# Patient Record
Sex: FEMALE | Race: WHITE | NOT HISPANIC OR LATINO | Employment: OTHER | URBAN - METROPOLITAN AREA
[De-identification: names, ages, dates, MRNs, and addresses within clinical notes are randomized per-mention and may not be internally consistent; named-entity substitution may affect disease eponyms.]

---

## 2020-01-27 ENCOUNTER — CONSULT (OUTPATIENT)
Dept: ONCOLOGY | Facility: CLINIC | Age: 80
End: 2020-01-27

## 2020-01-27 ENCOUNTER — TELEPHONE (OUTPATIENT)
Dept: SURGERY | Facility: CLINIC | Age: 80
End: 2020-01-27

## 2020-01-27 ENCOUNTER — LAB (OUTPATIENT)
Dept: LAB | Facility: HOSPITAL | Age: 80
End: 2020-01-27

## 2020-01-27 VITALS
HEART RATE: 91 BPM | SYSTOLIC BLOOD PRESSURE: 145 MMHG | RESPIRATION RATE: 18 BRPM | HEIGHT: 62 IN | TEMPERATURE: 97.8 F | OXYGEN SATURATION: 90 % | DIASTOLIC BLOOD PRESSURE: 79 MMHG | BODY MASS INDEX: 27.51 KG/M2 | WEIGHT: 149.5 LBS

## 2020-01-27 DIAGNOSIS — K62.89 RECTAL MASS: Primary | ICD-10-CM

## 2020-01-27 DIAGNOSIS — C18.9 MALIGNANT NEOPLASM OF COLON, UNSPECIFIED PART OF COLON (HCC): Primary | ICD-10-CM

## 2020-01-27 LAB
ALBUMIN SERPL-MCNC: 4.2 G/DL (ref 3.5–5.2)
ALBUMIN/GLOB SERPL: 1 G/DL (ref 1.1–2.4)
ALP SERPL-CCNC: 111 U/L (ref 38–116)
ALT SERPL W P-5'-P-CCNC: 15 U/L (ref 0–33)
ANION GAP SERPL CALCULATED.3IONS-SCNC: 12.1 MMOL/L (ref 5–15)
AST SERPL-CCNC: 20 U/L (ref 0–32)
BASOPHILS # BLD AUTO: 0.09 10*3/MM3 (ref 0–0.2)
BASOPHILS NFR BLD AUTO: 0.8 % (ref 0–1.5)
BILIRUB SERPL-MCNC: 0.2 MG/DL (ref 0.2–1.2)
BUN BLD-MCNC: 16 MG/DL (ref 6–20)
BUN/CREAT SERPL: 19 (ref 7.3–30)
CALCIUM SPEC-SCNC: 9.3 MG/DL (ref 8.5–10.2)
CEA SERPL-MCNC: 3.07 NG/ML
CHLORIDE SERPL-SCNC: 104 MMOL/L (ref 98–107)
CO2 SERPL-SCNC: 24.9 MMOL/L (ref 22–29)
CREAT BLD-MCNC: 0.84 MG/DL (ref 0.6–1.1)
DEPRECATED RDW RBC AUTO: 45.5 FL (ref 37–54)
EOSINOPHIL # BLD AUTO: 0.37 10*3/MM3 (ref 0–0.4)
EOSINOPHIL NFR BLD AUTO: 3.4 % (ref 0.3–6.2)
ERYTHROCYTE [DISTWIDTH] IN BLOOD BY AUTOMATED COUNT: 12.7 % (ref 12.3–15.4)
FERRITIN SERPL-MCNC: 42.2 NG/ML (ref 13–150)
GFR SERPL CREATININE-BSD FRML MDRD: 66 ML/MIN/1.73
GLOBULIN UR ELPH-MCNC: 4.1 GM/DL (ref 1.8–3.5)
GLUCOSE BLD-MCNC: 108 MG/DL (ref 74–124)
HCT VFR BLD AUTO: 41.3 % (ref 34–46.6)
HGB BLD-MCNC: 13.4 G/DL (ref 12–15.9)
IMM GRANULOCYTES # BLD AUTO: 0.05 10*3/MM3 (ref 0–0.05)
IMM GRANULOCYTES NFR BLD AUTO: 0.5 % (ref 0–0.5)
IRON 24H UR-MRATE: 56 MCG/DL (ref 37–145)
IRON SATN MFR SERPL: 16 % (ref 14–48)
LYMPHOCYTES # BLD AUTO: 4.03 10*3/MM3 (ref 0.7–3.1)
LYMPHOCYTES NFR BLD AUTO: 36.6 % (ref 19.6–45.3)
MCH RBC QN AUTO: 31.6 PG (ref 26.6–33)
MCHC RBC AUTO-ENTMCNC: 32.4 G/DL (ref 31.5–35.7)
MCV RBC AUTO: 97.4 FL (ref 79–97)
MONOCYTES # BLD AUTO: 0.98 10*3/MM3 (ref 0.1–0.9)
MONOCYTES NFR BLD AUTO: 8.9 % (ref 5–12)
NEUTROPHILS # BLD AUTO: 5.5 10*3/MM3 (ref 1.7–7)
NEUTROPHILS NFR BLD AUTO: 49.8 % (ref 42.7–76)
NRBC BLD AUTO-RTO: 0 /100 WBC (ref 0–0.2)
PLATELET # BLD AUTO: 294 10*3/MM3 (ref 140–450)
PMV BLD AUTO: 9.5 FL (ref 6–12)
POTASSIUM BLD-SCNC: 4.2 MMOL/L (ref 3.5–4.7)
PROT SERPL-MCNC: 8.3 G/DL (ref 6.3–8)
RBC # BLD AUTO: 4.24 10*6/MM3 (ref 3.77–5.28)
SODIUM BLD-SCNC: 141 MMOL/L (ref 134–145)
TIBC SERPL-MCNC: 350 MCG/DL (ref 249–505)
TRANSFERRIN SERPL-MCNC: 250 MG/DL (ref 200–360)
WBC NRBC COR # BLD: 11.02 10*3/MM3 (ref 3.4–10.8)

## 2020-01-27 PROCEDURE — 84466 ASSAY OF TRANSFERRIN: CPT | Performed by: INTERNAL MEDICINE

## 2020-01-27 PROCEDURE — 36416 COLLJ CAPILLARY BLOOD SPEC: CPT

## 2020-01-27 PROCEDURE — 83540 ASSAY OF IRON: CPT | Performed by: INTERNAL MEDICINE

## 2020-01-27 PROCEDURE — 80053 COMPREHEN METABOLIC PANEL: CPT | Performed by: INTERNAL MEDICINE

## 2020-01-27 PROCEDURE — 99215 OFFICE O/P EST HI 40 MIN: CPT | Performed by: INTERNAL MEDICINE

## 2020-01-27 PROCEDURE — 82378 CARCINOEMBRYONIC ANTIGEN: CPT | Performed by: INTERNAL MEDICINE

## 2020-01-27 PROCEDURE — 82728 ASSAY OF FERRITIN: CPT | Performed by: INTERNAL MEDICINE

## 2020-01-27 PROCEDURE — 36415 COLL VENOUS BLD VENIPUNCTURE: CPT | Performed by: INTERNAL MEDICINE

## 2020-01-27 PROCEDURE — 85025 COMPLETE CBC W/AUTO DIFF WBC: CPT

## 2020-01-27 RX ORDER — ELECTROLYTES/DEXTROSE
SOLUTION, ORAL ORAL
COMMUNITY

## 2020-01-27 RX ORDER — LISINOPRIL 10 MG/1
TABLET ORAL
COMMUNITY
Start: 2019-11-05 | End: 2020-08-09 | Stop reason: HOSPADM

## 2020-01-27 RX ORDER — VITAMIN E 268 MG
400 CAPSULE ORAL DAILY
COMMUNITY

## 2020-01-27 RX ORDER — PANTOPRAZOLE SODIUM 40 MG/1
TABLET, DELAYED RELEASE ORAL
COMMUNITY
Start: 2019-12-02

## 2020-01-27 RX ORDER — PRAVASTATIN SODIUM 40 MG
40 TABLET ORAL 2 TIMES DAILY
COMMUNITY
Start: 2019-12-09

## 2020-01-27 RX ORDER — ASCORBIC ACID 500 MG
500 TABLET ORAL DAILY
COMMUNITY

## 2020-01-27 NOTE — PROGRESS NOTES
Subjective Frustrated about lack of direction, indicates bright red blood per rectum since early fall    REASON FOR CONSULTATION:    Provide an opinion on any further workup or treatment                             REQUESTING PHYSICIAN: Rosa Christianson MD, Zen Franco MD    RECORDS OBTAINED:  Records of the patients history including those obtained from the referring provider were reviewed and summarized in detail.    HISTORY OF PRESENT ILLNESS:  The patient is a 79 y.o. year old female who is here for an opinion about the above issue.    History of Present Illness       The patient is a 79-year-old female followed (according to record) by primary care with depression, pulmonary hypertension, macular degeneration and retinal degeneration as well as aortic atherosclerosis.  She was seen December 10, 2019 by primary care plans to continue aspirin, lisinopril, pantoprazole and pravastatin.She had been noting bright red blood per rectum since the fall, 2019 with the presumption that she had hemorrhoidal bleeding.  She also indicates that she has been chronically constipated now requiring a laxative on a regular basis.  She has been tested previously with hemoccult but requested that this was not going to be helpful with her symptoms.  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     Review of the procedure note by Dr. Zen Franco January 3, 2020 indicates that there is a large mass at the anorectal area likely in the distal rectum, cauliflower with ulceration multiple biopsies taken. She has not been evaluated by colorectal surgery thus far.  The patient requested assessment and [facility] and is seen with her   and granddaughter.  She has not had weight loss, night sweats, fever, chills though has recognized change in bowel function as described above with bright red blood per rectum for several months.      History reviewed. No pertinent past medical history.     Past Surgical History:   Procedure Laterality Date   • HYSTERECTOMY          Current Outpatient Medications on File Prior to Visit   Medication Sig Dispense Refill   • Cranberry 250 MG tablet Take  by mouth.     • lisinopril (PRINIVIL,ZESTRIL) 10 MG tablet      • Multiple Vitamins-Minerals (MULTIVITAMIN ADULT) tablet Take  by mouth.     • Multiple Vitamins-Minerals (PRESERVISION AREDS 2 PO) Take  by mouth.     • pantoprazole (PROTONIX) 40 MG EC tablet      • pravastatin (PRAVACHOL) 40 MG tablet      • vitamin C (ASCORBIC ACID) 500 MG tablet Take 500 mg by mouth Daily.     • vitamin E 400 UNIT capsule Take 400 Units by mouth Daily.       No current facility-administered medications on file prior to visit.         ALLERGIES:    Allergies   Allergen Reactions   • Codeine Rash        Social History     Socioeconomic History   • Marital status:      Spouse name: Not on file   • Number of children: Not on file   • Years of education: Not on file   • Highest education level: Not on file   Tobacco Use   • Smoking status: Former Smoker   • Smokeless tobacco: Never Used   Substance and Sexual Activity   • Alcohol use: Never     Frequency: Never   • Drug use: Never   • Sexual activity: Defer        Family History   Problem Relation Age of Onset   • Cancer Sister         Lung Cancer   • Hypertension Brother    • Heart disease Brother    • Cancer Sister         Brain Cancer        Review of Systems   Constitutional: Positive for activity change.   HENT: Negative.    Eyes: Negative.    Respiratory: Negative.    Cardiovascular: Negative.    Gastrointestinal: Positive for anal bleeding, blood in stool and constipation.   Endocrine: Negative.    Genitourinary: Negative.   "  Musculoskeletal: Negative.    Skin: Negative.    Neurological: Negative.    Hematological: Negative.    Psychiatric/Behavioral: Negative.         Objective     Vitals:    01/27/20 1432   BP: 145/79   Pulse: 91   Resp: 18   Temp: 97.8 °F (36.6 °C)   TempSrc: Oral   SpO2: 90%   Weight: 67.8 kg (149 lb 8 oz)   Height: 156.2 cm (61.5\")  Comment: new patient height   PainSc: 0-No pain     Current Status 1/27/2020   ECOG score 1       Physical Exam   Constitutional: She is oriented to person, place, and time. She appears well-developed and well-nourished.   HENT:   Head: Normocephalic and atraumatic.   Nose: Nose normal.   Mouth/Throat: Oropharynx is clear and moist.   Eyes: Pupils are equal, round, and reactive to light. Conjunctivae and EOM are normal.   Neck: Normal range of motion. Neck supple.   Cardiovascular: Normal rate, regular rhythm, normal heart sounds and intact distal pulses.   Pulmonary/Chest: Effort normal and breath sounds normal.   Abdominal: Soft. Bowel sounds are normal.   Genitourinary:   Genitourinary Comments: Rectal exam with palpable mass noted anteriorly at approximately 3 cm, nontender fixed   Musculoskeletal: Normal range of motion.   Neurological: She is alert and oriented to person, place, and time.   Skin: Skin is warm and dry.   Psychiatric: She has a normal mood and affect. Her behavior is normal. Judgment and thought content normal.         RECENT LABS:  Hematology WBC   Date Value Ref Range Status   01/27/2020 11.02 (H) 3.40 - 10.80 10*3/mm3 Final     RBC   Date Value Ref Range Status   01/27/2020 4.24 3.77 - 5.28 10*6/mm3 Final     Hemoglobin   Date Value Ref Range Status   01/27/2020 13.4 12.0 - 15.9 g/dL Final     Hematocrit   Date Value Ref Range Status   01/27/2020 41.3 34.0 - 46.6 % Final     Platelets   Date Value Ref Range Status   01/27/2020 294 140 - 450 10*3/mm3 Final          Assessment/Plan       79-year-old female followed by primary care as described above with " development over the last 4 to 5 months of bright red blood per rectum that became associated with worsening constipation.  The patient symptoms, unfortunately, progressed until she had GI assessment that revealed a mass in the distal rectum/anal rectal region that was cauliflower in description with ulceration.  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  She has been advised that she has a rectal malignancy.                                              In discussing this with the patient and her family we discussed that a primary rectal squamous cell carcinomas is a rare disorder and one wonders whether this is not an anal carcinoma that has extended into the rectum.  They are, incidentally, treated like rectal adenocarcinomas with surgical resection but there are studies that use chemoradiotherapy.      This patient needs to be assessed by colorectal surgery and undergo additional staging endoscopically.  We have contacted Dr. Dara Stack's office and, as they review the record, have requested that the patient proceed:    *Return to laboratory today for CMP, ferritin, iron studies, CEA    *Schedule PET/CT this week which would also facilitate radiation therapy consultation/analysis for combined therapy for an apparent anal carcinoma.    *Again the patient will hopefully be seen by colorectal surgery very soon though she will be seen back here in 7 to 10 days to review her her PET/CT results.

## 2020-01-27 NOTE — TELEPHONE ENCOUNTER
Kelsi at [facility] ([phone]) called to schedule a new patient appointment for Benita Garcia  1940. She was worked in her office but Dr. Ezequiel Gray said patient should see Dr. Barry Stack first because of colon cancer but he thinks it could be anal cancer, either way he would like for you to see her first then he can see her after her visit with you. What day would you like for me to schedule her into your schedule. New patient appointment our far out a month. Patient is in Epic. Please advice so I can call Kelsi back at the office.  Thank you,  Alicia.

## 2020-01-30 ENCOUNTER — HOSPITAL ENCOUNTER (OUTPATIENT)
Dept: PET IMAGING | Facility: HOSPITAL | Age: 80
Discharge: HOME | End: 2020-01-30
Admitting: INTERNAL MEDICINE

## 2020-01-30 ENCOUNTER — HOSPITAL ENCOUNTER (OUTPATIENT)
Dept: PET IMAGING | Facility: HOSPITAL | Age: 80
Discharge: HOME | End: 2020-01-30

## 2020-01-30 DIAGNOSIS — K62.89 RECTAL MASS: Primary | ICD-10-CM

## 2020-01-30 LAB — GLUCOSE BLDC GLUCOMTR-MCNC: 96 MG/DL (ref 70–130)

## 2020-01-30 PROCEDURE — 0 FLUDEOXYGLUCOSE F18 SOLUTION: Performed by: INTERNAL MEDICINE

## 2020-01-30 PROCEDURE — 78815 PET IMAGE W/CT SKULL-THIGH: CPT

## 2020-01-30 PROCEDURE — 82962 GLUCOSE BLOOD TEST: CPT

## 2020-01-30 PROCEDURE — A9552 F18 FDG: HCPCS | Performed by: INTERNAL MEDICINE

## 2020-01-30 RX ADMIN — FLUDEOXYGLUCOSE F18 1 DOSE: 300 INJECTION INTRAVENOUS at 07:37

## 2020-01-31 ENCOUNTER — OFFICE VISIT (OUTPATIENT)
Dept: SURGERY | Facility: CLINIC | Age: 80
End: 2020-01-31

## 2020-01-31 VITALS
DIASTOLIC BLOOD PRESSURE: 74 MMHG | BODY MASS INDEX: 28.13 KG/M2 | SYSTOLIC BLOOD PRESSURE: 130 MMHG | HEART RATE: 91 BPM | TEMPERATURE: 97.9 F | HEIGHT: 61 IN | OXYGEN SATURATION: 92 % | RESPIRATION RATE: 16 BRPM | WEIGHT: 149 LBS

## 2020-01-31 DIAGNOSIS — C21.0 ANAL SQUAMOUS CELL CARCINOMA (HCC): Primary | ICD-10-CM

## 2020-01-31 PROCEDURE — 99204 OFFICE O/P NEW MOD 45 MIN: CPT | Performed by: COLON & RECTAL SURGERY

## 2020-01-31 RX ORDER — ASPIRIN 81 MG/1
81 TABLET ORAL DAILY
COMMUNITY

## 2020-01-31 NOTE — PROGRESS NOTES
Benita Garcia is a 79 y.o. female who is seen as a consult at the request of Ezequiel Gray MD for anorectal mass    HPI:    Pt had colonoscopy 1/3/2020 at [facility] with Dr. Franco: several polyps, melanosis coli, and cauliflower-like rectal mass.  Mass with path showing squamous cell carcinoma    She has had multiple colonoscopies in the past.  She has had benign polyps.    She began to have blood per rectum fall 2019.  She has pain when she has a BM.  She feels mass at the external anus    She endorses hx abnl Pap  She is s/p hysterectomy age 35 due to menorrhagia.  Pt does not remember if she had abnormal cells    She had PET scan yesterday    FamHx: lung cancer x2 sisters    Past Medical History:   Diagnosis Date   • Aortic atherosclerosis (CMS/HCC)    • Arthritis    • Blood in stool 12/09/2019    [facility]. Dr. Salvador Zaldivar.   • Boil    • Chronic constipation    • Colon polyps    • Depression    • Dyslipidemia    • Encounter for screening mammogram for breast cancer     09/2014, 10/2014, 11/2015, 11/2016, 11/2017, 11/2018, 11/2019.   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   • Senile purpura (CMS/HCC)    • Transfusion history     Hx of blood transfussion.       Past Surgical History:   Procedure Laterality Date   • COLONOSCOPY N/A 01/03/2020    [facility].   • ENDOSCOPY N/A 01/03/2020    [facility].   • HYSTERECTOMY  1993   • POLYPECTOMY  01/03/2020    [facility].       Social History:   reports that she has quit smoking. Her smoking use included cigarettes. She has a 20.00 pack-year smoking history. She has never used  smokeless tobacco. She reports that she drank alcohol. She reports that she does not use drugs.      Marriage status:     Family History   Problem Relation Age of Onset   • Cancer Sister         Lung Cancer   • Hypertension Brother    • Heart disease Brother    • Cancer Sister         Brain Cancer   • Heart disease Father          Current Outpatient Medications:   •  aspirin 81 MG EC tablet, Take 81 mg by mouth Daily., Disp: , Rfl:   •  Cranberry 250 MG tablet, Take  by mouth., Disp: , Rfl:   •  lisinopril (PRINIVIL,ZESTRIL) 10 MG tablet, , Disp: , Rfl:   •  Multiple Vitamins-Minerals (MULTIVITAMIN ADULT) tablet, Take  by mouth., Disp: , Rfl:   •  Multiple Vitamins-Minerals (PRESERVISION AREDS 2 PO), Take  by mouth., Disp: , Rfl:   •  pantoprazole (PROTONIX) 40 MG EC tablet, , Disp: , Rfl:   •  pravastatin (PRAVACHOL) 40 MG tablet, , Disp: , Rfl:   •  vitamin C (ASCORBIC ACID) 500 MG tablet, Take 500 mg by mouth Daily., Disp: , Rfl:   •  vitamin E 400 UNIT capsule, Take 400 Units by mouth Daily., Disp: , Rfl:     Allergy  Codeine and Lipitor [atorvastatin]    Review of Systems   Constitution: Negative for decreased appetite and weight gain.   HENT: Negative for congestion, hearing loss and hoarse voice.    Eyes: Positive for visual disturbance. Negative for blurred vision and discharge.   Cardiovascular: Negative for chest pain, cyanosis and leg swelling.   Respiratory: Negative for cough, shortness of breath, sleep disturbances due to breathing and snoring.    Endocrine: Negative for cold intolerance and heat intolerance.   Hematologic/Lymphatic: Does not bruise/bleed easily.   Skin: Negative for itching, poor wound healing and skin cancer.   Musculoskeletal: Negative for arthritis, back pain, joint pain and joint swelling.   Gastrointestinal: Positive for constipation. Negative for abdominal pain, change in bowel habit and bowel incontinence.   Genitourinary: Negative for bladder incontinence, dysuria and  hematuria.   Neurological: Negative for brief paralysis, excessive daytime sleepiness, dizziness, focal weakness, headaches, light-headedness and weakness.   Psychiatric/Behavioral: Negative for altered mental status and hallucinations. The patient does not have insomnia.    Allergic/Immunologic: Negative for HIV exposure and persistent infections.   All other systems reviewed and are negative.      Vitals:    01/31/20 0812   BP: 130/74   Pulse: 91   Resp: 16   Temp: 97.9 °F (36.6 °C)   SpO2: 92%     Body mass index is 28.15 kg/m².    Physical Exam   Constitutional: She is oriented to person, place, and time. She appears well-developed and well-nourished. No distress.   HENT:   Head: Normocephalic and atraumatic.   Nose: Nose normal.   Mouth/Throat: Oropharynx is clear and moist.   Eyes: Pupils are equal, round, and reactive to light. Conjunctivae and EOM are normal.   Neck: Normal range of motion. No tracheal deviation present.   Pulmonary/Chest: Effort normal and breath sounds normal. No respiratory distress.   Abdominal: Soft. Bowel sounds are normal. She exhibits no distension.   Genitourinary:   Genitourinary Comments: Perianal exam: external: right posterior anal mass  TEGAN- decreased tone, +firm, irregular mass right posterior anal canal for 4 cm   Musculoskeletal: Normal range of motion. She exhibits no edema or deformity.   Neurological: She is alert and oriented to person, place, and time. No cranial nerve deficit. Coordination and gait normal.   Skin: Skin is warm and dry.   Psychiatric: She has a normal mood and affect. Her behavior is normal. Judgment normal.       Review of Medical Record:  I reviewed records colonoscopy images, report, and pathology: several polyps, melanosis coli, and cauliflower-like rectal mass.  Mass with pathology showing squamous cell carcinoma    I personally reviewed images PET scan yesterday: no evidence distant mets    Assessment:  1. Anal squamous cell carcinoma (CMS/HCC)         Plan:    Discussion with pt and her  regarding anal squamous cell carcinoma.  Will refer for chemoradiation.  Will also refer to GYN for evaluation, as anal cancer is associated with an increased risk of HPV related cancer.    Regarding PET, will await formal radiology read.      RTC 3 months      Scribed for Barry Stack MD by Mayte Downey PA-C  1/31/2020   This patient was evaluated by me, recommendations made, documentation reviewed, edited, and revised by me, Barry Stack MD

## 2020-02-04 ENCOUNTER — LAB (OUTPATIENT)
Dept: OTHER | Facility: HOSPITAL | Age: 80
End: 2020-02-04

## 2020-02-04 ENCOUNTER — OFFICE VISIT (OUTPATIENT)
Dept: ONCOLOGY | Facility: CLINIC | Age: 80
End: 2020-02-04

## 2020-02-04 ENCOUNTER — DOCUMENTATION (OUTPATIENT)
Dept: ONCOLOGY | Facility: CLINIC | Age: 80
End: 2020-02-04

## 2020-02-04 VITALS
HEART RATE: 75 BPM | HEIGHT: 61 IN | BODY MASS INDEX: 28.15 KG/M2 | OXYGEN SATURATION: 90 % | WEIGHT: 149.1 LBS | TEMPERATURE: 97.3 F | SYSTOLIC BLOOD PRESSURE: 128 MMHG | RESPIRATION RATE: 18 BRPM | DIASTOLIC BLOOD PRESSURE: 77 MMHG

## 2020-02-04 DIAGNOSIS — K62.89 RECTAL MASS: ICD-10-CM

## 2020-02-04 DIAGNOSIS — C21.0 ANAL CANCER (HCC): Primary | ICD-10-CM

## 2020-02-04 LAB
BASOPHILS # BLD AUTO: 0.12 10*3/MM3 (ref 0–0.2)
BASOPHILS NFR BLD AUTO: 0.9 % (ref 0–1.5)
DEPRECATED RDW RBC AUTO: 44 FL (ref 37–54)
EOSINOPHIL # BLD AUTO: 0.33 10*3/MM3 (ref 0–0.4)
EOSINOPHIL NFR BLD AUTO: 2.5 % (ref 0.3–6.2)
ERYTHROCYTE [DISTWIDTH] IN BLOOD BY AUTOMATED COUNT: 12.7 % (ref 12.3–15.4)
HCT VFR BLD AUTO: 41.2 % (ref 34–46.6)
HGB BLD-MCNC: 13.6 G/DL (ref 12–15.9)
IMM GRANULOCYTES # BLD AUTO: 0.14 10*3/MM3 (ref 0–0.05)
IMM GRANULOCYTES NFR BLD AUTO: 1.1 % (ref 0–0.5)
LYMPHOCYTES # BLD AUTO: 3.99 10*3/MM3 (ref 0.7–3.1)
LYMPHOCYTES NFR BLD AUTO: 30.5 % (ref 19.6–45.3)
MCH RBC QN AUTO: 31.2 PG (ref 26.6–33)
MCHC RBC AUTO-ENTMCNC: 33 G/DL (ref 31.5–35.7)
MCV RBC AUTO: 94.5 FL (ref 79–97)
MONOCYTES # BLD AUTO: 1.13 10*3/MM3 (ref 0.1–0.9)
MONOCYTES NFR BLD AUTO: 8.6 % (ref 5–12)
NEUTROPHILS # BLD AUTO: 7.37 10*3/MM3 (ref 1.7–7)
NEUTROPHILS NFR BLD AUTO: 56.4 % (ref 42.7–76)
NRBC BLD AUTO-RTO: 0 /100 WBC (ref 0–0.2)
PLATELET # BLD AUTO: 390 10*3/MM3 (ref 140–450)
PMV BLD AUTO: 9 FL (ref 6–12)
RBC # BLD AUTO: 4.36 10*6/MM3 (ref 3.77–5.28)
WBC NRBC COR # BLD: 13.02 10*3/MM3 (ref 3.4–10.8)

## 2020-02-04 PROCEDURE — 36415 COLL VENOUS BLD VENIPUNCTURE: CPT

## 2020-02-04 PROCEDURE — 99215 OFFICE O/P EST HI 40 MIN: CPT | Performed by: INTERNAL MEDICINE

## 2020-02-04 PROCEDURE — 85025 COMPLETE CBC W/AUTO DIFF WBC: CPT | Performed by: INTERNAL MEDICINE

## 2020-02-04 NOTE — PROGRESS NOTES
Call rec from Dr Gray-he would like to start pt on Xeloda with Mitomycin.    Xeloda will be 1500 mg BID with radiation (M-F) for 6 weeks.    Mitomycin will be 15 mg one time.    I have notified precert team.     I have submitted the B vs D determination to [facility] Medicare.    Waiting for a decision.

## 2020-02-05 ENCOUNTER — MEDICATION THERAPY MANAGEMENT (OUTPATIENT)
Dept: PHARMACY | Facility: HOSPITAL | Age: 80
End: 2020-02-05

## 2020-02-05 ENCOUNTER — APPOINTMENT (OUTPATIENT)
Dept: LAB | Facility: HOSPITAL | Age: 80
End: 2020-02-05

## 2020-02-05 ENCOUNTER — SPECIALTY PHARMACY (OUTPATIENT)
Dept: ONCOLOGY | Facility: CLINIC | Age: 80
End: 2020-02-05

## 2020-02-05 ENCOUNTER — DOCUMENTATION (OUTPATIENT)
Dept: ONCOLOGY | Facility: CLINIC | Age: 80
End: 2020-02-05

## 2020-02-05 VITALS — BODY MASS INDEX: 28 KG/M2 | WEIGHT: 148.1 LBS

## 2020-02-05 DIAGNOSIS — I87.8 POOR VENOUS ACCESS: Primary | ICD-10-CM

## 2020-02-05 RX ORDER — CAPECITABINE 500 MG/1
TABLET, FILM COATED ORAL
Qty: 120 TABLET | Refills: 0 | Status: SHIPPED | OUTPATIENT
Start: 2020-02-05 | End: 2020-03-02

## 2020-02-05 NOTE — PROGRESS NOTES
Oral Chemotherapy Teaching      Patient Name/:  Benita Garcia   1940  Oral Chemotherapy Regimen:  Xeloda 1500 mg po bid Monday-Friday with radiation, Mitomycin 10  Mg/m2 IV day 1   Date Started Medication: when radiation begins    Initial Teaching Follow Up Comments     Safety     Storage instructions (away from children; away from heat/cold, sunlight, or moisture), handling - use of gloves (caregivers), washing hands after touching pills, managing waste     “How are you storing your medications?”, reminders on storage, proper handling (caregivers using gloves, washing hands, away from children, managing waste, etc.), disposal of medication with D/C or dosage change    Storage of Xeloda at room temp in a dry location secured from children and pets.     Adherence      patient and/or caregiver on how to take medication, take with/without food, assess their adherence potential, stress importance of adherence, ways to manage adherence (pill boxes, phone reminders, calendars), what to do if miss a dose   “How are you taking your medication?” “How are you remembering to take your medication?”, “How many doses have you missed?”, determine reasons for non-adherence (not remembering, side effects, etc), ways to improve, overadherence? Remind patient of ways to improve/maintain adherence   Xeloda 1500 mg twice daily 30 minutes after a meal at approximately the same time daily.  An example calendar was given to the patient with emphasis that her own schedule had not been set yet.      Side Effects/Adverse Reactions      patient on potential side effects, s/s, ways to manage, when to call MD/seek help     Determine if patient experiencing side effects, ways to manage  The following potential side effects and management were discussed:  Low WBCs along with handwashing, avoidance of flu, and calling for temp >100.4, Low hemoglobin and symptoms of anemia and fatigue (rest periods encouraged), diarrhea  along with imodium management, mouth sores along with baking soda and salt rinses pc and hs as management, see below     Miscellaneous     Food interactions, DDIs, financial issues Determine if patient started any new medications since being placed on oral chemo (analyze for DDI) DDI report reviewed.  Pt was instructed to take her PPI at bedtime spaced away from Xeloda     Additional Notes:  Hand foot syndrome along with importance of moisturizing with Eucerin or Aquaphor, elevated LFTS, nausea/vomiting along with zofran management, hair loss, low platelets along with bleeding precautions of using soft toothbrush, gentle flossing, reporting nosebleeds or excessive bruising, and fluid rentention.  Overlapping side effects with Mitomycin were reviewed.  Pt was informed of Mitomycin blue color and to expect a slow IV push of Mitomycin on day 1 of treatment.  The concept of julieth and recovery was reviewed.  We also discussed importance of setting up schedule for PICC line placement and to keep her radiology appointment for scheduling of xrt.  She was instructed to notify the office if xeloda had not been delivered prior to first xrt day.    CCA and consents signed.

## 2020-02-05 NOTE — PROGRESS NOTES
Robin has denied the Capecitabine through pts Part D plan but has approved it through her Part B plan.     I have escribed the Rx to [facility] for processing.

## 2020-02-06 NOTE — PROGRESS NOTES
Subjective Discussed overall plan, use of PICC line    REASON FOR CONSULTATION: Anal carcinoma     REQUESTING PHYSICIAN: Rosa Christianson MD, Zen Franco MD    History of Present Illness       The patient is a 79-year-old female followed (according to record) by primary care with depression, pulmonary hypertension, macular degeneration and retinal degeneration as well as aortic atherosclerosis.  She was seen December 10, 2019 by primary care plans to continue aspirin, lisinopril, pantoprazole and pravastatin.She had been noting bright red blood per rectum since the fall, 2019 with the presumption that she had hemorrhoidal bleeding.  She also indicates that she has been chronically constipated now requiring a laxative on a regular basis.  She has been tested previously with hemoccult but requested that this was not going to be helpful with her symptoms.  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     Review of the procedure note by Dr. Zen Franco January 3, 2020 indicates that there is a large mass at the anorectal area likely in the distal rectum, cauliflower with ulceration multiple biopsies taken. She has not been evaluated by colorectal surgery thus far.  The patient requested assessment and [facility] and is seen with her  and granddaughter.  She has not had weight loss, night sweats, fever, chills though has recognized change in bowel function as described above with bright red blood per rectum for several months.     The patient was assessed January 27 and felt to have an apparent anal carcinoma that extended into the rectum.  As result she was asked to undergo PET/CT scanning and assessment by  colorectal surgery.  PET/CT demonstrated the primary tumor as an oblong tumor involving the 4 inferior SPECT SPECT the rectum measuring 5 cm x 3.6 cm with intense hypermetabolism at 33.1.  There is no evidence of disease within the abdomen pelvis with mild hypermetabolism with borderline enlarged bilateral hilar and subcarinal lymph nodes with SUV up to 6.0.  These are suspected to be inflammatory in nature.  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  The patient is now scheduled to be seen by radiation therapy on February 7, 2001.     The patient is seen back February 4, 2020 and we have again discussed concurrent chemoradiotherapy rather than surgery using concurrent 5-FU (in this case capecitabine)and mitomycin.  This is a combination of 10 mg/m²-maximum 15 mg IV of mitomycin given through a free-flowing line, capecitabine is 825 mg meter squared on radiation days through the completion of radiation therapy.  This is been discussed in detail with she and her  of approximately 45 minutes therapy for 2020.  We discussed assessing her for Xeloda toxicity, have her undergo teaching for the combination treatment, subsequently scheduling a PICC line, and to proceed to radiation therapy consultation as scheduled on February 7.  We are hopeful that we can start within the next week.   The patient went on to undergo teaching, had a PICC line placed February 11, 2020 and has been seen by radiation therapy with treatment to begin February 17, 2020.  She is seen back with her  and we discussed the overall plan in detail.  Past Medical History:   Diagnosis Date   • Anal cancer (CMS/HCC) 01/2020    SQUAMOUS CELL CARCINOMA, FOLLOWED BY DR. JOVITA BAUTISTA   • Aortic atherosclerosis (CMS/HCC)    • Arthritis    • Boil    • Chronic constipation    •  Colon polyps    • Depression    • Encounter for screening mammogram for breast cancer     09/2014, 10/2014, 11/2015, 11/2016, 11/2017, 11/2018, 11/2019.   • Exudative senile macular retinal degeneration (CMS/HCC)    • GERD (gastroesophageal reflux disease)     FOLLOWED BY DR. 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        Past Surgical History:   Procedure Laterality Date   • COLONOSCOPY W/ POLYPECTOMY N/A 01/03/2020    LARGE MASS IN ANORECTAL AREA, BX: SQUAMOUS CELL CARCINOMA, 3 TUBULAR ADENOMA POLYPS IN ASCENDING, 3 TUBULAR ADENOMA POLYPS IN TRANSVERSE, MELANOSIS COLI, DR. IMTIAZ SELLERS AT [facility]    • ENDOSCOPY N/A 01/03/2020    DR. IMTIAZ SELLERS AT [facility]   • HYSTERECTOMY N/A 1993        Current Outpatient Medications on File Prior to Visit   Medication Sig Dispense Refill   • aspirin 81 MG EC tablet Take 81 mg by mouth Daily.     • capecitabine (XELODA) 500 MG chemo tablet Take 3 tabs (1500 mg) by mouth twice per day Mon-Fri with radiation. 120 tablet 0   • Cranberry 250 MG tablet Take  by mouth.     • lisinopril (PRINIVIL,ZESTRIL) 10 MG tablet      • Multiple Vitamins-Minerals (MULTIVITAMIN ADULT) tablet Take  by mouth.     • Multiple Vitamins-Minerals (PRESERVISION AREDS 2 PO) Take  by mouth.     • pantoprazole (PROTONIX) 40 MG EC tablet      • pravastatin (PRAVACHOL) 40 MG tablet      • vitamin C (ASCORBIC ACID) 500 MG tablet Take 500 mg by mouth Daily.     • vitamin E 400 UNIT capsule Take 400 Units by mouth Daily.       No current facility-administered medications on file prior to visit.         ALLERGIES:    Allergies   Allergen Reactions   • Codeine Rash   • Lipitor [Atorvastatin] Rash        Social History  "    Socioeconomic History   • Marital status:      Spouse name: Dimitri   • Number of children: 2   • Years of education: High school   • Highest education level: Not on file   Occupational History     Employer: RETIRED   Tobacco Use   • Smoking status: Former Smoker     Packs/day: 0.50     Years: 40.00     Pack years: 20.00     Types: Cigarettes   • Smokeless tobacco: Never Used   Substance and Sexual Activity   • Alcohol use: Not Currently     Frequency: Never   • Drug use: Never   • Sexual activity: Yes     Partners: Male     Birth control/protection: Post-menopausal, Surgical     Comment: .        Family History   Problem Relation Age of Onset   • Cancer Sister         Lung Cancer   • Lung cancer Sister    • Hypertension Brother    • Heart disease Brother    • Cancer Sister         Brain Cancer   • Brain cancer Sister    • Heart disease Father    • Cancer Sister    • Lung cancer Sister    • Cancer Sister    • Lung cancer Sister         Review of Systems   Constitutional: Positive for activity change.   HENT: Negative.    Eyes: Negative.    Respiratory: Negative.    Cardiovascular: Negative.    Gastrointestinal: Positive for anal bleeding, blood in stool and constipation.   Endocrine: Negative.    Genitourinary: Negative.    Musculoskeletal: Negative.    Skin: Negative.    Neurological: Negative.    Hematological: Negative.    Psychiatric/Behavioral: Negative.         Objective     Vitals:    02/12/20 1233   BP: 123/73   Pulse: 95   Resp: 18   Temp: 97.9 °F (36.6 °C)   TempSrc: Oral   SpO2: 92%   Weight: 67.6 kg (149 lb)   Height: 154.9 cm (60.98\")   PainSc: 0-No pain     Current Status 2/12/2020   ECOG score 0       Physical Exam   Constitutional: She is oriented to person, place, and time. She appears well-developed and well-nourished.   HENT:   Head: Normocephalic and atraumatic.   Nose: Nose normal.   Mouth/Throat: Oropharynx is clear and moist.   Eyes: Pupils are equal, round, and reactive to " light. Conjunctivae and EOM are normal.   Neck: Normal range of motion. Neck supple.   Cardiovascular: Normal rate, regular rhythm, normal heart sounds and intact distal pulses.   Pulmonary/Chest: Effort normal and breath sounds normal.   Abdominal: Soft. Bowel sounds are normal.   Genitourinary:   Genitourinary Comments: Rectal exam with palpable mass noted anteriorly at approximately 3 cm, nontender fixed   Musculoskeletal: Normal range of motion.   PICC line placed right upper extremity without additional inflammation or discharge at placement site   Neurological: She is alert and oriented to person, place, and time.   Skin: Skin is warm and dry.   Psychiatric: She has a normal mood and affect. Her behavior is normal. Judgment and thought content normal.         RECENT LABS:  Hematology WBC   Date Value Ref Range Status   02/12/2020 11.26 (H) 3.40 - 10.80 10*3/mm3 Final     RBC   Date Value Ref Range Status   02/12/2020 3.98 3.77 - 5.28 10*6/mm3 Final     Hemoglobin   Date Value Ref Range Status   02/12/2020 12.6 12.0 - 15.9 g/dL Final     Hematocrit   Date Value Ref Range Status   02/12/2020 39.1 34.0 - 46.6 % Final     Platelets   Date Value Ref Range Status   02/12/2020 361 140 - 450 10*3/mm3 Final        FDG PET/CT IMAGING SKULL BASE TO MID THIGH  1/30/2020    FINDINGS: The primary neoplasm is an oblong tumor mass involving the far  inferior aspect of the rectum and the pain is that measures  approximately 5 cm in greatest cephalocaudad dimension and 3.6 cm in  greatest mediolateral dimension. It demonstrates intense hypermetabolism  with a maximum measured as she 33.1 g/mL. There is no metabolic evidence  of metastatic disease within the abdomen or pelvis. Particular, no  hypermetabolic lymphadenopathy is identified. There is physiologic  distribution of the radiopharmaceutical within the neck. Imaging of the  chest shows mild hypermetabolism within a borderline enlarged bilateral  hilar and subcarinal lymph  nodes. These have maximum SUV in the range of  6.0 g/mL. This would be a very usual location for isolated metastatic  disease from an anorectal neoplasm. These lymph nodes are favored to be  inflammatory in etiology.     IMPRESSION:  Intensely hypermetabolic neoplasm involving the far inferior  aspect of the rectum and essentially the entire anus. There is no  compelling metabolic evidence of metastatic disease within the neck,  chest, abdomen or pelvis.     This report was finalized on 1/31/2020 2:39 PM by Dr. Mayco Patel M.D.    Assessment/Plan       79-year-old female followed by primary care as described above with development over the last 4 to 5 months of bright red blood per rectum that became associated with worsening constipation.  The patient symptoms, unfortunately, progressed until she had GI assessment that revealed a mass in the distal rectum/anal rectal region that was cauliflower in description with ulceration.  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  She has been advised that she has a rectal malignancy.                                              In discussing this with the patient and her family we discussed that a primary rectal squamous cell carcinomas is a rare disorder and one wonders whether this is not an anal carcinoma that has extended into the rectum.  They are, incidentally, treated like rectal adenocarcinomas with surgical resection but there are studies that use chemoradiotherapy.      This patient needs to be assessed by colorectal surgery and undergo additional staging endoscopically.  We have contacted Dr. Dara Stack's office and, as they review the record, have requested that the patient proceed with laboratory studies, PET/CT and colorectal surgery assessment.  Additional laboratory studies include a CEA of 3.07, no evidence of iron deficiency, CMP with total protein mildly elevated at 8.3 g/dL.  This did occur and the the patient's  studies suggest T2 N0 M0-stage IIA disease.      After review by colorectal surgery the patient is felt best treated by chemoradiation therapy and she is seen with her  February 4, 2020 at which time we discussed how this can be accomplished.  The patient proceeded to laboratory for DPD testing which is currently pending as she is seen February 12, 2020.  We have obtained Xeloda at a 25 mg meter squared to 1500 mg p.o. twice daily during days of radiation therapy with mitomycin also planned - 10 mg meter squared to 15 mg dosing anticipated day 1.  She has been seen by radiation therapy and plans are to initiate treatment February 17, 2020.    Plan:  *Patient to present February 17 for IV mitomycin x1, 15 mg  *Radiation therapy also scheduled to begin February 17  *Twice daily dosing Xeloda 1500 mg p.o. twice daily start February 17, 2020  *Return 1 week later for NP assessment, dressing of PICC line, review of side effect profile and MD follow-up approximately 2 weeks later

## 2020-02-07 ENCOUNTER — CONSULT (OUTPATIENT)
Dept: RADIATION ONCOLOGY | Facility: HOSPITAL | Age: 80
End: 2020-02-07

## 2020-02-07 ENCOUNTER — APPOINTMENT (OUTPATIENT)
Dept: RADIATION ONCOLOGY | Facility: HOSPITAL | Age: 80
End: 2020-02-07

## 2020-02-07 VITALS
BODY MASS INDEX: 27.79 KG/M2 | SYSTOLIC BLOOD PRESSURE: 133 MMHG | HEART RATE: 84 BPM | WEIGHT: 147 LBS | OXYGEN SATURATION: 91 % | DIASTOLIC BLOOD PRESSURE: 79 MMHG

## 2020-02-07 DIAGNOSIS — C21.0 ANAL CANCER (HCC): Primary | ICD-10-CM

## 2020-02-07 PROCEDURE — 77263 THER RADIOLOGY TX PLNG CPLX: CPT | Performed by: RADIOLOGY

## 2020-02-07 PROCEDURE — G0463 HOSPITAL OUTPT CLINIC VISIT: HCPCS | Performed by: RADIOLOGY

## 2020-02-07 PROCEDURE — 99205 OFFICE O/P NEW HI 60 MIN: CPT | Performed by: RADIOLOGY

## 2020-02-07 PROCEDURE — 77334 RADIATION TREATMENT AID(S): CPT | Performed by: RADIOLOGY

## 2020-02-07 NOTE — H&P (VIEW-ONLY)
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 Stack:    I had the pleasure of seeing Benita Garcia  [facility].    The patient is a 79 y.o. female with newly diagnosed anal cancer.  She presented in late December 2019 with rectal bleeding and progressive constipation for several months. She underwent a colonscopy with Dr. Zen Franco on 1/3/20 which showed a large mass at the anorectal area and distal rectum, with some ulceration.  Biopsies of the mass revealed moderately differentiated squamous cell carcinoma.      She had a PET scan on 1/30/20 which revealed a 5 x 3.3cm mass in the distal rectum with intense hypermetabolic activity with max suv of 33 with no hypermetabolic pelvic or inguinal adenopathy.  She did have mildly enlarged hilar and subcarinal nodes with mild uptake max suv of 6 favored to be inflammatory and no evidence of distant metastatic disease. She met with Dr. Barry Stack on 1/31/20 and on her physical exam was noted to have a firm 4cm mass right posterior anal canal with decreased rectal tone    She denies any recent weight loss, fever or chills.  She still has rectal bleeding and constipation but no signficant pain. She met with Dr. Gray and he has recommended concurrent chemo and radiation consisting of xeloda 825mg/m2 daily with radiation and mitomycin C.  She is referred today for evaluation for radiation.   .        Review of Systems   Constitutional: Positive for activity change, appetite change and fatigue. Negative for fever and unexpected weight change.   HENT: Negative.    Eyes: Positive for visual disturbance.   Respiratory: Negative.    Cardiovascular: Negative.    Gastrointestinal: Positive for blood in stool and constipation.   Genitourinary: Negative.     Musculoskeletal: Negative.    Skin: Negative.    Neurological: Negative.    Psychiatric/Behavioral: Negative.          Past Medical History:   Diagnosis Date   • Anal cancer (CMS/HCC) 01/2020    SQUAMOUS CELL CARCINOMA, FOLLOWED BY DR. JOVITA BAUTISTA   • Aortic atherosclerosis (CMS/HCC)    • Arthritis    • Boil    • Chronic constipation    • Colon polyps    • Depression    • Encounter for screening mammogram for breast cancer     09/2014, 10/2014, 11/2015, 11/2016, 11/2017, 11/2018, 11/2019.   • Exudative senile macular retinal degeneration (CMS/HCC)    • GERD (gastroesophageal reflux disease)     FOLLOWED BY DR. 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         Past Surgical History:   Procedure Laterality Date   • COLONOSCOPY W/ POLYPECTOMY N/A 01/03/2020    LARGE MASS IN ANORECTAL AREA, BX: SQUAMOUS CELL CARCINOMA, 3 TUBULAR ADENOMA POLYPS IN ASCENDING, 3 TUBULAR ADENOMA POLYPS IN TRANSVERSE, MELANOSIS COLI, DR. IMTIAZ SELLERS AT [facility]    • ENDOSCOPY N/A 01/03/2020    DR. IMTIAZ SELLERS AT [facility]   • HYSTERECTOMY N/A 1993         Social History     Socioeconomic History   • Marital status:      Spouse name: Dimitri   • Number of children: 2   • Years of education: High school   • Highest education level: Not on file   Occupational History     Employer: RETIRED   Tobacco Use   • Smoking status: Former Smoker     Packs/day: 0.50     Years: 40.00     Pack years: 20.00     Types: Cigarettes   • Smokeless tobacco: Never Used   Substance and Sexual Activity   • Alcohol use: Not Currently     Frequency: Never   • Drug use: Never   • Sexual activity: Yes     Partners: Male     Birth control/protection: Post-menopausal,  Surgical     Comment: .         Family History   Problem Relation Age of Onset   • Cancer Sister         Lung Cancer   • Lung cancer Sister    • Hypertension Brother    • Heart disease Brother    • Cancer Sister         Brain Cancer   • Brain cancer Sister    • Heart disease Father    • Cancer Sister    • Lung cancer Sister    • Cancer Sister    • Lung cancer Sister           Objective    Physical Exam   Constitutional: She is oriented to person, place, and time. She appears well-developed and well-nourished.   HENT:   Head: Atraumatic.   Eyes: EOM are normal.   Neck: Neck supple.   Pulmonary/Chest: Effort normal.   Abdominal: Soft. She exhibits no mass. There is no tenderness.   Genitourinary:   Genitourinary Comments: Mass extending from anus to perianal skin, firm palpable mass extending from anal verge superiorly 3-4cm posterior and right anal wall.  Mild bleeding, no palpable inguinal nodes   Musculoskeletal: Normal range of motion.   Neurological: She is alert and oriented to person, place, and time.   Skin: Skin is warm.   Psychiatric: She has a normal mood and affect. Her behavior is normal. Judgment and thought content normal.         Current Outpatient Medications on File Prior to Visit   Medication Sig Dispense Refill   • aspirin 81 MG EC tablet Take 81 mg by mouth Daily.     • Cranberry 250 MG tablet Take  by mouth.     • lisinopril (PRINIVIL,ZESTRIL) 10 MG tablet      • Multiple Vitamins-Minerals (MULTIVITAMIN ADULT) tablet Take  by mouth.     • pantoprazole (PROTONIX) 40 MG EC tablet      • pravastatin (PRAVACHOL) 40 MG tablet      • vitamin C (ASCORBIC ACID) 500 MG tablet Take 500 mg by mouth Daily.     • vitamin E 400 UNIT capsule Take 400 Units by mouth Daily.     • capecitabine (XELODA) 500 MG chemo tablet Take 3 tabs (1500 mg) by mouth twice per day Mon-Fri with radiation. 120 tablet 0   • Multiple Vitamins-Minerals (PRESERVISION AREDS 2 PO) Take  by mouth.       No current  facility-administered medications on file prior to visit.        ALLERGIES:    Allergies   Allergen Reactions   • Codeine Rash   • Lipitor [Atorvastatin] Rash       There were no vitals taken for this visit.     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  I have personally reviewed her imaging including PET scan and records from referring physicians.  I discussed with Mrs. Garcia and her  our recommendation for concurrent chemo and radiation consisting of xeloda and mitomycin C.  I  discussed in detail the risks, benefits and rationale of radiation therapy for anal cancer to include but not limited to the following:    Acute:  skin erythema, breakdown, fatigue, lowered blood counts, risk of infection, nausea, vomiting, abdominal cramping, diarrhea, bowel or bladder incontinence, urinary urgency, hesitancy, frequency or dysuria, pain with bowel movements    Late:  persistant fatigue, lowered blood counts, bowel ulceration, small bowel obstruction, anal stricture, persistant bowel incontinence or frequency, hip fracture, shrinkage of the bladder resulting in increased urinary frequency, vaginal fibrosis and foreshortening and the remote risk of second malignancies    Ms. Garcia voiced understanding and an informed consent was signed and placed on the chart today.  We will proceed with CT simualtion for treatment planning purposes today and begin her treatments concurrently with the chemo on Monday February 17.  I plan to deliver a dose of 50-54Gy to the primary tumor. I have also scheduled her to be evaluated by a gynecologist, Dr. Cassie Nick, with [facility] and the first available appointment was February 19.      I spent greater than 60 minutes in face-to-face time with the patient and 45 minutes of that time were spent in counseling and coordination of care, including review of imaging and pathology;  indications, goals, logistics, alternatives and risks - both common and rare - for my recommendations as well as surveillance and potential outcomes.           Thank you very much for allowing me to participate in the care of this very pleasant patient.    Sincerely,      Mariana Beyer MD

## 2020-02-07 NOTE — PROGRESS NOTES
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 Stack:    I had the pleasure of seeing Benita Garcia  [facility].    The patient is a 79 y.o. female with newly diagnosed anal cancer.  She presented in late December 2019 with rectal bleeding and progressive constipation for several months. She underwent a colonscopy with Dr. Zen Franco on 1/3/20 which showed a large mass at the anorectal area and distal rectum, with some ulceration.  Biopsies of the mass revealed moderately differentiated squamous cell carcinoma.      She had a PET scan on 1/30/20 which revealed a 5 x 3.3cm mass in the distal rectum with intense hypermetabolic activity with max suv of 33 with no hypermetabolic pelvic or inguinal adenopathy.  She did have mildly enlarged hilar and subcarinal nodes with mild uptake max suv of 6 favored to be inflammatory and no evidence of distant metastatic disease. She met with Dr. Barry Stack on 1/31/20 and on her physical exam was noted to have a firm 4cm mass right posterior anal canal with decreased rectal tone    She denies any recent weight loss, fever or chills.  She still has rectal bleeding and constipation but no signficant pain. She met with Dr. Gray and he has recommended concurrent chemo and radiation consisting of xeloda 825mg/m2 daily with radiation and mitomycin C.  She is referred today for evaluation for radiation.   .        Review of Systems   Constitutional: Positive for activity change, appetite change and fatigue. Negative for fever and unexpected weight change.   HENT: Negative.    Eyes: Positive for visual disturbance.   Respiratory: Negative.    Cardiovascular: Negative.    Gastrointestinal: Positive for blood in stool and constipation.   Genitourinary: Negative.     Musculoskeletal: Negative.    Skin: Negative.    Neurological: Negative.    Psychiatric/Behavioral: Negative.          Past Medical History:   Diagnosis Date   • Anal cancer (CMS/HCC) 01/2020    SQUAMOUS CELL CARCINOMA, FOLLOWED BY DR. JOVITA BAUTISTA   • Aortic atherosclerosis (CMS/HCC)    • Arthritis    • Boil    • Chronic constipation    • Colon polyps    • Depression    • Encounter for screening mammogram for breast cancer     09/2014, 10/2014, 11/2015, 11/2016, 11/2017, 11/2018, 11/2019.   • Exudative senile macular retinal degeneration (CMS/HCC)    • GERD (gastroesophageal reflux disease)     FOLLOWED BY DR. 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         Past Surgical History:   Procedure Laterality Date   • COLONOSCOPY W/ POLYPECTOMY N/A 01/03/2020    LARGE MASS IN ANORECTAL AREA, BX: SQUAMOUS CELL CARCINOMA, 3 TUBULAR ADENOMA POLYPS IN ASCENDING, 3 TUBULAR ADENOMA POLYPS IN TRANSVERSE, MELANOSIS COLI, DR. IMTIAZ SELLERS AT [facility]    • ENDOSCOPY N/A 01/03/2020    DR. IMTIAZ SELLERS AT [facility]   • HYSTERECTOMY N/A 1993         Social History     Socioeconomic History   • Marital status:      Spouse name: Dimitri   • Number of children: 2   • Years of education: High school   • Highest education level: Not on file   Occupational History     Employer: RETIRED   Tobacco Use   • Smoking status: Former Smoker     Packs/day: 0.50     Years: 40.00     Pack years: 20.00     Types: Cigarettes   • Smokeless tobacco: Never Used   Substance and Sexual Activity   • Alcohol use: Not Currently     Frequency: Never   • Drug use: Never   • Sexual activity: Yes     Partners: Male     Birth control/protection: Post-menopausal,  Surgical     Comment: .         Family History   Problem Relation Age of Onset   • Cancer Sister         Lung Cancer   • Lung cancer Sister    • Hypertension Brother    • Heart disease Brother    • Cancer Sister         Brain Cancer   • Brain cancer Sister    • Heart disease Father    • Cancer Sister    • Lung cancer Sister    • Cancer Sister    • Lung cancer Sister           Objective    Physical Exam   Constitutional: She is oriented to person, place, and time. She appears well-developed and well-nourished.   HENT:   Head: Atraumatic.   Eyes: EOM are normal.   Neck: Neck supple.   Pulmonary/Chest: Effort normal.   Abdominal: Soft. She exhibits no mass. There is no tenderness.   Genitourinary:   Genitourinary Comments: Mass extending from anus to perianal skin, firm palpable mass extending from anal verge superiorly 3-4cm posterior and right anal wall.  Mild bleeding, no palpable inguinal nodes   Musculoskeletal: Normal range of motion.   Neurological: She is alert and oriented to person, place, and time.   Skin: Skin is warm.   Psychiatric: She has a normal mood and affect. Her behavior is normal. Judgment and thought content normal.         Current Outpatient Medications on File Prior to Visit   Medication Sig Dispense Refill   • aspirin 81 MG EC tablet Take 81 mg by mouth Daily.     • Cranberry 250 MG tablet Take  by mouth.     • lisinopril (PRINIVIL,ZESTRIL) 10 MG tablet      • Multiple Vitamins-Minerals (MULTIVITAMIN ADULT) tablet Take  by mouth.     • pantoprazole (PROTONIX) 40 MG EC tablet      • pravastatin (PRAVACHOL) 40 MG tablet      • vitamin C (ASCORBIC ACID) 500 MG tablet Take 500 mg by mouth Daily.     • vitamin E 400 UNIT capsule Take 400 Units by mouth Daily.     • capecitabine (XELODA) 500 MG chemo tablet Take 3 tabs (1500 mg) by mouth twice per day Mon-Fri with radiation. 120 tablet 0   • Multiple Vitamins-Minerals (PRESERVISION AREDS 2 PO) Take  by mouth.       No current  facility-administered medications on file prior to visit.        ALLERGIES:    Allergies   Allergen Reactions   • Codeine Rash   • Lipitor [Atorvastatin] Rash       There were no vitals taken for this visit.     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  I have personally reviewed her imaging including PET scan and records from referring physicians.  I discussed with Mrs. Garcia and her  our recommendation for concurrent chemo and radiation consisting of xeloda and mitomycin C.  I  discussed in detail the risks, benefits and rationale of radiation therapy for anal cancer to include but not limited to the following:    Acute:  skin erythema, breakdown, fatigue, lowered blood counts, risk of infection, nausea, vomiting, abdominal cramping, diarrhea, bowel or bladder incontinence, urinary urgency, hesitancy, frequency or dysuria, pain with bowel movements    Late:  persistant fatigue, lowered blood counts, bowel ulceration, small bowel obstruction, anal stricture, persistant bowel incontinence or frequency, hip fracture, shrinkage of the bladder resulting in increased urinary frequency, vaginal fibrosis and foreshortening and the remote risk of second malignancies    Ms. Garcia voiced understanding and an informed consent was signed and placed on the chart today.  We will proceed with CT simualtion for treatment planning purposes today and begin her treatments concurrently with the chemo on Monday February 17.  I plan to deliver a dose of 50-54Gy to the primary tumor. I have also scheduled her to be evaluated by a gynecologist, Dr. Cassie Nick, with [facility] and the first available appointment was February 19.      I spent greater than 60 minutes in face-to-face time with the patient and 45 minutes of that time were spent in counseling and coordination of care, including review of imaging and pathology;  indications, goals, logistics, alternatives and risks - both common and rare - for my recommendations as well as surveillance and potential outcomes.           Thank you very much for allowing me to participate in the care of this very pleasant patient.    Sincerely,      Mariana Beyer MD

## 2020-02-10 ENCOUNTER — DOCUMENTATION (OUTPATIENT)
Dept: ONCOLOGY | Facility: CLINIC | Age: 80
End: 2020-02-10

## 2020-02-11 ENCOUNTER — HOSPITAL ENCOUNTER (OUTPATIENT)
Dept: GENERAL RADIOLOGY | Facility: HOSPITAL | Age: 80
Discharge: HOME | End: 2020-02-11
Admitting: RADIOLOGY

## 2020-02-11 VITALS
RESPIRATION RATE: 18 BRPM | SYSTOLIC BLOOD PRESSURE: 128 MMHG | DIASTOLIC BLOOD PRESSURE: 84 MMHG | OXYGEN SATURATION: 93 % | TEMPERATURE: 97.8 F | HEART RATE: 84 BPM

## 2020-02-11 DIAGNOSIS — I87.8 POOR VENOUS ACCESS: ICD-10-CM

## 2020-02-11 PROCEDURE — 25010000003 LIDOCAINE 1 % SOLUTION: Performed by: RADIOLOGY

## 2020-02-11 PROCEDURE — C1751 CATH, INF, PER/CENT/MIDLINE: HCPCS

## 2020-02-11 RX ORDER — LIDOCAINE HYDROCHLORIDE 10 MG/ML
10 INJECTION, SOLUTION INFILTRATION; PERINEURAL ONCE
Status: COMPLETED | OUTPATIENT
Start: 2020-02-11 | End: 2020-02-11

## 2020-02-11 RX ADMIN — LIDOCAINE HYDROCHLORIDE 4 ML: 10 INJECTION, SOLUTION INFILTRATION; PERINEURAL at 13:03

## 2020-02-11 NOTE — DISCHARGE INSTRUCTIONS
PICC Home Care Guide    A peripherally inserted central catheter (PICC) is a form of IV access that allows medicines and IV fluids to be quickly distributed throughout the body. The PICC is a long, thin, flexible tube (catheter) that is inserted into a vein in the upper arm. The catheter ends in a large vein in the chest (superior vena cava, or SVC). After the PICC is inserted, a chest X-ray may be done to make sure that it is in the correct place.  A PICC may be placed for different reasons, such as:  · To give medicines and liquid nutrition.  · To give IV fluids and blood products.  · If there is trouble placing a peripheral intravenous (PIV) catheter.  If taken care of properly, a PICC can remain in place for several months. Having a PICC can also allow a person to go home from the hospital sooner. Medicine and PICC care can be managed at home by a family member, caregiver, or home health care team.  What are the risks?  Generally, having a PICC is safe. However, problems may occur, including:  · A blood clot (thrombus) forming in or at the tip of the PICC.  · A blood clot forming in a vein (deep vein thrombosis) or traveling to the lung (pulmonary embolism).  · Inflammation of the vein (phlebitis) in which the PICC is placed.  · Infection. Central line associated blood stream infection (CLABSI) is a serious infection that often requires hospitalization.  · PICC movement (malposition). The PICC tip may move from its original position due to excessive physical activity, forceful coughing, sneezing, or vomiting.  · A break or cut in the PICC. It is important not to use scissors near the PICC.  · Nerve or tendon irritation or injury during PICC insertion.  How to take care of your PICC  Preventing problems  · You and any caregivers should wash your hands often with soap. Wash hands:  ? Before touching the PICC line or the infusion device.  ? Before changing a bandage (dressing).  · Flush the PICC as told by your  health care provider. Let your health care provider know right away if the PICC is hard to flush or does not flush. Do not use force to flush the PICC.  · Do not use a syringe that is less than 10 mL to flush the PICC.  · Avoid blood pressure checks on the arm in which the PICC is placed.  · Never pull or tug on the PICC.  · Do not take the PICC out yourself. Only a trained clinical professional should remove the PICC.  · Use clean and sterile supplies only. Keep the supplies in a dry place. Do not reuse needles, syringes, or any other supplies. Doing that can lead to infection.  · Keep pets and children away from your PICC line.  · Check the PICC insertion site every day for signs of infection. Check for:  ? Leakage.  ? Redness, swelling, or pain.  ? Fluid or blood.  ? Warmth.  ? Pus or a bad smell.  PICC dressing care  · Keep your PICC bandage (dressing) clean and dry to prevent infection.  · Do not take baths, swim, or use a hot tub until your health care provider approves. Ask your health care provider if you can take showers. You may only be allowed to take sponge baths for bathing. When you are allowed to shower:  ? Ask your health care provider to teach you how to wrap the PICC line.  ? Cover the PICC line with clear plastic wrap and tape to keep it dry while showering.  · Follow instructions from your health care provider about how to take care of your insertion site and dressing. Make sure you:  ? Wash your hands with soap and water before you change your bandage (dressing). If soap and water are not available, use hand .  ? Change your dressing as told by your health care provider.  ? Leave stitches (sutures), skin glue, or adhesive strips in place. These skin closures may need to stay in place for 2 weeks or longer. If adhesive strip edges start to loosen and curl up, you may trim the loose edges. Do not remove adhesive strips completely unless your health care provider tells you to do  "that.  · Change your PICC dressing if it becomes loose or wet.  General instructions    · Carry your PICC identification card or wear a medical alert bracelet at all times.  · Keep the tube clamped at all times, unless it is being used.  · Carry a smooth-edge clamp with you at all times to place on the tube if it breaks.  · Do not use scissors or sharp objects near the tube.  · You may bend your arm and move it freely. If your PICC is near or at the bend of your elbow, avoid activity with repeated motion at the elbow.  · Avoid lifting heavy objects as told by your health care provider.  · Keep all follow-up visits as told by your health care provider. This is important.  Disposal of supplies  · Throw away any syringes in a disposal container that is meant for sharp items (sharps container). You can buy a sharps container from a pharmacy, or you can make one by using an empty hard plastic bottle with a cover.  · Place any used dressings or infusion bags into a plastic bag. Throw that bag in the trash.  Contact a health care provider if:  · You have pain in your arm, ear, face, or teeth.  · You have a fever or chills.  · You have redness, swelling, or pain around the insertion site.  · You have fluid or blood coming from the insertion site.  · Your insertion site feels warm to the touch.  · You have pus or a bad smell coming from the insertion site.  · Your skin feels hard and raised around the insertion site.  Get help right away if:  · Your PICC is accidentally pulled all the way out. If this happens, cover the insertion site with a bandage or gauze dressing. Do not throw the PICC away. Your health care provider will need to check it.  · Your PICC was tugged or pulled and has partially come out. Do not  push the PICC back in.  · You cannot flush the PICC, it is hard to flush, or the PICC leaks around the insertion site when it is flushed.  · You hear a \"flushing\" sound when the PICC is flushed.  · You feel your " heart racing or skipping beats.  · There is a hole or tear in the PICC.  · You have swelling in the arm in which the PICC was inserted.  · You have a red streak going up your arm from where the PICC was inserted.  Summary  · A peripherally inserted central catheter (PICC) is a long, thin, flexible tube (catheter) that is inserted into a vein in the upper arm.  · The PICC is inserted using a sterile technique by a specially trained nurse or physician. Only a trained clinical professional should remove it.  · Keep your PICC identification card with you at all times.  · Avoid blood pressure checks on the arm in which the PICC is placed.  · If cared for properly, a PICC can remain in place for several months. Having a PICC can also allow a person to go home from the hospital sooner.  This information is not intended to replace advice given to you by your health care provider. Make sure you discuss any questions you have with your health care provider.  Document Released: 06/23/2004 Document Revised: 01/20/2018 Document Reviewed: 01/20/2018  [employer] Interactive Patient Education © 2019 [employer]  PICC Insertion    A peripherally inserted central catheter (PICC) is a form of IV access that allows medicines and IV fluids to be quickly distributed throughout the body. The PICC is a thin, flexible tube (catheter) that is inserted into a vein in your arm or leg. The PICC is guided through your veins until the tip sits in a large vein just outside your heart (superior vena cava or SVC). After the PICC is inserted, a chest X-ray may be done to make sure that it is in the correct place.  Only a health care provider trained in PICC insertion will do the procedure.  You may have a PICC placed:  · To give medicines and nutrition.  · To rapidly give fluids or blood products.  · To take frequent blood samples.  · If there is a problem placing a peripheral intravenous (PIV) catheter.  Tell a health care provider about:  · Any  allergies you have.  · All medicines you are taking, including vitamins, herbs, eye drops, creams, and over-the-counter medicines.  · Any problems you or family members have had with anesthetic medicines.  · Any blood disorders you have.  · Any surgeries you have had.  · Any medical conditions you have.  · Whether you are pregnant or may be pregnant.  What are the risks?  Generally, this is a safe procedure. However, problems may occur, including:  · Bleeding. This may happen at the insertion site or internally.  · Infection. This may occur at the insertion site or in the blood.  · Movement or malposition of the PICC.  · Inflammation of the vein (phlebitis).  · Nerve injury or irritation.  · Clot formation at the tip of the PICC line.  · Blood clot in the lung (pulmonary embolus).  · Injury or collapse of the lung (pneumothorax).  · Injury to the large blood vessels or the heart. This is rare.  What happens before the procedure?  · Ask your health care provider about:  ? Changing or stopping your regular medicines. This is especially important if you are taking diabetes medicines or blood thinners.  ? Taking over-the-counter medicines, vitamins, herbs, and supplements.  ? Taking medicines such as aspirin and ibuprofen. These medicines can thin your blood. Do not take these medicines unless your health care provider tells you to take them.  · You may have blood tests. These tests can help tell how well your blood clots.  · Plan to have someone take you home from the hospital or clinic.  · Plan to have a responsible adult care for you for at least 24 hours after you leave the hospital or clinic. This is important.  · Follow instructions from your health care provider about eating or drinking restrictions.  What happens during the procedure?  · To reduce your risk of infection:  ? Your health care team will wash or sanitize their hands.  ? Your skin will be washed with soap.  ? Hair may be removed from the surgical  area.  · Your health care provider will identify a vein into which the PICC line will be inserted. This may be done using ultrasound or X-ray guidance (fluoroscopy).  · You will be given one or more of the following:  ? A medicine to help you relax (sedative).  ? A medicine to numb the area (local anesthetic).  · A tourniquet will be placed on your arm.  · A small needle will be inserted into the vein and then a small guidewire will be advanced into the SVC.  · The catheter will be advanced over the guidewire and moved into position. The guidewire will be removed.  · The catheter will be flushed and blood will be drawn back to make sure it is in the vein.  · If this was done without X-ray guidance, an X-ray will be needed to make sure that the catheter tip is in the correct position.  · Once the placement is confirmed, the PICC will be secured to the skin. This may be done with a device, such as tape, or sutures (stitches).  · A bandage (dressing) will be placed over the PICC insertion site.  The procedure may vary among health care providers and hospitals.  What happens after the procedure?  · You will be told how to care for your PICC line.  · Do not drive for 24 hours if you were given a sedative.  · Your blood pressure, heart rate, breathing rate, and blood oxygen level will be monitored until the medicines you were given have worn off.  Summary  · A peripherally inserted central catheter (PICC) is a form of IV access that allows medicines and IV fluids to be quickly distributed throughout the body.  · The PICC is a long, thin, flexible tube that is inserted into a vein in your arm or leg and then guided to a large vein (SVC) in your chest.  · You will be instructed on how to care for your PICC line.  This information is not intended to replace advice given to you by your health care provider. Make sure you discuss any questions you have with your health care provider.  Document Released: 10/08/2014 Document  Revised: 02/12/2018 Document Reviewed: 02/12/2018  [employer] Interactive Patient Education © 2019 [employer]

## 2020-02-12 ENCOUNTER — APPOINTMENT (OUTPATIENT)
Dept: ONCOLOGY | Facility: HOSPITAL | Age: 80
End: 2020-02-12

## 2020-02-12 ENCOUNTER — OFFICE VISIT (OUTPATIENT)
Dept: ONCOLOGY | Facility: CLINIC | Age: 80
End: 2020-02-12

## 2020-02-12 ENCOUNTER — LAB (OUTPATIENT)
Dept: ONCOLOGY | Facility: HOSPITAL | Age: 80
End: 2020-02-12

## 2020-02-12 VITALS
TEMPERATURE: 97.9 F | SYSTOLIC BLOOD PRESSURE: 123 MMHG | OXYGEN SATURATION: 92 % | DIASTOLIC BLOOD PRESSURE: 73 MMHG | RESPIRATION RATE: 18 BRPM | HEIGHT: 61 IN | BODY MASS INDEX: 28.13 KG/M2 | WEIGHT: 149 LBS | HEART RATE: 95 BPM

## 2020-02-12 DIAGNOSIS — C21.0 ANAL CANCER (HCC): ICD-10-CM

## 2020-02-12 LAB
ALBUMIN SERPL-MCNC: 4.2 G/DL (ref 3.5–5.2)
ALBUMIN/GLOB SERPL: 1.2 G/DL (ref 1.1–2.4)
ALP SERPL-CCNC: 103 U/L (ref 38–116)
ALT SERPL W P-5'-P-CCNC: 14 U/L (ref 0–33)
ANION GAP SERPL CALCULATED.3IONS-SCNC: 12.1 MMOL/L (ref 5–15)
AST SERPL-CCNC: 20 U/L (ref 0–32)
BASOPHILS # BLD AUTO: 0.07 10*3/MM3 (ref 0–0.2)
BASOPHILS NFR BLD AUTO: 0.6 % (ref 0–1.5)
BILIRUB SERPL-MCNC: 0.3 MG/DL (ref 0.2–1.2)
BUN BLD-MCNC: 23 MG/DL (ref 6–20)
BUN/CREAT SERPL: 28.8 (ref 7.3–30)
CALCIUM SPEC-SCNC: 9.4 MG/DL (ref 8.5–10.2)
CHLORIDE SERPL-SCNC: 102 MMOL/L (ref 98–107)
CO2 SERPL-SCNC: 23.9 MMOL/L (ref 22–29)
CREAT BLD-MCNC: 0.8 MG/DL (ref 0.6–1.1)
DEPRECATED RDW RBC AUTO: 45.6 FL (ref 37–54)
EOSINOPHIL # BLD AUTO: 0.23 10*3/MM3 (ref 0–0.4)
EOSINOPHIL NFR BLD AUTO: 2 % (ref 0.3–6.2)
ERYTHROCYTE [DISTWIDTH] IN BLOOD BY AUTOMATED COUNT: 12.6 % (ref 12.3–15.4)
GFR SERPL CREATININE-BSD FRML MDRD: 70 ML/MIN/1.73
GLOBULIN UR ELPH-MCNC: 3.6 GM/DL (ref 1.8–3.5)
GLUCOSE BLD-MCNC: 95 MG/DL (ref 74–124)
HCT VFR BLD AUTO: 39.1 % (ref 34–46.6)
HGB BLD-MCNC: 12.6 G/DL (ref 12–15.9)
IMM GRANULOCYTES # BLD AUTO: 0.03 10*3/MM3 (ref 0–0.05)
IMM GRANULOCYTES NFR BLD AUTO: 0.3 % (ref 0–0.5)
LYMPHOCYTES # BLD AUTO: 3.04 10*3/MM3 (ref 0.7–3.1)
LYMPHOCYTES NFR BLD AUTO: 27 % (ref 19.6–45.3)
MCH RBC QN AUTO: 31.7 PG (ref 26.6–33)
MCHC RBC AUTO-ENTMCNC: 32.2 G/DL (ref 31.5–35.7)
MCV RBC AUTO: 98.2 FL (ref 79–97)
MONOCYTES # BLD AUTO: 1 10*3/MM3 (ref 0.1–0.9)
MONOCYTES NFR BLD AUTO: 8.9 % (ref 5–12)
NEUTROPHILS # BLD AUTO: 6.89 10*3/MM3 (ref 1.7–7)
NEUTROPHILS NFR BLD AUTO: 61.2 % (ref 42.7–76)
NRBC BLD AUTO-RTO: 0 /100 WBC (ref 0–0.2)
PLATELET # BLD AUTO: 361 10*3/MM3 (ref 140–450)
PMV BLD AUTO: 8.6 FL (ref 6–12)
POTASSIUM BLD-SCNC: 4.2 MMOL/L (ref 3.5–4.7)
PROT SERPL-MCNC: 7.8 G/DL (ref 6.3–8)
RBC # BLD AUTO: 3.98 10*6/MM3 (ref 3.77–5.28)
SODIUM BLD-SCNC: 138 MMOL/L (ref 134–145)
WBC NRBC COR # BLD: 11.26 10*3/MM3 (ref 3.4–10.8)

## 2020-02-12 PROCEDURE — 80053 COMPREHEN METABOLIC PANEL: CPT

## 2020-02-12 PROCEDURE — 99214 OFFICE O/P EST MOD 30 MIN: CPT | Performed by: INTERNAL MEDICINE

## 2020-02-12 PROCEDURE — 36415 COLL VENOUS BLD VENIPUNCTURE: CPT

## 2020-02-12 PROCEDURE — 85025 COMPLETE CBC W/AUTO DIFF WBC: CPT

## 2020-02-12 RX ORDER — MITOMYCIN 20 MG/40ML
15 INJECTION, POWDER, LYOPHILIZED, FOR SOLUTION INTRAVENOUS ONCE
Status: CANCELLED | OUTPATIENT
Start: 2020-02-17

## 2020-02-12 RX ORDER — SODIUM CHLORIDE 9 MG/ML
250 INJECTION, SOLUTION INTRAVENOUS ONCE
Status: CANCELLED | OUTPATIENT
Start: 2020-02-17

## 2020-02-12 RX ORDER — SODIUM CHLORIDE 9 MG/ML
250 INJECTION, SOLUTION INTRAVENOUS ONCE
OUTPATIENT
Start: 2020-03-16

## 2020-02-14 PROCEDURE — 77300 RADIATION THERAPY DOSE PLAN: CPT | Performed by: RADIOLOGY

## 2020-02-14 PROCEDURE — 77301 RADIOTHERAPY DOSE PLAN IMRT: CPT | Performed by: RADIOLOGY

## 2020-02-14 PROCEDURE — 77338 DESIGN MLC DEVICE FOR IMRT: CPT | Performed by: RADIOLOGY

## 2020-02-17 ENCOUNTER — INFUSION (OUTPATIENT)
Dept: ONCOLOGY | Facility: HOSPITAL | Age: 80
End: 2020-02-17

## 2020-02-17 ENCOUNTER — APPOINTMENT (OUTPATIENT)
Dept: ONCOLOGY | Facility: HOSPITAL | Age: 80
End: 2020-02-17

## 2020-02-17 VITALS
SYSTOLIC BLOOD PRESSURE: 137 MMHG | OXYGEN SATURATION: 92 % | WEIGHT: 148 LBS | DIASTOLIC BLOOD PRESSURE: 82 MMHG | HEART RATE: 94 BPM | BODY MASS INDEX: 27.98 KG/M2 | TEMPERATURE: 97.8 F

## 2020-02-17 DIAGNOSIS — C21.0 ANAL CANCER (HCC): Primary | ICD-10-CM

## 2020-02-17 LAB
ALBUMIN SERPL-MCNC: 4 G/DL (ref 3.5–5.2)
ALBUMIN/GLOB SERPL: 1.1 G/DL (ref 1.1–2.4)
ALP SERPL-CCNC: 102 U/L (ref 38–116)
ALT SERPL W P-5'-P-CCNC: 15 U/L (ref 0–33)
ANION GAP SERPL CALCULATED.3IONS-SCNC: 14.7 MMOL/L (ref 5–15)
AST SERPL-CCNC: 19 U/L (ref 0–32)
BASOPHILS # BLD AUTO: 0.08 10*3/MM3 (ref 0–0.2)
BASOPHILS NFR BLD AUTO: 0.7 % (ref 0–1.5)
BILIRUB SERPL-MCNC: 0.2 MG/DL (ref 0.2–1.2)
BUN BLD-MCNC: 16 MG/DL (ref 6–20)
BUN/CREAT SERPL: 20.5 (ref 7.3–30)
CALCIUM SPEC-SCNC: 9 MG/DL (ref 8.5–10.2)
CHLORIDE SERPL-SCNC: 102 MMOL/L (ref 98–107)
CO2 SERPL-SCNC: 23.3 MMOL/L (ref 22–29)
CREAT BLD-MCNC: 0.78 MG/DL (ref 0.6–1.1)
DEPRECATED RDW RBC AUTO: 45.8 FL (ref 37–54)
EOSINOPHIL # BLD AUTO: 0.26 10*3/MM3 (ref 0–0.4)
EOSINOPHIL NFR BLD AUTO: 2.2 % (ref 0.3–6.2)
ERYTHROCYTE [DISTWIDTH] IN BLOOD BY AUTOMATED COUNT: 12.8 % (ref 12.3–15.4)
GFR SERPL CREATININE-BSD FRML MDRD: 72 ML/MIN/1.73
GLOBULIN UR ELPH-MCNC: 3.6 GM/DL (ref 1.8–3.5)
GLUCOSE BLD-MCNC: 90 MG/DL (ref 74–124)
HCT VFR BLD AUTO: 38.6 % (ref 34–46.6)
HGB BLD-MCNC: 12.4 G/DL (ref 12–15.9)
IMM GRANULOCYTES # BLD AUTO: 0.03 10*3/MM3 (ref 0–0.05)
IMM GRANULOCYTES NFR BLD AUTO: 0.3 % (ref 0–0.5)
LYMPHOCYTES # BLD AUTO: 3.31 10*3/MM3 (ref 0.7–3.1)
LYMPHOCYTES NFR BLD AUTO: 28.5 % (ref 19.6–45.3)
MCH RBC QN AUTO: 31.5 PG (ref 26.6–33)
MCHC RBC AUTO-ENTMCNC: 32.1 G/DL (ref 31.5–35.7)
MCV RBC AUTO: 98 FL (ref 79–97)
MONOCYTES # BLD AUTO: 1.01 10*3/MM3 (ref 0.1–0.9)
MONOCYTES NFR BLD AUTO: 8.7 % (ref 5–12)
NEUTROPHILS # BLD AUTO: 6.92 10*3/MM3 (ref 1.7–7)
NEUTROPHILS NFR BLD AUTO: 59.6 % (ref 42.7–76)
NRBC BLD AUTO-RTO: 0 /100 WBC (ref 0–0.2)
PLATELET # BLD AUTO: 333 10*3/MM3 (ref 140–450)
PMV BLD AUTO: 8.4 FL (ref 6–12)
POTASSIUM BLD-SCNC: 4.1 MMOL/L (ref 3.5–4.7)
PROT SERPL-MCNC: 7.6 G/DL (ref 6.3–8)
RBC # BLD AUTO: 3.94 10*6/MM3 (ref 3.77–5.28)
SODIUM BLD-SCNC: 140 MMOL/L (ref 134–145)
WBC NRBC COR # BLD: 11.61 10*3/MM3 (ref 3.4–10.8)

## 2020-02-17 PROCEDURE — 25010000002 MITOMYCIN PER 5 MG: Performed by: INTERNAL MEDICINE

## 2020-02-17 PROCEDURE — 77427 RADIATION TX MANAGEMENT X5: CPT | Performed by: RADIOLOGY

## 2020-02-17 PROCEDURE — 96409 CHEMO IV PUSH SNGL DRUG: CPT

## 2020-02-17 PROCEDURE — 96375 TX/PRO/DX INJ NEW DRUG ADDON: CPT

## 2020-02-17 PROCEDURE — 77386 CHG INTENSITY MODULATED RADIATION TX DLVR COMPLEX: CPT | Performed by: RADIOLOGY

## 2020-02-17 PROCEDURE — 80053 COMPREHEN METABOLIC PANEL: CPT

## 2020-02-17 PROCEDURE — 77386: CPT | Performed by: RADIOLOGY

## 2020-02-17 PROCEDURE — 77014 CHG CT GUIDANCE RADIATION THERAPY FLDS PLACEMENT: CPT | Performed by: RADIOLOGY

## 2020-02-17 PROCEDURE — 25010000002 DEXAMETHASONE SODIUM PHOSPHATE 100 MG/10ML SOLUTION: Performed by: INTERNAL MEDICINE

## 2020-02-17 PROCEDURE — 85025 COMPLETE CBC W/AUTO DIFF WBC: CPT

## 2020-02-17 RX ORDER — SODIUM CHLORIDE 9 MG/ML
250 INJECTION, SOLUTION INTRAVENOUS ONCE
Status: COMPLETED | OUTPATIENT
Start: 2020-02-17 | End: 2020-02-17

## 2020-02-17 RX ORDER — ONDANSETRON HYDROCHLORIDE 8 MG/1
8 TABLET, FILM COATED ORAL 3 TIMES DAILY PRN
Qty: 30 TABLET | Refills: 5 | Status: ON HOLD | OUTPATIENT
Start: 2020-02-17 | End: 2020-08-07

## 2020-02-17 RX ORDER — CAPECITABINE 500 MG/1
1500 TABLET, FILM COATED ORAL 2 TIMES DAILY
Start: 2020-02-17 | End: 2020-03-05 | Stop reason: SDUPTHER

## 2020-02-17 RX ORDER — MITOMYCIN 20 MG/40ML
15 INJECTION, POWDER, LYOPHILIZED, FOR SOLUTION INTRAVENOUS ONCE
Status: COMPLETED | OUTPATIENT
Start: 2020-02-17 | End: 2020-02-17

## 2020-02-17 RX ADMIN — MITOMYCIN 15 MG: 20 INJECTION, POWDER, LYOPHILIZED, FOR SOLUTION INTRAVENOUS at 16:35

## 2020-02-17 RX ADMIN — SODIUM CHLORIDE 250 ML: 9 INJECTION, SOLUTION INTRAVENOUS at 16:01

## 2020-02-17 RX ADMIN — DEXAMETHASONE SODIUM PHOSPHATE 12 MG: 10 INJECTION, SOLUTION INTRAMUSCULAR; INTRAVENOUS at 16:01

## 2020-02-17 NOTE — PROGRESS NOTES
Printed info from [facility] Web provided for Mitomycin. Reviewed possible side effects and discussed need to call for fever of 100.5 or above. Patient verbalized understanding and written consent obtained. Patient confirms she is taking Xeloda bid on radiation days. Mitomycin given slow IV push through side arm of free flowing IVF of NS with excellent blood return obtained.

## 2020-02-18 PROCEDURE — 77386 CHG INTENSITY MODULATED RADIATION TX DLVR COMPLEX: CPT | Performed by: RADIOLOGY

## 2020-02-18 PROCEDURE — 77386: CPT | Performed by: RADIOLOGY

## 2020-02-18 PROCEDURE — 77014 CHG CT GUIDANCE RADIATION THERAPY FLDS PLACEMENT: CPT | Performed by: RADIOLOGY

## 2020-02-18 PROCEDURE — 77470 SPECIAL RADIATION TREATMENT: CPT | Performed by: RADIOLOGY

## 2020-02-19 ENCOUNTER — RADIATION ONCOLOGY WEEKLY ASSESSMENT (OUTPATIENT)
Dept: RADIATION ONCOLOGY | Facility: HOSPITAL | Age: 80
End: 2020-02-19

## 2020-02-19 ENCOUNTER — TELEPHONE (OUTPATIENT)
Dept: ONCOLOGY | Facility: HOSPITAL | Age: 80
End: 2020-02-19

## 2020-02-19 VITALS
OXYGEN SATURATION: 96 % | WEIGHT: 148 LBS | BODY MASS INDEX: 27.98 KG/M2 | DIASTOLIC BLOOD PRESSURE: 73 MMHG | HEART RATE: 88 BPM | SYSTOLIC BLOOD PRESSURE: 131 MMHG

## 2020-02-19 DIAGNOSIS — C21.0 ANAL CANCER (HCC): Primary | ICD-10-CM

## 2020-02-19 PROCEDURE — 77014 CHG CT GUIDANCE RADIATION THERAPY FLDS PLACEMENT: CPT | Performed by: RADIOLOGY

## 2020-02-19 PROCEDURE — 77386: CPT | Performed by: RADIOLOGY

## 2020-02-19 NOTE — TELEPHONE ENCOUNTER
Follow up call made after patient received first dose of Mitomycin 2 days ago.  No answer, but message left on answering machine to call for any problems or concerns.

## 2020-02-19 NOTE — PROGRESS NOTES
Physician Weekly Management Note    Diagnosis:     Diagnosis Plan   1. Anal cancer (CMS/HCC)         RT Details:  fx 3/30 pelvis and dari  Notes on Treatment course, Films, Medical progress:  Doing well so far, concurrent chemo, no abd pain or nausea    Weekly Management:  Medication reviewed?   Yes  New medications given?   No  Problemlist reviewed?   Yes  Problem added?   No  Issues raised requiring referral to support services - task assigned to:  na    Technical aspects reviewed:  Weekly OBI approved?   Yes  Weekly port films approved?   Yes  Change requests noted on port film?   No  Patient setup and plan reviewed?   Yes    Chart Reviewed:  Continue current treatment plan?   Yes  Treatment plan change requested?   No  CBC reviewed?   Yes  Concurrent Chemo?   Yes    Objective     Toxicities:   none     Review of Systems   Constitutional: Negative.    Gastrointestinal: Negative.    Genitourinary: Negative.           There were no vitals filed for this visit.    Current Status 2/17/2020   ECOG score 0       Physical Exam   Constitutional: She appears well-developed and well-nourished.           Problem Summary List    Diagnosis:     Diagnosis Plan   1. Anal cancer (CMS/HCC)       Pathology:   Anal squamous cell    Past Medical History:   Diagnosis Date   • Anal cancer (CMS/HCC) 01/2020    SQUAMOUS CELL CARCINOMA, FOLLOWED BY DR. JOVITA BAUTISTA   • Aortic atherosclerosis (CMS/[facility])    • Arthritis    • Boil    • Chronic constipation    • Colon polyps    • Depression    • Encounter for screening mammogram for breast cancer     09/2014, 10/2014, 11/2015, 11/2016, 11/2017, 11/2018, 11/2019.   • Exudative senile macular retinal degeneration (CMS/[facility])    • GERD (gastroesophageal reflux disease)     FOLLOWED BY DR. 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)    • Rectal  bleeding 2019   • Senile purpura (CMS/HCC)    • Transfusion history     Hx of blood transfussion.         Past Surgical History:   Procedure Laterality Date   • COLONOSCOPY W/ POLYPECTOMY N/A 01/03/2020    LARGE MASS IN ANORECTAL AREA, BX: SQUAMOUS CELL CARCINOMA, 3 TUBULAR ADENOMA POLYPS IN ASCENDING, 3 TUBULAR ADENOMA POLYPS IN TRANSVERSE, MELANOSIS COLI, DR. IMTIAZ SELLERS AT [facility]    • ENDOSCOPY N/A 01/03/2020    DR. IMTIAZ SELLERS AT [facility]   • HYSTERECTOMY N/A 1993         Current Outpatient Medications on File Prior to Visit   Medication Sig Dispense Refill   • aspirin 81 MG EC tablet Take 81 mg by mouth Daily.     • capecitabine (XELODA) 500 MG chemo tablet Take 3 tabs (1500 mg) by mouth twice per day Mon-Fri with radiation. 120 tablet 0   • capecitabine (XELODA) 500 MG chemo tablet Take 3 tablets by mouth 2 (Two) Times a Day. Take 3 tab(s) by mouth in the AM and 3 tab(s) by mouth in the PM on Monday-Friday with radiation.     • Cranberry 250 MG tablet Take  by mouth.     • lisinopril (PRINIVIL,ZESTRIL) 10 MG tablet      • Multiple Vitamins-Minerals (MULTIVITAMIN ADULT) tablet Take  by mouth.     • Multiple Vitamins-Minerals (PRESERVISION AREDS 2 PO) Take  by mouth.     • ondansetron (ZOFRAN) 8 MG tablet Take 1 tablet by mouth 3 (Three) Times a Day As Needed for Nausea or Vomiting. 30 tablet 5   • pantoprazole (PROTONIX) 40 MG EC tablet      • pravastatin (PRAVACHOL) 40 MG tablet      • vitamin C (ASCORBIC ACID) 500 MG tablet Take 500 mg by mouth Daily.     • vitamin E 400 UNIT capsule Take 400 Units by mouth Daily.       No current facility-administered medications on file prior to visit.        Allergies   Allergen Reactions   • Codeine Rash   • Lipitor [Atorvastatin] Rash         Referring Provider:    No referring provider defined for this encounter.    Oncologist:  No primary care provider on file.      Seen and approved by:  Mariana Beyer MD  02/19/2020

## 2020-02-20 ENCOUNTER — MEDICATION THERAPY MANAGEMENT (OUTPATIENT)
Dept: ONCOLOGY | Facility: HOSPITAL | Age: 80
End: 2020-02-20

## 2020-02-20 PROCEDURE — 77386 CHG INTENSITY MODULATED RADIATION TX DLVR COMPLEX: CPT | Performed by: RADIOLOGY

## 2020-02-20 PROCEDURE — 77014 CHG CT GUIDANCE RADIATION THERAPY FLDS PLACEMENT: CPT | Performed by: RADIOLOGY

## 2020-02-20 PROCEDURE — 77386: CPT | Performed by: RADIOLOGY

## 2020-02-20 NOTE — PROGRESS NOTES
MTM telephone encounter:  Call for adherence and side effects re: Xeloda.   Pt states xrt is going well.  She is taking her xeloda and received Mitomycin Monday 2/17/20.  She denies mouth sores, hand/foot redness, or diarrhea.  She was nauseated this am, but zofran alleviated her nausea well.  She voiced no other concerns.

## 2020-02-21 PROCEDURE — 77014 CHG CT GUIDANCE RADIATION THERAPY FLDS PLACEMENT: CPT | Performed by: RADIOLOGY

## 2020-02-21 PROCEDURE — 77386: CPT | Performed by: RADIOLOGY

## 2020-02-21 PROCEDURE — 77386 CHG INTENSITY MODULATED RADIATION TX DLVR COMPLEX: CPT | Performed by: RADIOLOGY

## 2020-02-24 ENCOUNTER — OFFICE VISIT (OUTPATIENT)
Dept: ONCOLOGY | Facility: CLINIC | Age: 80
End: 2020-02-24

## 2020-02-24 ENCOUNTER — INFUSION (OUTPATIENT)
Dept: ONCOLOGY | Facility: HOSPITAL | Age: 80
End: 2020-02-24

## 2020-02-24 ENCOUNTER — LAB (OUTPATIENT)
Dept: LAB | Facility: HOSPITAL | Age: 80
End: 2020-02-24

## 2020-02-24 VITALS
OXYGEN SATURATION: 93 % | DIASTOLIC BLOOD PRESSURE: 73 MMHG | BODY MASS INDEX: 28.02 KG/M2 | WEIGHT: 148.4 LBS | SYSTOLIC BLOOD PRESSURE: 112 MMHG | HEART RATE: 98 BPM | HEIGHT: 61 IN | RESPIRATION RATE: 12 BRPM | TEMPERATURE: 97.6 F

## 2020-02-24 DIAGNOSIS — C21.0 ANAL CANCER (HCC): ICD-10-CM

## 2020-02-24 DIAGNOSIS — C21.0 ANAL CANCER (HCC): Primary | ICD-10-CM

## 2020-02-24 LAB
BASOPHILS # BLD AUTO: 0.06 10*3/MM3 (ref 0–0.2)
BASOPHILS NFR BLD AUTO: 0.7 % (ref 0–1.5)
DEPRECATED RDW RBC AUTO: 41.2 FL (ref 37–54)
EOSINOPHIL # BLD AUTO: 0.07 10*3/MM3 (ref 0–0.4)
EOSINOPHIL NFR BLD AUTO: 0.8 % (ref 0.3–6.2)
ERYTHROCYTE [DISTWIDTH] IN BLOOD BY AUTOMATED COUNT: 11.9 % (ref 12.3–15.4)
HCT VFR BLD AUTO: 36 % (ref 34–46.6)
HGB BLD-MCNC: 12.5 G/DL (ref 12–15.9)
IMM GRANULOCYTES # BLD AUTO: 0.41 10*3/MM3 (ref 0–0.05)
IMM GRANULOCYTES NFR BLD AUTO: 4.9 % (ref 0–0.5)
LYMPHOCYTES # BLD AUTO: 1.45 10*3/MM3 (ref 0.7–3.1)
LYMPHOCYTES NFR BLD AUTO: 17.3 % (ref 19.6–45.3)
MCH RBC QN AUTO: 32.8 PG (ref 26.6–33)
MCHC RBC AUTO-ENTMCNC: 34.7 G/DL (ref 31.5–35.7)
MCV RBC AUTO: 94.5 FL (ref 79–97)
MONOCYTES # BLD AUTO: 0.51 10*3/MM3 (ref 0.1–0.9)
MONOCYTES NFR BLD AUTO: 6.1 % (ref 5–12)
NEUTROPHILS # BLD AUTO: 5.88 10*3/MM3 (ref 1.7–7)
NEUTROPHILS NFR BLD AUTO: 70.2 % (ref 42.7–76)
NRBC BLD AUTO-RTO: 0.2 /100 WBC (ref 0–0.2)
PLATELET # BLD AUTO: 239 10*3/MM3 (ref 140–450)
PMV BLD AUTO: 9.3 FL (ref 6–12)
RBC # BLD AUTO: 3.81 10*6/MM3 (ref 3.77–5.28)
WBC NRBC COR # BLD: 8.38 10*3/MM3 (ref 3.4–10.8)

## 2020-02-24 PROCEDURE — 77336 RADIATION PHYSICS CONSULT: CPT | Performed by: RADIOLOGY

## 2020-02-24 PROCEDURE — 77386 CHG INTENSITY MODULATED RADIATION TX DLVR COMPLEX: CPT | Performed by: RADIOLOGY

## 2020-02-24 PROCEDURE — 36415 COLL VENOUS BLD VENIPUNCTURE: CPT

## 2020-02-24 PROCEDURE — 85025 COMPLETE CBC W/AUTO DIFF WBC: CPT

## 2020-02-24 PROCEDURE — 77386: CPT | Performed by: RADIOLOGY

## 2020-02-24 PROCEDURE — 77014 CHG CT GUIDANCE RADIATION THERAPY FLDS PLACEMENT: CPT | Performed by: RADIOLOGY

## 2020-02-24 PROCEDURE — 99214 OFFICE O/P EST MOD 30 MIN: CPT | Performed by: NURSE PRACTITIONER

## 2020-02-24 RX ORDER — PROCHLORPERAZINE MALEATE 10 MG
10 TABLET ORAL EVERY 6 HOURS PRN
Qty: 40 TABLET | Refills: 2 | COMMUNITY
End: 2020-02-25 | Stop reason: SDUPTHER

## 2020-02-24 NOTE — PROGRESS NOTES
Subjective Discussed overall plan, use of PICC line    REASON FOR CONSULTATION: Anal carcinoma     REQUESTING PHYSICIAN: Rosa Christianson MD, Imtiaz Sellers MD    History of Present Illness    Patient is a 79-year-old female with the above-mentioned history here today for toxicity check.  She received her first dose of mitomycin on 2/17/2020.  She also initiated concurrent radiation and Xeloda that day.  She is on Xeloda 1500 mg twice daily on days of radiation.    So far, she is tolerating treatment fairly well.  She has had some nausea. She has tried taking her zofran but felt that it wasn't very helpful.  Denies vomiting.  She has chronic issues with constipation but denies diarrhea.  She denies skin rash.  She denies mouth sores.    Past Medical History:   Diagnosis Date   • Anal cancer (CMS/HCC) 01/2020    SQUAMOUS CELL CARCINOMA, FOLLOWED BY DR. JOVITA BAUTISTA   • Aortic atherosclerosis (CMS/HCC)    • Arthritis    • Boil    • Chronic constipation    • Colon polyps    • Depression    • Encounter for screening mammogram for breast cancer     09/2014, 10/2014, 11/2015, 11/2016, 11/2017, 11/2018, 11/2019.   • Exudative senile macular retinal degeneration (CMS/HCC)    • GERD (gastroesophageal reflux disease)     FOLLOWED BY DR. 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       Hematologic/oncologic history: The patient is a 79-year-old female followed (according to record) by primary care with depression, pulmonary hypertension, macular degeneration and retinal degeneration as well as aortic atherosclerosis.  She was seen December 10, 2019 by primary care plans to continue aspirin, lisinopril, pantoprazole and pravastatin.She had been noting bright red blood  per rectum since the fall, 2019 with the presumption that she had hemorrhoidal bleeding.  She also indicates that she has been chronically constipated now requiring a laxative on a regular basis.  She has been tested previously with hemoccult but requested that this was not going to be helpful with her symptoms.  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     Review of the procedure note by Dr. Zen Franco January 3, 2020 indicates that there is a large mass at the anorectal area likely in the distal rectum, cauliflower with ulceration multiple biopsies taken. She has not been evaluated by colorectal surgery thus far.  The patient requested assessment and [facility] and is seen with her  and granddaughter.  She has not had weight loss, night sweats, fever, chills though has recognized change in bowel function as described above with bright red blood per rectum for several months.     The patient was assessed January 27 and felt to have an apparent anal carcinoma that extended into the rectum.  As result she was asked to undergo PET/CT scanning and assessment by colorectal surgery.  PET/CT demonstrated the primary tumor as an oblong tumor involving the 4 inferior SPECT SPECT the rectum measuring 5 cm x 3.6 cm with intense hypermetabolism at 33.1.  There is no evidence of disease within the abdomen pelvis with mild hypermetabolism with borderline enlarged bilateral hilar and subcarinal lymph nodes with SUV up to 6.0.  These are suspected to be inflammatory in nature.  The patient was seen by colorectal surgery January 31 and felt to have an anal squamous cell carcinoma and referred for chemoradiation therapy as  well as GYN for evaluation continue risk of HPV related malignancies.  The patient is now scheduled to be seen by radiation therapy on February 7, 2001.     The patient is seen back February 4, 2020 and we have again discussed concurrent chemoradiotherapy rather than surgery using concurrent 5-FU (in this case capecitabine)and mitomycin.  This is a combination of 10 mg/m²-maximum 15 mg IV of mitomycin given through a free-flowing line, capecitabine is 825 mg meter squared on radiation days through the completion of radiation therapy.  This is been discussed in detail with she and her  of approximately 45 minutes therapy for 2020.  We discussed assessing her for Xeloda toxicity, have her undergo teaching for the combination treatment, subsequently scheduling a PICC line, and to proceed to radiation therapy consultation as scheduled on February 7.  We are hopeful that we can start within the next week.   The patient went on to undergo teaching, had a PICC line placed February 11, 2020 and has been seen by radiation therapy with treatment to begin February 17, 2020.  She is seen back with her  and we discussed the overall plan in detail.    Past Surgical History:   Procedure Laterality Date   • COLONOSCOPY W/ POLYPECTOMY N/A 01/03/2020    LARGE MASS IN ANORECTAL AREA, BX: SQUAMOUS CELL CARCINOMA, 3 TUBULAR ADENOMA POLYPS IN ASCENDING, 3 TUBULAR ADENOMA POLYPS IN TRANSVERSE, MELANOSIS COLI, DR. IMTIAZ ESLLERS AT [facility]    • ENDOSCOPY N/A 01/03/2020    DR. IMTIAZ SELLERS AT [facility]   • HYSTERECTOMY N/A 1993        Current Outpatient Medications on File Prior to Visit   Medication Sig Dispense Refill   • aspirin 81 MG EC tablet Take 81 mg by mouth Daily.     • capecitabine (XELODA) 500 MG chemo tablet Take 3 tabs (1500 mg) by mouth twice per day Mon-Fri with radiation. 120 tablet 0   • capecitabine (XELODA) 500 MG chemo tablet Take 3 tablets by mouth 2 (Two) Times a Day. Take 3  tab(s) by mouth in the AM and 3 tab(s) by mouth in the PM on Monday-Friday with radiation.     • Cranberry 250 MG tablet Take  by mouth.     • lisinopril (PRINIVIL,ZESTRIL) 10 MG tablet      • Multiple Vitamins-Minerals (MULTIVITAMIN ADULT) tablet Take  by mouth.     • Multiple Vitamins-Minerals (PRESERVISION AREDS 2 PO) Take  by mouth.     • ondansetron (ZOFRAN) 8 MG tablet Take 1 tablet by mouth 3 (Three) Times a Day As Needed for Nausea or Vomiting. 30 tablet 5   • pantoprazole (PROTONIX) 40 MG EC tablet      • pravastatin (PRAVACHOL) 40 MG tablet      • vitamin C (ASCORBIC ACID) 500 MG tablet Take 500 mg by mouth Daily.     • vitamin E 400 UNIT capsule Take 400 Units by mouth Daily.     • prochlorperazine (COMPAZINE) 10 MG tablet Take 1 tablet by mouth Every 6 (Six) Hours As Needed for Nausea or Vomiting. 40 tablet 2     No current facility-administered medications on file prior to visit.         ALLERGIES:    Allergies   Allergen Reactions   • Codeine Rash   • Lipitor [Atorvastatin] Rash        Social History     Socioeconomic History   • Marital status:      Spouse name: Dimitri   • Number of children: 2   • Years of education: High school   • Highest education level: Not on file   Occupational History     Employer: RETIRED   Tobacco Use   • Smoking status: Former Smoker     Packs/day: 0.50     Years: 40.00     Pack years: 20.00     Types: Cigarettes     Start date: 10/31/2004   • Smokeless tobacco: Never Used   Substance and Sexual Activity   • Alcohol use: Not Currently     Frequency: Never   • Drug use: Never   • Sexual activity: Yes     Partners: Male     Birth control/protection: Post-menopausal, Surgical     Comment: .        Family History   Problem Relation Age of Onset   • Cancer Sister         Lung Cancer   • Lung cancer Sister    • Hypertension Brother    • Heart disease Brother    • Cancer Sister         Brain Cancer   • Brain cancer Sister    • Heart disease Father    • Cancer Sister   "  • Lung cancer Sister    • Cancer Sister    • Lung cancer Sister         Review of Systems   Constitutional: Negative for activity change, appetite change, chills, fatigue and fever.   HENT: Negative for mouth sores, nosebleeds and trouble swallowing.    Respiratory: Negative for cough and shortness of breath.    Cardiovascular: Negative for chest pain and leg swelling.   Gastrointestinal: Positive for anal bleeding, blood in stool, constipation and nausea. Negative for abdominal pain, diarrhea and vomiting.   Genitourinary: Negative for difficulty urinating.   Skin: Negative for rash.   Neurological: Negative for dizziness, weakness and numbness.   Hematological: Negative for adenopathy. Does not bruise/bleed easily.   Psychiatric/Behavioral: Negative for sleep disturbance.        Objective     Vitals:    02/24/20 1456   BP: 112/73   Pulse: 98   Resp: 12   Temp: 97.6 °F (36.4 °C)   TempSrc: Oral   SpO2: 93%   Weight: 67.3 kg (148 lb 6.4 oz)   Height: 154.9 cm (60.98\")   PainSc: 0-No pain     Current Status 2/24/2020   ECOG score 0       Physical Exam   Constitutional: She is oriented to person, place, and time. She appears well-developed and well-nourished. No distress.   HENT:   Head: Normocephalic and atraumatic.   Mouth/Throat: Oropharynx is clear and moist and mucous membranes are normal. No oropharyngeal exudate.   Eyes: Pupils are equal, round, and reactive to light.   Neck: Normal range of motion.   Cardiovascular: Normal rate, regular rhythm and normal heart sounds.   No murmur heard.  Pulmonary/Chest: Effort normal and breath sounds normal. No respiratory distress. She has no wheezes. She has no rhonchi. She has no rales.   Abdominal: Soft. Normal appearance and bowel sounds are normal. She exhibits no distension. There is no hepatosplenomegaly.   Musculoskeletal: Normal range of motion. She exhibits no edema.   Right upper extremity PICC line   Neurological: She is alert and oriented to person, place, " and time.   Skin: Skin is warm and dry. No rash noted.   Psychiatric: She has a normal mood and affect.   Vitals reviewed.        RECENT LABS:  Hematology WBC   Date Value Ref Range Status   02/24/2020 8.38 3.40 - 10.80 10*3/mm3 Final     RBC   Date Value Ref Range Status   02/24/2020 3.81 3.77 - 5.28 10*6/mm3 Final     Hemoglobin   Date Value Ref Range Status   02/24/2020 12.5 12.0 - 15.9 g/dL Final     Hematocrit   Date Value Ref Range Status   02/24/2020 36.0 34.0 - 46.6 % Final     Platelets   Date Value Ref Range Status   02/24/2020 239 140 - 450 10*3/mm3 Final        FDG PET/CT IMAGING SKULL BASE TO MID THIGH  1/30/2020    FINDINGS: The primary neoplasm is an oblong tumor mass involving the far  inferior aspect of the rectum and the pain is that measures  approximately 5 cm in greatest cephalocaudad dimension and 3.6 cm in  greatest mediolateral dimension. It demonstrates intense hypermetabolism  with a maximum measured as she 33.1 g/mL. There is no metabolic evidence  of metastatic disease within the abdomen or pelvis. Particular, no  hypermetabolic lymphadenopathy is identified. There is physiologic  distribution of the radiopharmaceutical within the neck. Imaging of the  chest shows mild hypermetabolism within a borderline enlarged bilateral  hilar and subcarinal lymph nodes. These have maximum SUV in the range of  6.0 g/mL. This would be a very usual location for isolated metastatic  disease from an anorectal neoplasm. These lymph nodes are favored to be  inflammatory in etiology.     IMPRESSION:  Intensely hypermetabolic neoplasm involving the far inferior  aspect of the rectum and essentially the entire anus. There is no  compelling metabolic evidence of metastatic disease within the neck,  chest, abdomen or pelvis.     This report was finalized on 1/31/2020 2:39 PM by Dr. Mayco Patel M.D.    Assessment/Plan       79-year-old female followed by primary care as described above with development over  the last 4 to 5 months of bright red blood per rectum that became associated with worsening constipation.  The patient symptoms, unfortunately, progressed until she had GI assessment that revealed a mass in the distal rectum/anal rectal region that was cauliflower in description with ulceration.  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  She has been advised that she has a rectal malignancy.                                              In discussing this with the patient and her family we discussed that a primary rectal squamous cell carcinomas is a rare disorder and one wonders whether this is not an anal carcinoma that has extended into the rectum.  They are, incidentally, treated like rectal adenocarcinomas with surgical resection but there are studies that use chemoradiotherapy.      This patient needs to be assessed by colorectal surgery and undergo additional staging endoscopically.  We have contacted Dr. Dara Stack's office and, as they review the record, have requested that the patient proceed with laboratory studies, PET/CT and colorectal surgery assessment.  Additional laboratory studies include a CEA of 3.07, no evidence of iron deficiency, CMP with total protein mildly elevated at 8.3 g/dL.  This did occur and the the patient's studies suggest T2 N0 M0-stage IIA disease.      After review by colorectal surgery the patient is felt best treated by chemoradiation therapy and she is seen with her  February 4, 2020 at which time we discussed how this can be accomplished.  The patient proceeded to laboratory for DPD testing which is currently pending as she is seen February 12, 2020.  We have obtained Xeloda at a 25 mg meter squared to 1500 mg p.o. twice daily during days of radiation therapy with mitomycin also planned - 10 mg meter squared to 15 mg dosing anticipated day 1.    Treatment initiated on 2/17/2020 with concurrent chemotherapy and radiation.   Patient received IV mitomycin 15 mg, as well as initiated Xeloda 1500 Mg twice daily on days of radiation    2/24/2020 patient returns for evaluation.  Overall, tolerating treatment fairly well, other than some mild nausea.  .  Zofran has not been beneficial.  Will prescribe Compazine 10 mg every 6 hours as needed for nausea.  We will flush her PICC line today and change her dressing.  Patient will return in 1 week for reevaluation.    PLAN:  1. Continue Xeloda 1500 mg p.o. twice daily on days of radiation.  2. PICC line flush today and to continue weekly.  3. Return in 1 week for MD assessment with repeat CBC and CMP.  4. Compazine to use as needed for nausea control.

## 2020-02-25 ENCOUNTER — RADIATION ONCOLOGY WEEKLY ASSESSMENT (OUTPATIENT)
Dept: RADIATION ONCOLOGY | Facility: HOSPITAL | Age: 80
End: 2020-02-25

## 2020-02-25 ENCOUNTER — TELEPHONE (OUTPATIENT)
Dept: ONCOLOGY | Facility: CLINIC | Age: 80
End: 2020-02-25

## 2020-02-25 VITALS
HEART RATE: 85 BPM | SYSTOLIC BLOOD PRESSURE: 116 MMHG | DIASTOLIC BLOOD PRESSURE: 73 MMHG | OXYGEN SATURATION: 91 % | WEIGHT: 148 LBS | BODY MASS INDEX: 27.98 KG/M2

## 2020-02-25 DIAGNOSIS — C21.0 ANAL CANCER (HCC): Primary | ICD-10-CM

## 2020-02-25 PROCEDURE — 77386 CHG INTENSITY MODULATED RADIATION TX DLVR COMPLEX: CPT | Performed by: RADIOLOGY

## 2020-02-25 PROCEDURE — 77014 CHG CT GUIDANCE RADIATION THERAPY FLDS PLACEMENT: CPT | Performed by: RADIOLOGY

## 2020-02-25 PROCEDURE — 77427 RADIATION TX MANAGEMENT X5: CPT | Performed by: RADIOLOGY

## 2020-02-25 PROCEDURE — 77386: CPT | Performed by: RADIOLOGY

## 2020-02-25 RX ORDER — PROCHLORPERAZINE MALEATE 10 MG
10 TABLET ORAL EVERY 6 HOURS PRN
Qty: 40 TABLET | Refills: 2 | Status: ON HOLD | OUTPATIENT
Start: 2020-02-25 | End: 2020-08-07

## 2020-02-25 NOTE — TELEPHONE ENCOUNTER
Pt was calling in reference to her nausea-she did not know which medication the Dr ordered or when it will be ordered.     Pt Contact:   [phone]

## 2020-02-25 NOTE — PROGRESS NOTES
Physician Weekly Management Note    Diagnosis:     Diagnosis Plan   1. Anal cancer (CMS/HCC)         RT Details: fx 7/30 pelvis and anus    Notes on Treatment course, Films, Medical progress:  Doing well so far, no diarrhea, no dysuria, no fatigue    Weekly Management:  Medication reviewed?   Yes  New medications given?   No  Problemlist reviewed?   Yes  Problem added?   No  Issues raised requiring referral to support services - task assigned to:  na    Technical aspects reviewed:  Weekly OBI approved?   Yes  Weekly port films approved?   Yes  Change requests noted on port film?   No  Patient setup and plan reviewed?   Yes    Chart Reviewed:  Continue current treatment plan?   Yes  Treatment plan change requested?   No  CBC reviewed?   Yes  Concurrent Chemo?   Yes    Objective     Toxicities:   none     Review of Systems   Constitutional: Negative.    Gastrointestinal: Positive for constipation.   Genitourinary: Negative.           Vitals:    02/25/20 1401   BP: 116/73   Pulse: 85   SpO2: 91%       Current Status 2/24/2020   ECOG score 0       Physical Exam   Constitutional: She appears well-developed and well-nourished.   Psychiatric: She has a normal mood and affect. Her behavior is normal.           Problem Summary List    Diagnosis:     Diagnosis Plan   1. Anal cancer (CMS/HCC)       Pathology:   anal    Past Medical History:   Diagnosis Date   • Anal cancer (CMS/HCC) 01/2020    SQUAMOUS CELL CARCINOMA, FOLLOWED BY DR. JOVITA BAUTISTA   • Aortic atherosclerosis (CMS/HCC)    • Arthritis    • Boil    • Chronic constipation    • Colon polyps    • Depression    • Encounter for screening mammogram for breast cancer     09/2014, 10/2014, 11/2015, 11/2016, 11/2017, 11/2018, 11/2019.   • Exudative senile macular retinal degeneration (CMS/HCC)    • GERD (gastroesophageal reflux disease)     FOLLOWED BY DR. 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         Past Surgical History:   Procedure Laterality Date   • COLONOSCOPY W/ POLYPECTOMY N/A 01/03/2020    LARGE MASS IN ANORECTAL AREA, BX: SQUAMOUS CELL CARCINOMA, 3 TUBULAR ADENOMA POLYPS IN ASCENDING, 3 TUBULAR ADENOMA POLYPS IN TRANSVERSE, MELANOSIS COLI, DR. IMTIAZ SELLERS AT [facility]    • ENDOSCOPY N/A 01/03/2020    DR. IMTIAZ SELLERS AT [facility]   • HYSTERECTOMY N/A 1993         Current Outpatient Medications on File Prior to Visit   Medication Sig Dispense Refill   • aspirin 81 MG EC tablet Take 81 mg by mouth Daily.     • capecitabine (XELODA) 500 MG chemo tablet Take 3 tabs (1500 mg) by mouth twice per day Mon-Fri with radiation. 120 tablet 0   • capecitabine (XELODA) 500 MG chemo tablet Take 3 tablets by mouth 2 (Two) Times a Day. Take 3 tab(s) by mouth in the AM and 3 tab(s) by mouth in the PM on Monday-Friday with radiation.     • Cranberry 250 MG tablet Take  by mouth.     • lisinopril (PRINIVIL,ZESTRIL) 10 MG tablet      • Multiple Vitamins-Minerals (MULTIVITAMIN ADULT) tablet Take  by mouth.     • Multiple Vitamins-Minerals (PRESERVISION AREDS 2 PO) Take  by mouth.     • ondansetron (ZOFRAN) 8 MG tablet Take 1 tablet by mouth 3 (Three) Times a Day As Needed for Nausea or Vomiting. 30 tablet 5   • pantoprazole (PROTONIX) 40 MG EC tablet      • pravastatin (PRAVACHOL) 40 MG tablet      • prochlorperazine (COMPAZINE) 10 MG tablet Take 1 tablet by mouth Every 6 (Six) Hours As Needed for Nausea or Vomiting. 40 tablet 2   • vitamin C (ASCORBIC ACID) 500 MG tablet Take 500 mg by mouth Daily.     • vitamin E 400 UNIT capsule Take 400 Units by mouth Daily.       No current facility-administered medications on file prior to visit.        Allergies   Allergen Reactions   • Codeine Rash   • Lipitor  [Atorvastatin] Rash         Referring Provider:    No referring provider defined for this encounter.    Oncologist:  No primary care provider on file.      Seen and approved by:  Mariana Beyer MD  02/25/2020

## 2020-02-26 ENCOUNTER — MEDICATION THERAPY MANAGEMENT (OUTPATIENT)
Dept: ONCOLOGY | Facility: HOSPITAL | Age: 80
End: 2020-02-26

## 2020-02-26 PROCEDURE — 77386: CPT | Performed by: RADIOLOGY

## 2020-02-26 PROCEDURE — 77386 CHG INTENSITY MODULATED RADIATION TX DLVR COMPLEX: CPT | Performed by: RADIOLOGY

## 2020-02-26 PROCEDURE — 77014 CHG CT GUIDANCE RADIATION THERAPY FLDS PLACEMENT: CPT | Performed by: RADIOLOGY

## 2020-02-26 NOTE — PROGRESS NOTES
MTM telephone encounter:  Call for side effects and adherence re xeloda    Pt states she has picked up rx for compazine and that it has relieved her nausea.  She denies diarrhea, mouth sores, redness of hands/feet, fatigue or fluid retention.      Labs reviewed        2/24/2020   WBC 3.40 - 10.80 10*3/mm3 8.38   Neutrophils Absolute 1.70 - 7.00 10*3/mm3 5.88   Hemoglobin 12.0 - 15.9 g/dL 12.5   Hematocrit 34.0 - 46.6 % 36.0   Platelets 140 - 450 10*3/mm3 239     No other questions or concerns were voiced.

## 2020-02-27 PROCEDURE — 77014 CHG CT GUIDANCE RADIATION THERAPY FLDS PLACEMENT: CPT | Performed by: RADIOLOGY

## 2020-02-27 PROCEDURE — 77386: CPT | Performed by: RADIOLOGY

## 2020-02-27 PROCEDURE — 77386 CHG INTENSITY MODULATED RADIATION TX DLVR COMPLEX: CPT | Performed by: RADIOLOGY

## 2020-02-28 PROCEDURE — 77014 CHG CT GUIDANCE RADIATION THERAPY FLDS PLACEMENT: CPT | Performed by: RADIOLOGY

## 2020-02-28 PROCEDURE — 77386 CHG INTENSITY MODULATED RADIATION TX DLVR COMPLEX: CPT | Performed by: RADIOLOGY

## 2020-02-28 PROCEDURE — 77386: CPT | Performed by: RADIOLOGY

## 2020-03-01 ENCOUNTER — APPOINTMENT (OUTPATIENT)
Dept: RADIATION ONCOLOGY | Facility: HOSPITAL | Age: 80
End: 2020-03-01

## 2020-03-02 ENCOUNTER — OFFICE VISIT (OUTPATIENT)
Dept: ONCOLOGY | Facility: CLINIC | Age: 80
End: 2020-03-02

## 2020-03-02 ENCOUNTER — LAB (OUTPATIENT)
Dept: LAB | Facility: HOSPITAL | Age: 80
End: 2020-03-02

## 2020-03-02 ENCOUNTER — INFUSION (OUTPATIENT)
Dept: ONCOLOGY | Facility: HOSPITAL | Age: 80
End: 2020-03-02

## 2020-03-02 ENCOUNTER — TELEPHONE (OUTPATIENT)
Dept: ONCOLOGY | Facility: CLINIC | Age: 80
End: 2020-03-02

## 2020-03-02 VITALS
HEIGHT: 61 IN | BODY MASS INDEX: 27.96 KG/M2 | SYSTOLIC BLOOD PRESSURE: 135 MMHG | RESPIRATION RATE: 16 BRPM | TEMPERATURE: 98.6 F | OXYGEN SATURATION: 91 % | DIASTOLIC BLOOD PRESSURE: 78 MMHG | HEART RATE: 88 BPM | WEIGHT: 148.1 LBS

## 2020-03-02 DIAGNOSIS — C21.0 ANAL CANCER (HCC): Primary | ICD-10-CM

## 2020-03-02 DIAGNOSIS — C21.0 ANAL CANCER (HCC): ICD-10-CM

## 2020-03-02 LAB
BASOPHILS # BLD AUTO: 0.03 10*3/MM3 (ref 0–0.2)
BASOPHILS NFR BLD AUTO: 0.6 % (ref 0–1.5)
DEPRECATED RDW RBC AUTO: 41.1 FL (ref 37–54)
EOSINOPHIL # BLD AUTO: 0.16 10*3/MM3 (ref 0–0.4)
EOSINOPHIL NFR BLD AUTO: 3 % (ref 0.3–6.2)
ERYTHROCYTE [DISTWIDTH] IN BLOOD BY AUTOMATED COUNT: 12.1 % (ref 12.3–15.4)
HCT VFR BLD AUTO: 33.1 % (ref 34–46.6)
HGB BLD-MCNC: 11.2 G/DL (ref 12–15.9)
IMM GRANULOCYTES # BLD AUTO: 0.01 10*3/MM3 (ref 0–0.05)
IMM GRANULOCYTES NFR BLD AUTO: 0.2 % (ref 0–0.5)
LYMPHOCYTES # BLD AUTO: 1.02 10*3/MM3 (ref 0.7–3.1)
LYMPHOCYTES NFR BLD AUTO: 19.4 % (ref 19.6–45.3)
MCH RBC QN AUTO: 32.1 PG (ref 26.6–33)
MCHC RBC AUTO-ENTMCNC: 33.8 G/DL (ref 31.5–35.7)
MCV RBC AUTO: 94.8 FL (ref 79–97)
MONOCYTES # BLD AUTO: 0.62 10*3/MM3 (ref 0.1–0.9)
MONOCYTES NFR BLD AUTO: 11.8 % (ref 5–12)
NEUTROPHILS # BLD AUTO: 3.43 10*3/MM3 (ref 1.7–7)
NEUTROPHILS NFR BLD AUTO: 65 % (ref 42.7–76)
NRBC BLD AUTO-RTO: 0.6 /100 WBC (ref 0–0.2)
PLATELET # BLD AUTO: 201 10*3/MM3 (ref 140–450)
PMV BLD AUTO: 8.7 FL (ref 6–12)
RBC # BLD AUTO: 3.49 10*6/MM3 (ref 3.77–5.28)
WBC NRBC COR # BLD: 5.27 10*3/MM3 (ref 3.4–10.8)

## 2020-03-02 PROCEDURE — 36415 COLL VENOUS BLD VENIPUNCTURE: CPT

## 2020-03-02 PROCEDURE — 77014 CHG CT GUIDANCE RADIATION THERAPY FLDS PLACEMENT: CPT | Performed by: RADIOLOGY

## 2020-03-02 PROCEDURE — 99214 OFFICE O/P EST MOD 30 MIN: CPT | Performed by: INTERNAL MEDICINE

## 2020-03-02 PROCEDURE — G0463 HOSPITAL OUTPT CLINIC VISIT: HCPCS

## 2020-03-02 PROCEDURE — 96523 IRRIG DRUG DELIVERY DEVICE: CPT

## 2020-03-02 PROCEDURE — 77386 CHG INTENSITY MODULATED RADIATION TX DLVR COMPLEX: CPT | Performed by: RADIOLOGY

## 2020-03-02 PROCEDURE — 77336 RADIATION PHYSICS CONSULT: CPT | Performed by: RADIOLOGY

## 2020-03-02 PROCEDURE — 77386: CPT | Performed by: RADIOLOGY

## 2020-03-02 PROCEDURE — 85025 COMPLETE CBC W/AUTO DIFF WBC: CPT

## 2020-03-02 RX ORDER — DIPHENOXYLATE HYDROCHLORIDE AND ATROPINE SULFATE 2.5; .025 MG/1; MG/1
2 TABLET ORAL 2 TIMES DAILY
Qty: 120 TABLET | Refills: 0 | Status: SHIPPED | OUTPATIENT
Start: 2020-03-02 | End: 2020-04-01

## 2020-03-02 NOTE — TELEPHONE ENCOUNTER
Pharmacy calling about patients lomotil directions. Went over with them. No other questions or concerns.

## 2020-03-03 ENCOUNTER — MEDICATION THERAPY MANAGEMENT (OUTPATIENT)
Dept: ONCOLOGY | Facility: HOSPITAL | Age: 80
End: 2020-03-03

## 2020-03-03 PROCEDURE — 77386 CHG INTENSITY MODULATED RADIATION TX DLVR COMPLEX: CPT | Performed by: RADIOLOGY

## 2020-03-03 PROCEDURE — 77386: CPT | Performed by: RADIOLOGY

## 2020-03-03 PROCEDURE — 77014 CHG CT GUIDANCE RADIATION THERAPY FLDS PLACEMENT: CPT | Performed by: RADIOLOGY

## 2020-03-03 PROCEDURE — 77427 RADIATION TX MANAGEMENT X5: CPT | Performed by: RADIOLOGY

## 2020-03-03 NOTE — PROGRESS NOTES
MTM telephone encounter re adherence and side effects ( xeloda)    Labs reviewed        3/2/2020   WBC 3.40 - 10.80 10*3/mm3 5.27   Neutrophils Absolute 1.70 - 7.00 10*3/mm3 3.43   Hemoglobin 12.0 - 15.9 g/dL 11.2 (A)   Hematocrit 34.0 - 46.6 % 33.1 (A)   Platelets 140 - 450 10*3/mm3 201     No answer on phone

## 2020-03-04 ENCOUNTER — MEDICATION THERAPY MANAGEMENT (OUTPATIENT)
Dept: ONCOLOGY | Facility: HOSPITAL | Age: 80
End: 2020-03-04

## 2020-03-04 ENCOUNTER — RADIATION ONCOLOGY WEEKLY ASSESSMENT (OUTPATIENT)
Dept: RADIATION ONCOLOGY | Facility: HOSPITAL | Age: 80
End: 2020-03-04

## 2020-03-04 VITALS
BODY MASS INDEX: 28.36 KG/M2 | OXYGEN SATURATION: 96 % | DIASTOLIC BLOOD PRESSURE: 78 MMHG | WEIGHT: 150 LBS | SYSTOLIC BLOOD PRESSURE: 122 MMHG | HEART RATE: 92 BPM

## 2020-03-04 DIAGNOSIS — C21.0 ANAL CANCER (HCC): Primary | ICD-10-CM

## 2020-03-04 PROCEDURE — 77338 DESIGN MLC DEVICE FOR IMRT: CPT | Performed by: RADIOLOGY

## 2020-03-04 PROCEDURE — 77386 CHG INTENSITY MODULATED RADIATION TX DLVR COMPLEX: CPT | Performed by: RADIOLOGY

## 2020-03-04 PROCEDURE — 77014 CHG CT GUIDANCE RADIATION THERAPY FLDS PLACEMENT: CPT | Performed by: RADIOLOGY

## 2020-03-04 PROCEDURE — 77386: CPT | Performed by: RADIOLOGY

## 2020-03-04 NOTE — PROGRESS NOTES
Physician Weekly Management Note    Diagnosis:     Diagnosis Plan   1. Anal cancer (CMS/HCC)         RT Details:  fx 13/30 pelvis/anus    Notes on Treatment course, Films, Medical progress:  Doing fairly well, some vaginal/rectal discomfort but controlled with meds, minimal diarrhea, cont on    Weekly Management:  Medication reviewed?   Yes  New medications given?   No  Problemlist reviewed?   Yes  Problem added?   No  Issues raised requiring referral to support services - task assigned to:  na    Technical aspects reviewed:  Weekly OBI approved?   Yes  Weekly port films approved?   Yes  Change requests noted on port film?   No  Patient setup and plan reviewed?   Yes    Chart Reviewed:  Continue current treatment plan?   Yes  Treatment plan change requested?   No  CBC reviewed?   Yes  Concurrent Chemo?   Yes    Objective     Toxicities:   Erythema, fatigue     Review of Systems   Constitutional: Negative.    HENT: Negative.    Gastrointestinal: Positive for rectal pain.   Genitourinary: Positive for urgency.          There were no vitals filed for this visit.    Current Status 3/2/2020   ECOG score 0       Physical Exam   Constitutional: She appears well-developed and well-nourished.           Problem Summary List    Diagnosis:     Diagnosis Plan   1. Anal cancer (CMS/HCC)       Pathology:   Anal scc    Past Medical History:   Diagnosis Date   • Anal cancer (CMS/HCC) 01/2020    SQUAMOUS CELL CARCINOMA, FOLLOWED BY DR. JOVITA BAUTISTA   • Aortic atherosclerosis (CMS/Formerly [facility])    • Arthritis    • Boil    • Chronic constipation    • Colon polyps    • Depression    • Encounter for screening mammogram for breast cancer     09/2014, 10/2014, 11/2015, 11/2016, 11/2017, 11/2018, 11/2019.   • Exudative senile macular retinal degeneration (CMS/Formerly [facility])    • GERD (gastroesophageal reflux disease)     FOLLOWED BY DR. 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         Past Surgical History:   Procedure Laterality Date   • COLONOSCOPY W/ POLYPECTOMY N/A 01/03/2020    LARGE MASS IN ANORECTAL AREA, BX: SQUAMOUS CELL CARCINOMA, 3 TUBULAR ADENOMA POLYPS IN ASCENDING, 3 TUBULAR ADENOMA POLYPS IN TRANSVERSE, MELANOSIS COLI, DR. IMTIAZ SELLERS AT [facility]    • ENDOSCOPY N/A 01/03/2020    DR. IMTIAZ SELLERS AT [facility]   • HYSTERECTOMY N/A 1993         Current Outpatient Medications on File Prior to Visit   Medication Sig Dispense Refill   • aspirin 81 MG EC tablet Take 81 mg by mouth Daily.     • capecitabine (XELODA) 500 MG chemo tablet Take 3 tablets by mouth 2 (Two) Times a Day. Take 3 tab(s) by mouth in the AM and 3 tab(s) by mouth in the PM on Monday-Friday with radiation.     • Cranberry 250 MG tablet Take  by mouth.     • diphenoxylate-atropine (LOMOTIL) 2.5-0.025 MG per tablet Take 2 tablets by mouth 2 (Two) Times a Day for 30 days. 2 tablets four times a day  tablet 0   • lisinopril (PRINIVIL,ZESTRIL) 10 MG tablet      • Multiple Vitamins-Minerals (MULTIVITAMIN ADULT) tablet Take  by mouth.     • Multiple Vitamins-Minerals (PRESERVISION AREDS 2 PO) Take  by mouth.     • ondansetron (ZOFRAN) 8 MG tablet Take 1 tablet by mouth 3 (Three) Times a Day As Needed for Nausea or Vomiting. 30 tablet 5   • pantoprazole (PROTONIX) 40 MG EC tablet      • pravastatin (PRAVACHOL) 40 MG tablet      • prochlorperazine (COMPAZINE) 10 MG tablet Take 1 tablet by mouth Every 6 (Six) Hours As Needed for Nausea or Vomiting. 40 tablet 2   • vitamin C (ASCORBIC ACID) 500 MG tablet Take 500 mg by mouth Daily.     • vitamin E 400 UNIT capsule Take 400 Units by mouth Daily.       No current facility-administered medications on file prior to visit.        Allergies   Allergen  Reactions   • Codeine Rash   • Lipitor [Atorvastatin] Rash         Referring Provider:    No referring provider defined for this encounter.    Oncologist:  No primary care provider on file.      Seen and approved by:  Mariana Beyer MD  03/04/2020

## 2020-03-05 ENCOUNTER — SPECIALTY PHARMACY (OUTPATIENT)
Dept: PHARMACY | Facility: HOSPITAL | Age: 80
End: 2020-03-05

## 2020-03-05 ENCOUNTER — MEDICATION THERAPY MANAGEMENT (OUTPATIENT)
Dept: ONCOLOGY | Facility: HOSPITAL | Age: 80
End: 2020-03-05

## 2020-03-05 DIAGNOSIS — C21.0 ANAL CANCER (HCC): ICD-10-CM

## 2020-03-05 PROCEDURE — 77338 DESIGN MLC DEVICE FOR IMRT: CPT | Performed by: RADIOLOGY

## 2020-03-05 PROCEDURE — 77014 CHG CT GUIDANCE RADIATION THERAPY FLDS PLACEMENT: CPT | Performed by: RADIOLOGY

## 2020-03-05 PROCEDURE — 77386: CPT | Performed by: RADIOLOGY

## 2020-03-05 PROCEDURE — 77386 CHG INTENSITY MODULATED RADIATION TX DLVR COMPLEX: CPT | Performed by: RADIOLOGY

## 2020-03-05 RX ORDER — CAPECITABINE 500 MG/1
TABLET, FILM COATED ORAL
Qty: 60 TABLET | Refills: 0 | Status: ON HOLD | OUTPATIENT
Start: 2020-03-05 | End: 2020-08-07

## 2020-03-05 NOTE — TELEPHONE ENCOUNTER
I have been notified by Keegan, Pharmacist at [facility] that pt is needing an additional 2 weeks of Xeloda to finish Radiation therapy. I have escribed a new rx for the remaining 2 weeks.

## 2020-03-05 NOTE — PROGRESS NOTES
MTM telephone encounter re adherence and side effects ( Capecitabine)    No answer.  Provided direct line as [phone] if needed.

## 2020-03-06 PROCEDURE — 77386 CHG INTENSITY MODULATED RADIATION TX DLVR COMPLEX: CPT | Performed by: RADIOLOGY

## 2020-03-06 PROCEDURE — 77386: CPT | Performed by: RADIOLOGY

## 2020-03-06 PROCEDURE — 77014 CHG CT GUIDANCE RADIATION THERAPY FLDS PLACEMENT: CPT | Performed by: RADIOLOGY

## 2020-03-09 ENCOUNTER — MEDICATION THERAPY MANAGEMENT (OUTPATIENT)
Dept: ONCOLOGY | Facility: HOSPITAL | Age: 80
End: 2020-03-09

## 2020-03-09 PROCEDURE — 77336 RADIATION PHYSICS CONSULT: CPT | Performed by: RADIOLOGY

## 2020-03-09 PROCEDURE — 77386 CHG INTENSITY MODULATED RADIATION TX DLVR COMPLEX: CPT | Performed by: RADIOLOGY

## 2020-03-09 PROCEDURE — 77014 CHG CT GUIDANCE RADIATION THERAPY FLDS PLACEMENT: CPT | Performed by: RADIOLOGY

## 2020-03-09 PROCEDURE — 77386: CPT | Performed by: RADIOLOGY

## 2020-03-09 NOTE — PROGRESS NOTES
"MTM telephone encounter re adherence and side effects ( Capecitabine)    Pt states \"I am sore.\"  She is putting Aquaphor on the anal radiation area and states it has helped give her some relief. She tried Aloe Vera, but it made the area burn.   She denies diarrhea.  She states  prochlorperazine is working well to control nausea. She also states she is very fatigued and mostly just resting at home.   She denies any symptoms of hand foot syndrome, mouth sores, or diarrhea.  She is aware of her lab and ARNP appointment with Leticia Sofia tomorrow.  No further questions or concerns were voiced.    "

## 2020-03-10 ENCOUNTER — LAB (OUTPATIENT)
Dept: LAB | Facility: HOSPITAL | Age: 80
End: 2020-03-10

## 2020-03-10 ENCOUNTER — OFFICE VISIT (OUTPATIENT)
Dept: ONCOLOGY | Facility: CLINIC | Age: 80
End: 2020-03-10

## 2020-03-10 ENCOUNTER — INFUSION (OUTPATIENT)
Dept: ONCOLOGY | Facility: HOSPITAL | Age: 80
End: 2020-03-10

## 2020-03-10 VITALS
HEART RATE: 89 BPM | WEIGHT: 151 LBS | DIASTOLIC BLOOD PRESSURE: 85 MMHG | OXYGEN SATURATION: 94 % | SYSTOLIC BLOOD PRESSURE: 149 MMHG | BODY MASS INDEX: 28.51 KG/M2 | TEMPERATURE: 98.6 F | HEIGHT: 61 IN | RESPIRATION RATE: 18 BRPM

## 2020-03-10 DIAGNOSIS — G89.3 CANCER ASSOCIATED PAIN: ICD-10-CM

## 2020-03-10 DIAGNOSIS — C21.0 ANAL CANCER (HCC): Primary | ICD-10-CM

## 2020-03-10 DIAGNOSIS — C21.0 ANAL CANCER (HCC): ICD-10-CM

## 2020-03-10 LAB
BASOPHILS # BLD AUTO: 0.03 10*3/MM3 (ref 0–0.2)
BASOPHILS NFR BLD AUTO: 0.6 % (ref 0–1.5)
DEPRECATED RDW RBC AUTO: 41.9 FL (ref 37–54)
EOSINOPHIL # BLD AUTO: 0.15 10*3/MM3 (ref 0–0.4)
EOSINOPHIL NFR BLD AUTO: 2.8 % (ref 0.3–6.2)
ERYTHROCYTE [DISTWIDTH] IN BLOOD BY AUTOMATED COUNT: 13.7 % (ref 12.3–15.4)
HCT VFR BLD AUTO: 33 % (ref 34–46.6)
HGB BLD-MCNC: 11.1 G/DL (ref 12–15.9)
IMM GRANULOCYTES # BLD AUTO: 0.07 10*3/MM3 (ref 0–0.05)
IMM GRANULOCYTES NFR BLD AUTO: 1.3 % (ref 0–0.5)
LYMPHOCYTES # BLD AUTO: 0.72 10*3/MM3 (ref 0.7–3.1)
LYMPHOCYTES NFR BLD AUTO: 13.3 % (ref 19.6–45.3)
MCH RBC QN AUTO: 32.5 PG (ref 26.6–33)
MCHC RBC AUTO-ENTMCNC: 33.6 G/DL (ref 31.5–35.7)
MCV RBC AUTO: 96.5 FL (ref 79–97)
MONOCYTES # BLD AUTO: 0.76 10*3/MM3 (ref 0.1–0.9)
MONOCYTES NFR BLD AUTO: 14 % (ref 5–12)
NEUTROPHILS # BLD AUTO: 3.68 10*3/MM3 (ref 1.7–7)
NEUTROPHILS NFR BLD AUTO: 68 % (ref 42.7–76)
NRBC BLD AUTO-RTO: 0.6 /100 WBC (ref 0–0.2)
PLATELET # BLD AUTO: 153 10*3/MM3 (ref 140–450)
PMV BLD AUTO: 10.1 FL (ref 6–12)
RBC # BLD AUTO: 3.42 10*6/MM3 (ref 3.77–5.28)
WBC NRBC COR # BLD: 5.41 10*3/MM3 (ref 3.4–10.8)

## 2020-03-10 PROCEDURE — 77386: CPT | Performed by: RADIOLOGY

## 2020-03-10 PROCEDURE — 99214 OFFICE O/P EST MOD 30 MIN: CPT | Performed by: NURSE PRACTITIONER

## 2020-03-10 PROCEDURE — 77427 RADIATION TX MANAGEMENT X5: CPT | Performed by: RADIOLOGY

## 2020-03-10 PROCEDURE — 85025 COMPLETE CBC W/AUTO DIFF WBC: CPT

## 2020-03-10 PROCEDURE — G0463 HOSPITAL OUTPT CLINIC VISIT: HCPCS

## 2020-03-10 PROCEDURE — 77014 CHG CT GUIDANCE RADIATION THERAPY FLDS PLACEMENT: CPT | Performed by: RADIOLOGY

## 2020-03-10 PROCEDURE — 77336 RADIATION PHYSICS CONSULT: CPT | Performed by: RADIOLOGY

## 2020-03-10 PROCEDURE — 36415 COLL VENOUS BLD VENIPUNCTURE: CPT

## 2020-03-10 PROCEDURE — 77386 CHG INTENSITY MODULATED RADIATION TX DLVR COMPLEX: CPT | Performed by: RADIOLOGY

## 2020-03-10 RX ORDER — TRAMADOL HYDROCHLORIDE 50 MG/1
50 TABLET ORAL EVERY 6 HOURS PRN
Qty: 40 TABLET | Refills: 0 | Status: ON HOLD | OUTPATIENT
Start: 2020-03-10 | End: 2020-08-07

## 2020-03-10 RX ORDER — TRAMADOL HYDROCHLORIDE 50 MG/1
50 TABLET ORAL EVERY 6 HOURS PRN
Qty: 40 TABLET | Refills: 0 | Status: CANCELLED | OUTPATIENT
Start: 2020-03-10

## 2020-03-10 NOTE — PROGRESS NOTES
Subjective  rectal/ anal pain, picc line dressing change    REASON FOR CONSULTATION: Anal carcinoma     REQUESTING PHYSICIAN: Rosa Christianson MD, Imtiaz Sellers MD    History of Present Illness    Patient is a 79-year-old female with the above-mentioned history who is here today for toxicity check.  She received her first dose of mitomycin on 2/27/2017.  She also initiated concurrent radiation and Xeloda that day.  She is taking Xeloda 1500 mg twice daily on the days of radiation.      She is starting to have some rectal/anal discomfort, particularly when she has a bowel movement.  She is trying to do sitz bath's and using Aquaphor.  She does not have pain medication.  Otherwise, she is tolerating the Xeloda fairly well, without any mouth sores, or skin irritation.      Past Medical History:   Diagnosis Date   • Anal cancer (CMS/HCC) 01/2020    SQUAMOUS CELL CARCINOMA, FOLLOWED BY DR. JOVITA BAUTISTA   • Aortic atherosclerosis (CMS/HCC)    • Arthritis    • Boil    • Chronic constipation    • Colon polyps    • Depression    • Encounter for screening mammogram for breast cancer     09/2014, 10/2014, 11/2015, 11/2016, 11/2017, 11/2018, 11/2019.   • Exudative senile macular retinal degeneration (CMS/HCC)    • GERD (gastroesophageal reflux disease)     FOLLOWED BY DR. 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       Hematologic/oncologic history: The patient is a 79-year-old female followed (according to record) by primary care with depression, pulmonary hypertension, macular degeneration and retinal degeneration as well as aortic atherosclerosis.  She was seen December 10, 2019 by primary care plans to continue aspirin, lisinopril, pantoprazole and  pravastatin.She had been noting bright red blood per rectum since the fall, 2019 with the presumption that she had hemorrhoidal bleeding.  She also indicates that she has been chronically constipated now requiring a laxative on a regular basis.  She has been tested previously with hemoccult but requested that this was not going to be helpful with her symptoms.  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     Review of the procedure note by Dr. Zen Franco January 3, 2020 indicates that there is a large mass at the anorectal area likely in the distal rectum, cauliflower with ulceration multiple biopsies taken. She has not been evaluated by colorectal surgery thus far.  The patient requested assessment and [facility] and is seen with her  and granddaughter.  She has not had weight loss, night sweats, fever, chills though has recognized change in bowel function as described above with bright red blood per rectum for several months.     The patient was assessed January 27 and felt to have an apparent anal carcinoma that extended into the rectum.  As result she was asked to undergo PET/CT scanning and assessment by colorectal surgery.  PET/CT demonstrated the primary tumor as an oblong tumor involving the 4 inferior SPECT SPECT the rectum measuring 5 cm x 3.6 cm with intense hypermetabolism at 33.1.  There is no evidence of disease within the abdomen pelvis with mild hypermetabolism with borderline enlarged bilateral hilar and subcarinal lymph nodes with SUV up to 6.0.  These are suspected to be inflammatory in nature.  The patient was seen by colorectal surgery January 31 and felt to have an anal squamous cell  carcinoma and referred for chemoradiation therapy as well as GYN for evaluation continue risk of HPV related malignancies.  The patient is now scheduled to be seen by radiation therapy on February 7, 2001.     The patient is seen back February 4, 2020 and we have again discussed concurrent chemoradiotherapy rather than surgery using concurrent 5-FU (in this case capecitabine)and mitomycin.  This is a combination of 10 mg/m²-maximum 15 mg IV of mitomycin given through a free-flowing line, capecitabine is 825 mg meter squared on radiation days through the completion of radiation therapy.  This is been discussed in detail with she and her  of approximately 45 minutes therapy for 2020.  We discussed assessing her for Xeloda toxicity, have her undergo teaching for the combination treatment, subsequently scheduling a PICC line, and to proceed to radiation therapy consultation as scheduled on February 7.  We are hopeful that we can start within the next week.   The patient went on to undergo teaching, had a PICC line placed February 11, 2020 and has been seen by radiation therapy with treatment to begin February 17, 2020.  She is seen back with her  and we discussed the overall plan in detail.    Past Surgical History:   Procedure Laterality Date   • COLONOSCOPY W/ POLYPECTOMY N/A 01/03/2020    LARGE MASS IN ANORECTAL AREA, BX: SQUAMOUS CELL CARCINOMA, 3 TUBULAR ADENOMA POLYPS IN ASCENDING, 3 TUBULAR ADENOMA POLYPS IN TRANSVERSE, MELANOSIS COLI, DR. IMTIAZ SELLERS AT [facility]    • ENDOSCOPY N/A 01/03/2020    DR. IMTIAZ SELLERS AT [facility]   • HYSTERECTOMY N/A 1993        Current Outpatient Medications on File Prior to Visit   Medication Sig Dispense Refill   • aspirin 81 MG EC tablet Take 81 mg by mouth Daily.     • capecitabine (XELODA) 500 MG chemo tablet Take 3 tablets by mouth in the morning and 3 tablets by mouth in the evening on Monday-Friday with radiation. 60 tablet 0   •  Cranberry 250 MG tablet Take  by mouth.     • diphenoxylate-atropine (LOMOTIL) 2.5-0.025 MG per tablet Take 2 tablets by mouth 2 (Two) Times a Day for 30 days. 2 tablets four times a day  tablet 0   • lisinopril (PRINIVIL,ZESTRIL) 10 MG tablet      • Multiple Vitamins-Minerals (MULTIVITAMIN ADULT) tablet Take  by mouth.     • Multiple Vitamins-Minerals (PRESERVISION AREDS 2 PO) Take  by mouth.     • ondansetron (ZOFRAN) 8 MG tablet Take 1 tablet by mouth 3 (Three) Times a Day As Needed for Nausea or Vomiting. 30 tablet 5   • pantoprazole (PROTONIX) 40 MG EC tablet      • pravastatin (PRAVACHOL) 40 MG tablet      • prochlorperazine (COMPAZINE) 10 MG tablet Take 1 tablet by mouth Every 6 (Six) Hours As Needed for Nausea or Vomiting. 40 tablet 2   • vitamin C (ASCORBIC ACID) 500 MG tablet Take 500 mg by mouth Daily.     • vitamin E 400 UNIT capsule Take 400 Units by mouth Daily.       No current facility-administered medications on file prior to visit.         ALLERGIES:    Allergies   Allergen Reactions   • Codeine Rash   • Lipitor [Atorvastatin] Rash        Social History     Socioeconomic History   • Marital status:      Spouse name: Dimitri   • Number of children: 2   • Years of education: High school   • Highest education level: Not on file   Occupational History     Employer: RETIRED   Tobacco Use   • Smoking status: Former Smoker     Packs/day: 0.50     Years: 40.00     Pack years: 20.00     Types: Cigarettes     Start date: 10/31/2004   • Smokeless tobacco: Never Used   Substance and Sexual Activity   • Alcohol use: Not Currently     Frequency: Never   • Drug use: Never   • Sexual activity: Yes     Partners: Male     Birth control/protection: Post-menopausal, Surgical     Comment: .        Family History   Problem Relation Age of Onset   • Cancer Sister         Lung Cancer   • Lung cancer Sister    • Hypertension Brother    • Heart disease Brother    • Cancer Sister         Brain Cancer   •  "Brain cancer Sister    • Heart disease Father    • Cancer Sister    • Lung cancer Sister    • Cancer Sister    • Lung cancer Sister         Review of Systems   Constitutional: Negative for activity change, appetite change, chills, fatigue and fever.   HENT: Negative for mouth sores, nosebleeds and trouble swallowing.    Respiratory: Negative for cough and shortness of breath.    Cardiovascular: Negative for chest pain and leg swelling.   Gastrointestinal: Positive for anal bleeding, blood in stool, constipation and nausea. Negative for abdominal pain, diarrhea and vomiting.   Genitourinary: Negative for difficulty urinating.   Skin: Negative for rash.   Neurological: Negative for dizziness, weakness and numbness.   Hematological: Negative for adenopathy. Does not bruise/bleed easily.   Psychiatric/Behavioral: Negative for sleep disturbance.   3/10/2020 review of systems unchanged from above except as updated.    Objective     Vitals:    03/10/20 1103   BP: 149/85   Pulse: 89   Resp: 18   Temp: 98.6 °F (37 °C)   TempSrc: Oral   SpO2: 94%   Weight: 68.5 kg (151 lb)   Height: 154.9 cm (60.98\")   PainSc: 0-No pain     Current Status 3/10/2020   ECOG score 0       Physical Exam   Constitutional: She is oriented to person, place, and time. She appears well-developed and well-nourished.   Appears uncomfortable sitting   HENT:   Head: Normocephalic and atraumatic.   Mouth/Throat: Oropharynx is clear and moist and mucous membranes are normal. No oropharyngeal exudate.   Eyes: Pupils are equal, round, and reactive to light.   Neck: Normal range of motion.   Cardiovascular: Normal rate, regular rhythm and normal heart sounds.   No murmur heard.  Pulmonary/Chest: Effort normal and breath sounds normal. No respiratory distress. She has no wheezes. She has no rhonchi. She has no rales.   Abdominal: Soft. Normal appearance and bowel sounds are normal. She exhibits no distension. There is no hepatosplenomegaly.   Musculoskeletal: " Normal range of motion. She exhibits no edema.   Right upper extremity PICC line   Neurological: She is alert and oriented to person, place, and time.   Skin: Skin is warm and dry. No rash noted.   Psychiatric: She has a normal mood and affect.   Vitals reviewed.  3/10/2020 physical exam unchanged from above.    RECENT LABS:  Hematology WBC   Date Value Ref Range Status   03/10/2020 5.41 3.40 - 10.80 10*3/mm3 Final     RBC   Date Value Ref Range Status   03/10/2020 3.42 (L) 3.77 - 5.28 10*6/mm3 Final     Hemoglobin   Date Value Ref Range Status   03/10/2020 11.1 (L) 12.0 - 15.9 g/dL Final     Hematocrit   Date Value Ref Range Status   03/10/2020 33.0 (L) 34.0 - 46.6 % Final     Platelets   Date Value Ref Range Status   03/10/2020 153 140 - 450 10*3/mm3 Final        FDG PET/CT IMAGING SKULL BASE TO MID THIGH  1/30/2020    FINDINGS: The primary neoplasm is an oblong tumor mass involving the far  inferior aspect of the rectum and the pain is that measures  approximately 5 cm in greatest cephalocaudad dimension and 3.6 cm in  greatest mediolateral dimension. It demonstrates intense hypermetabolism  with a maximum measured as she 33.1 g/mL. There is no metabolic evidence  of metastatic disease within the abdomen or pelvis. Particular, no  hypermetabolic lymphadenopathy is identified. There is physiologic  distribution of the radiopharmaceutical within the neck. Imaging of the  chest shows mild hypermetabolism within a borderline enlarged bilateral  hilar and subcarinal lymph nodes. These have maximum SUV in the range of  6.0 g/mL. This would be a very usual location for isolated metastatic  disease from an anorectal neoplasm. These lymph nodes are favored to be  inflammatory in etiology.     IMPRESSION:  Intensely hypermetabolic neoplasm involving the far inferior  aspect of the rectum and essentially the entire anus. There is no  compelling metabolic evidence of metastatic disease within the neck,  chest, abdomen or  pelvis.     This report was finalized on 1/31/2020 2:39 PM by Dr. Mayco Patel M.D.    Assessment/Plan       79-year-old female followed by primary care as described above with development over the last 4 to 5 months of bright red blood per rectum that became associated with worsening constipation.  The patient symptoms, unfortunately, progressed until she had GI assessment that revealed a mass in the distal rectum/anal rectal region that was cauliflower in description with ulceration.  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  She has been advised that she has a rectal malignancy.                                              In discussing this with the patient and her family we discussed that a primary rectal squamous cell carcinomas is a rare disorder and one wonders whether this is not an anal carcinoma that has extended into the rectum.  They are, incidentally, treated like rectal adenocarcinomas with surgical resection but there are studies that use chemoradiotherapy.      This patient needs to be assessed by colorectal surgery and undergo additional staging endoscopically.  We have contacted Dr. Dara Stack's office and, as they review the record, have requested that the patient proceed with laboratory studies, PET/CT and colorectal surgery assessment.  Additional laboratory studies include a CEA of 3.07, no evidence of iron deficiency, CMP with total protein mildly elevated at 8.3 g/dL.  This did occur and the the patient's studies suggest T2 N0 M0-stage IIA disease.      After review by colorectal surgery the patient is felt best treated by chemoradiation therapy and she is seen with her  February 4, 2020 at which time we discussed how this can be accomplished.  The patient proceeded to laboratory for DPD testing which is currently pending as she is seen February 12, 2020.  We have obtained Xeloda at a 25 mg meter squared to 1500 mg p.o. twice daily during  days of radiation therapy with mitomycin also planned - 10 mg meter squared to 15 mg dosing anticipated day 1.    Treatment initiated on 2/17/2020 with concurrent chemotherapy and radiation.  Patient received IV mitomycin 15 mg, as well as initiated Xeloda 1500 Mg twice daily on days of radiation    2/24/2020 patient returns for evaluation.  Overall, tolerating treatment fairly well, other than some mild nausea.  .  Zofran has not been beneficial.  Will prescribe Compazine 10 mg every 6 hours as needed for nausea.  We will flush her PICC line today and change her dressing.  Patient will return in 1 week for reevaluation.    3/10/2020 patient returns for evaluation, starting to have significant discomfort in her anorectal region, particularly with bowel movements and sitting.  She does not have pain medication.  She is reluctant to try pain medication, but is willing to try something particularly to help her sleep at night.  We will prescribe tramadol 1 every 6 hours as needed for pain.    PLAN:  1. Continue Xeloda 1500 mg twice daily on days of radiation.  2. Patient prescribed tramadol 50 mg every 6 hours as needed for pain.  3. PICC line will be flushed today and to continue weekly.  4. Return in 1 week for MD assessment with repeat CBC and CMP.  Continue Xeloda 1500 mg p.o. twice daily on days of radiation.  5. PICC line flush today and to continue weekly.  6. Return in 1 week for NP assessment with repeat CBC and CMP.  7. Return in 2 weeks for MD assessment with CBC and CMP.

## 2020-03-10 NOTE — PROGRESS NOTES
Per ERWIN PACHECO, pt does not need IVF's today. PICC dressing changed per protocol using sterile technique. End cap changed. Site appears WNL.

## 2020-03-11 ENCOUNTER — MEDICATION THERAPY MANAGEMENT (OUTPATIENT)
Dept: PHARMACY | Facility: HOSPITAL | Age: 80
End: 2020-03-11

## 2020-03-11 PROCEDURE — 77386: CPT | Performed by: RADIOLOGY

## 2020-03-11 PROCEDURE — 77386 CHG INTENSITY MODULATED RADIATION TX DLVR COMPLEX: CPT | Performed by: RADIOLOGY

## 2020-03-11 PROCEDURE — 77014 CHG CT GUIDANCE RADIATION THERAPY FLDS PLACEMENT: CPT | Performed by: RADIOLOGY

## 2020-03-11 NOTE — PROGRESS NOTES
[facility] lab review        3/10/2020   WBC 3.40 - 10.80 10*3/mm3 5.41   Neutrophils Absolute 1.70 - 7.00 10*3/mm3 3.68   Hemoglobin 12.0 - 15.9 g/dL 11.1 (A)   Hematocrit 34.0 - 46.6 % 33.0 (A)   Platelets 140 - 450 10*3/mm3 153     Pt had ARNP visit today and tramadol was aded for anorectal discomfort.

## 2020-03-12 ENCOUNTER — RADIATION ONCOLOGY WEEKLY ASSESSMENT (OUTPATIENT)
Dept: RADIATION ONCOLOGY | Facility: HOSPITAL | Age: 80
End: 2020-03-12

## 2020-03-12 DIAGNOSIS — C21.0 ANAL CANCER (HCC): Primary | ICD-10-CM

## 2020-03-12 PROCEDURE — 77014 CHG CT GUIDANCE RADIATION THERAPY FLDS PLACEMENT: CPT | Performed by: RADIOLOGY

## 2020-03-12 PROCEDURE — 77386: CPT | Performed by: RADIOLOGY

## 2020-03-12 PROCEDURE — 77386 CHG INTENSITY MODULATED RADIATION TX DLVR COMPLEX: CPT | Performed by: RADIOLOGY

## 2020-03-12 NOTE — PROGRESS NOTES
f  Physician Weekly Management Note    Diagnosis:     Diagnosis Plan   1. Anal cancer (CMS/HCC)         RT Details:  fx 19/30 pelvis/anus    Notes on Treatment course, Films, Medical progress:  Doing ok, moderate erythema over perianal skin and groin    Weekly Management:  Medication reviewed?   Yes  New medications given?   No  Problemlist reviewed?   Yes  Problem added?   No  Issues raised requiring referral to support services - task assigned to:  na    Technical aspects reviewed:  Weekly OBI approved?   Yes  Weekly port films approved?   Yes  Change requests noted on port film?   No  Patient setup and plan reviewed?   Yes    Chart Reviewed:  Continue current treatment plan?   Yes  Treatment plan change requested?   No  CBC reviewed?   Yes  Concurrent Chemo?   Yes    Objective     Toxicities:   Fatigue, erythema     Review of Systems   Constitutional: Positive for fatigue.   Gastrointestinal: Positive for rectal pain. Negative for diarrhea and nausea.   Genitourinary: Negative.           There were no vitals filed for this visit.    Current Status 3/10/2020   ECOG score 0       Physical Exam   Constitutional: She appears well-developed and well-nourished.   Genitourinary:   Genitourinary Comments: Moderate erythema over perianal skin and groin           Problem Summary List    Diagnosis:     Diagnosis Plan   1. Anal cancer (CMS/HCC)       Pathology:   Anal squamous cell carcinoma    Past Medical History:   Diagnosis Date   • Anal cancer (CMS/[facility]) 01/2020    SQUAMOUS CELL CARCINOMA, FOLLOWED BY DR. JOVITA BAUTISTA   • Aortic atherosclerosis (CMS/[facility])    • Arthritis    • Boil    • Chronic constipation    • Colon polyps    • Depression    • Encounter for screening mammogram for breast cancer     09/2014, 10/2014, 11/2015, 11/2016, 11/2017, 11/2018, 11/2019.   • Exudative senile macular retinal degeneration (CMS/[facility])    • GERD (gastroesophageal reflux disease)     FOLLOWED BY DR. 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         Past Surgical History:   Procedure Laterality Date   • COLONOSCOPY W/ POLYPECTOMY N/A 01/03/2020    LARGE MASS IN ANORECTAL AREA, BX: SQUAMOUS CELL CARCINOMA, 3 TUBULAR ADENOMA POLYPS IN ASCENDING, 3 TUBULAR ADENOMA POLYPS IN TRANSVERSE, MELANOSIS COLI, DR. IMTIAZ SELLERS AT [facility]    • ENDOSCOPY N/A 01/03/2020    DR. IMTIAZ SELLERS AT [facility]   • HYSTERECTOMY N/A 1993         Current Outpatient Medications on File Prior to Visit   Medication Sig Dispense Refill   • aspirin 81 MG EC tablet Take 81 mg by mouth Daily.     • capecitabine (XELODA) 500 MG chemo tablet Take 3 tablets by mouth in the morning and 3 tablets by mouth in the evening on Monday-Friday with radiation. 60 tablet 0   • Cranberry 250 MG tablet Take  by mouth.     • diphenoxylate-atropine (LOMOTIL) 2.5-0.025 MG per tablet Take 2 tablets by mouth 2 (Two) Times a Day for 30 days. 2 tablets four times a day  tablet 0   • lisinopril (PRINIVIL,ZESTRIL) 10 MG tablet      • Multiple Vitamins-Minerals (MULTIVITAMIN ADULT) tablet Take  by mouth.     • Multiple Vitamins-Minerals (PRESERVISION AREDS 2 PO) Take  by mouth.     • ondansetron (ZOFRAN) 8 MG tablet Take 1 tablet by mouth 3 (Three) Times a Day As Needed for Nausea or Vomiting. 30 tablet 5   • pantoprazole (PROTONIX) 40 MG EC tablet      • pravastatin (PRAVACHOL) 40 MG tablet      • prochlorperazine (COMPAZINE) 10 MG tablet Take 1 tablet by mouth Every 6 (Six) Hours As Needed for Nausea or Vomiting. 40 tablet 2   • traMADol (ULTRAM) 50 MG tablet Take 1 tablet by mouth Every 6 (Six) Hours As Needed for Moderate Pain . 40 tablet 0   • vitamin C (ASCORBIC ACID) 500 MG tablet Take 500 mg by  mouth Daily.     • vitamin E 400 UNIT capsule Take 400 Units by mouth Daily.       No current facility-administered medications on file prior to visit.        Allergies   Allergen Reactions   • Codeine Rash   • Lipitor [Atorvastatin] Rash         Referring Provider:    No referring provider defined for this encounter.    Oncologist:  No primary care provider on file.      Seen and approved by:  Mariana Beyer MD  03/12/2020

## 2020-03-13 PROCEDURE — 77014 CHG CT GUIDANCE RADIATION THERAPY FLDS PLACEMENT: CPT | Performed by: RADIOLOGY

## 2020-03-13 PROCEDURE — 77386 CHG INTENSITY MODULATED RADIATION TX DLVR COMPLEX: CPT | Performed by: RADIOLOGY

## 2020-03-13 PROCEDURE — 77386: CPT | Performed by: RADIOLOGY

## 2020-03-16 ENCOUNTER — APPOINTMENT (OUTPATIENT)
Dept: LAB | Facility: HOSPITAL | Age: 80
End: 2020-03-16

## 2020-03-16 ENCOUNTER — MEDICATION THERAPY MANAGEMENT (OUTPATIENT)
Dept: PHARMACY | Facility: HOSPITAL | Age: 80
End: 2020-03-16

## 2020-03-16 ENCOUNTER — APPOINTMENT (OUTPATIENT)
Dept: ONCOLOGY | Facility: HOSPITAL | Age: 80
End: 2020-03-16

## 2020-03-16 PROCEDURE — 77386 CHG INTENSITY MODULATED RADIATION TX DLVR COMPLEX: CPT | Performed by: RADIOLOGY

## 2020-03-16 PROCEDURE — 77386: CPT | Performed by: RADIOLOGY

## 2020-03-16 PROCEDURE — 77014 CHG CT GUIDANCE RADIATION THERAPY FLDS PLACEMENT: CPT | Performed by: RADIOLOGY

## 2020-03-16 NOTE — PROGRESS NOTES
MTM telephone encounter re side effects and adherence for Capecitabine    No answer of telephone.  Direct line [phone] left on VM.

## 2020-03-17 ENCOUNTER — MEDICATION THERAPY MANAGEMENT (OUTPATIENT)
Dept: PHARMACY | Facility: HOSPITAL | Age: 80
End: 2020-03-17

## 2020-03-17 ENCOUNTER — LAB (OUTPATIENT)
Dept: ONCOLOGY | Facility: HOSPITAL | Age: 80
End: 2020-03-17

## 2020-03-17 ENCOUNTER — INFUSION (OUTPATIENT)
Dept: ONCOLOGY | Facility: HOSPITAL | Age: 80
End: 2020-03-17

## 2020-03-17 ENCOUNTER — OFFICE VISIT (OUTPATIENT)
Dept: ONCOLOGY | Facility: CLINIC | Age: 80
End: 2020-03-17

## 2020-03-17 VITALS
TEMPERATURE: 98.3 F | WEIGHT: 147.2 LBS | HEART RATE: 76 BPM | RESPIRATION RATE: 16 BRPM | OXYGEN SATURATION: 91 % | SYSTOLIC BLOOD PRESSURE: 126 MMHG | HEIGHT: 61 IN | BODY MASS INDEX: 27.79 KG/M2 | DIASTOLIC BLOOD PRESSURE: 72 MMHG

## 2020-03-17 DIAGNOSIS — C21.0 ANAL CANCER (HCC): ICD-10-CM

## 2020-03-17 DIAGNOSIS — C21.0 ANAL CANCER (HCC): Primary | ICD-10-CM

## 2020-03-17 LAB
ALBUMIN SERPL-MCNC: 3.7 G/DL (ref 3.5–5.2)
ALBUMIN/GLOB SERPL: 1.1 G/DL (ref 1.1–2.4)
ALP SERPL-CCNC: 91 U/L (ref 38–116)
ALT SERPL W P-5'-P-CCNC: 13 U/L (ref 0–33)
ANION GAP SERPL CALCULATED.3IONS-SCNC: 13.8 MMOL/L (ref 5–15)
AST SERPL-CCNC: 20 U/L (ref 0–32)
BASOPHILS # BLD AUTO: 0.03 10*3/MM3 (ref 0–0.2)
BASOPHILS NFR BLD AUTO: 0.7 % (ref 0–1.5)
BILIRUB SERPL-MCNC: 0.4 MG/DL (ref 0.2–1.2)
BUN BLD-MCNC: 12 MG/DL (ref 6–20)
BUN/CREAT SERPL: 15.6 (ref 7.3–30)
CALCIUM SPEC-SCNC: 8.8 MG/DL (ref 8.5–10.2)
CHLORIDE SERPL-SCNC: 104 MMOL/L (ref 98–107)
CO2 SERPL-SCNC: 24.2 MMOL/L (ref 22–29)
CREAT BLD-MCNC: 0.77 MG/DL (ref 0.6–1.1)
DEPRECATED RDW RBC AUTO: 44.6 FL (ref 37–54)
EOSINOPHIL # BLD AUTO: 0.13 10*3/MM3 (ref 0–0.4)
EOSINOPHIL NFR BLD AUTO: 3.1 % (ref 0.3–6.2)
ERYTHROCYTE [DISTWIDTH] IN BLOOD BY AUTOMATED COUNT: 14.9 % (ref 12.3–15.4)
GFR SERPL CREATININE-BSD FRML MDRD: 73 ML/MIN/1.73
GLOBULIN UR ELPH-MCNC: 3.3 GM/DL (ref 1.8–3.5)
GLUCOSE BLD-MCNC: 105 MG/DL (ref 74–124)
HCT VFR BLD AUTO: 32.4 % (ref 34–46.6)
HGB BLD-MCNC: 10.5 G/DL (ref 12–15.9)
IMM GRANULOCYTES # BLD AUTO: 0.01 10*3/MM3 (ref 0–0.05)
IMM GRANULOCYTES NFR BLD AUTO: 0.2 % (ref 0–0.5)
LYMPHOCYTES # BLD AUTO: 0.48 10*3/MM3 (ref 0.7–3.1)
LYMPHOCYTES NFR BLD AUTO: 11.6 % (ref 19.6–45.3)
MCH RBC QN AUTO: 32.9 PG (ref 26.6–33)
MCHC RBC AUTO-ENTMCNC: 32.4 G/DL (ref 31.5–35.7)
MCV RBC AUTO: 101.6 FL (ref 79–97)
MONOCYTES # BLD AUTO: 0.44 10*3/MM3 (ref 0.1–0.9)
MONOCYTES NFR BLD AUTO: 10.6 % (ref 5–12)
NEUTROPHILS # BLD AUTO: 3.05 10*3/MM3 (ref 1.7–7)
NEUTROPHILS NFR BLD AUTO: 73.8 % (ref 42.7–76)
NRBC BLD AUTO-RTO: 1 /100 WBC (ref 0–0.2)
PLATELET # BLD AUTO: 155 10*3/MM3 (ref 140–450)
PMV BLD AUTO: 9.2 FL (ref 6–12)
POTASSIUM BLD-SCNC: 3.6 MMOL/L (ref 3.5–4.7)
PROT SERPL-MCNC: 7 G/DL (ref 6.3–8)
RBC # BLD AUTO: 3.19 10*6/MM3 (ref 3.77–5.28)
SODIUM BLD-SCNC: 142 MMOL/L (ref 134–145)
WBC NRBC COR # BLD: 4.14 10*3/MM3 (ref 3.4–10.8)

## 2020-03-17 PROCEDURE — 80053 COMPREHEN METABOLIC PANEL: CPT

## 2020-03-17 PROCEDURE — 77386: CPT | Performed by: RADIOLOGY

## 2020-03-17 PROCEDURE — G0463 HOSPITAL OUTPT CLINIC VISIT: HCPCS

## 2020-03-17 PROCEDURE — 36415 COLL VENOUS BLD VENIPUNCTURE: CPT

## 2020-03-17 PROCEDURE — 77014 CHG CT GUIDANCE RADIATION THERAPY FLDS PLACEMENT: CPT | Performed by: RADIOLOGY

## 2020-03-17 PROCEDURE — 77427 RADIATION TX MANAGEMENT X5: CPT | Performed by: RADIOLOGY

## 2020-03-17 PROCEDURE — 99214 OFFICE O/P EST MOD 30 MIN: CPT | Performed by: NURSE PRACTITIONER

## 2020-03-17 PROCEDURE — 85025 COMPLETE CBC W/AUTO DIFF WBC: CPT

## 2020-03-17 PROCEDURE — 77386 CHG INTENSITY MODULATED RADIATION TX DLVR COMPLEX: CPT | Performed by: RADIOLOGY

## 2020-03-17 PROCEDURE — 77336 RADIATION PHYSICS CONSULT: CPT | Performed by: RADIOLOGY

## 2020-03-17 RX ORDER — OFLOXACIN 3 MG/ML
SOLUTION/ DROPS OPHTHALMIC
COMMUNITY
Start: 2020-03-10 | End: 2021-01-15

## 2020-03-17 RX ORDER — HYDROCODONE BITARTRATE AND ACETAMINOPHEN 5; 325 MG/1; MG/1
1-2 TABLET ORAL EVERY 6 HOURS PRN
Qty: 60 TABLET | Refills: 0 | Status: SHIPPED | OUTPATIENT
Start: 2020-03-17 | End: 2020-04-03 | Stop reason: SDUPTHER

## 2020-03-17 NOTE — NURSING NOTE
PICC line removed per protocol, tip intact. Pressure dressing covered with an occlusive dressing applied. Pt takes aspirin daily. Pressure held x 5 minutes, no bleeding. Pt advised to call with bleeding and remove the dressing in 24 hours. Pt and spouse v/u.

## 2020-03-17 NOTE — PROGRESS NOTES
MT lab review ( Capecitabine)        3/17/2020   WBC 3.40 - 10.80 10*3/mm3 4.14   Neutrophils Absolute 1.70 - 7.00 10*3/mm3 3.05   Hemoglobin 12.0 - 15.9 g/dL 10.5 (A)   Hematocrit 34.0 - 46.6 % 32.4 (A)   Platelets 140 - 450 10*3/mm3 155   Creatinine 0.60 - 1.10 mg/dL 0.77   eGFR Non African Am >60 mL/min/1.73 73   BUN 6 - 20 mg/dL 12   Sodium 134 - 145 mmol/L 142   Potassium 3.5 - 4.7 mmol/L 3.6   Glucose 74 - 124 mg/dL 105   Calcium 8.5 - 10.2 mg/dL 8.8   Albumin 3.50 - 5.20 g/dL 3.70   Total Protein 6.3 - 8.0 g/dL 7.0   AST (SGOT) 0 - 32 U/L 20   ALT (SGPT) 0 - 33 U/L 13   Alkaline Phosphatase 38 - 116 U/L 91   Total Bilirubin 0.2 - 1.2 mg/dL 0.4     ARNP dictation noted.  Norco added, will monitor for constipation.

## 2020-03-17 NOTE — PROGRESS NOTES
Subjective  rectal/ anal pain, picc line dressing change    REASON FOR CONSULTATION: Anal carcinoma     REQUESTING PHYSICIAN: Rosa Christianson MD, Imtiaz Sellers MD    History of Present Illness patient is a 79-year-old female with the above-mentioned history who is here today for lab review and toxicity check, continuing on Xeloda 1500 mg twice daily on the days of radiation.  She reports that she has 9 remaining days of radiation.  She also received a dose of mitomycin on 2/27/2017.    Patient continues to report issues with worsening rectal/anal discomfort.  Last week we prescribed tramadol for pain control, but she reports this is only lasting about 2 hours.  Her biggest concern is that she is having difficulty sleeping.  She is now willing to try something stronger, if it will help her rest at night.  She continues to do sits baths and Aquaphor on her skin irritation.  Otherwise, she is tolerating Xeloda fairly well.  She has had some intermittent diarrhea issues.  She denies any mouth sores.  She denies any hand-foot syndrome.      Past Medical History:   Diagnosis Date   • Anal cancer (CMS/HCC) 01/2020    SQUAMOUS CELL CARCINOMA, FOLLOWED BY DR. JOVITA BAUTISTA   • Aortic atherosclerosis (CMS/HCC)    • Arthritis    • Boil    • Chronic constipation    • Colon polyps    • Depression    • Encounter for screening mammogram for breast cancer     09/2014, 10/2014, 11/2015, 11/2016, 11/2017, 11/2018, 11/2019.   • Exudative senile macular retinal degeneration (CMS/HCC)    • GERD (gastroesophageal reflux disease)     FOLLOWED BY DR. 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       Hematologic/oncologic history: The patient is a 79-year-old female followed  (according to record) by primary care with depression, pulmonary hypertension, macular degeneration and retinal degeneration as well as aortic atherosclerosis.  She was seen December 10, 2019 by primary care plans to continue aspirin, lisinopril, pantoprazole and pravastatin.She had been noting bright red blood per rectum since the fall, 2019 with the presumption that she had hemorrhoidal bleeding.  She also indicates that she has been chronically constipated now requiring a laxative on a regular basis.  She has been tested previously with hemoccult but requested that this was not going to be helpful with her symptoms.  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     Review of the procedure note by Dr. Zen Franco January 3, 2020 indicates that there is a large mass at the anorectal area likely in the distal rectum, cauliflower with ulceration multiple biopsies taken. She has not been evaluated by colorectal surgery thus far.  The patient requested assessment and [facility] and is seen with her  and granddaughter.  She has not had weight loss, night sweats, fever, chills though has recognized change in bowel function as described above with bright red blood per rectum for several months.     The patient was assessed January 27 and felt to have an apparent anal carcinoma that extended into the rectum.  As result she was asked to undergo PET/CT scanning and assessment by colorectal surgery.  PET/CT demonstrated the primary tumor as an oblong tumor involving the 4 inferior SPECT SPECT the rectum measuring 5 cm x 3.6 cm with intense hypermetabolism at 33.1.  There is no evidence of disease within the abdomen pelvis  with mild hypermetabolism with borderline enlarged bilateral hilar and subcarinal lymph nodes with SUV up to 6.0.  These are suspected to be inflammatory in nature.  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  The patient is now scheduled to be seen by radiation therapy on February 7, 2001.     The patient is seen back February 4, 2020 and we have again discussed concurrent chemoradiotherapy rather than surgery using concurrent 5-FU (in this case capecitabine)and mitomycin.  This is a combination of 10 mg/m²-maximum 15 mg IV of mitomycin given through a free-flowing line, capecitabine is 825 mg meter squared on radiation days through the completion of radiation therapy.  This is been discussed in detail with she and her  of approximately 45 minutes therapy for 2020.  We discussed assessing her for Xeloda toxicity, have her undergo teaching for the combination treatment, subsequently scheduling a PICC line, and to proceed to radiation therapy consultation as scheduled on February 7.  We are hopeful that we can start within the next week.   The patient went on to undergo teaching, had a PICC line placed February 11, 2020 and has been seen by radiation therapy with treatment to begin February 17, 2020.  She is seen back with her  and we discussed the overall plan in detail.    Past Surgical History:   Procedure Laterality Date   • COLONOSCOPY W/ POLYPECTOMY N/A 01/03/2020    LARGE MASS IN ANORECTAL AREA, BX: SQUAMOUS CELL CARCINOMA, 3 TUBULAR ADENOMA POLYPS IN ASCENDING, 3 TUBULAR ADENOMA POLYPS IN TRANSVERSE, MELANOSIS COLI, DR. IMTIAZ SELLERS AT [facility]    • ENDOSCOPY N/A 01/03/2020    DR. IMTIAZ SELLERS AT [facility]   • HYSTERECTOMY N/A 1993        Current Outpatient Medications on File Prior to Visit    Medication Sig Dispense Refill   • aspirin 81 MG EC tablet Take 81 mg by mouth Daily.     • capecitabine (XELODA) 500 MG chemo tablet Take 3 tablets by mouth in the morning and 3 tablets by mouth in the evening on Monday-Friday with radiation. 60 tablet 0   • Cranberry 250 MG tablet Take  by mouth.     • diphenoxylate-atropine (LOMOTIL) 2.5-0.025 MG per tablet Take 2 tablets by mouth 2 (Two) Times a Day for 30 days. 2 tablets four times a day  tablet 0   • lisinopril (PRINIVIL,ZESTRIL) 10 MG tablet      • Multiple Vitamins-Minerals (MULTIVITAMIN ADULT) tablet Take  by mouth.     • Multiple Vitamins-Minerals (PRESERVISION AREDS 2 PO) Take  by mouth.     • ofloxacin (OCUFLOX) 0.3 % ophthalmic solution      • ondansetron (ZOFRAN) 8 MG tablet Take 1 tablet by mouth 3 (Three) Times a Day As Needed for Nausea or Vomiting. 30 tablet 5   • pantoprazole (PROTONIX) 40 MG EC tablet      • pravastatin (PRAVACHOL) 40 MG tablet      • prochlorperazine (COMPAZINE) 10 MG tablet Take 1 tablet by mouth Every 6 (Six) Hours As Needed for Nausea or Vomiting. 40 tablet 2   • traMADol (ULTRAM) 50 MG tablet Take 1 tablet by mouth Every 6 (Six) Hours As Needed for Moderate Pain . 40 tablet 0   • vitamin C (ASCORBIC ACID) 500 MG tablet Take 500 mg by mouth Daily.     • vitamin E 400 UNIT capsule Take 400 Units by mouth Daily.       No current facility-administered medications on file prior to visit.         ALLERGIES:    Allergies   Allergen Reactions   • Codeine Rash   • Lipitor [Atorvastatin] Rash        Social History     Socioeconomic History   • Marital status:      Spouse name: Dimitri   • Number of children: 2   • Years of education: High school   • Highest education level: Not on file   Occupational History     Employer: RETIRED   Tobacco Use   • Smoking status: Former Smoker     Packs/day: 0.50     Years: 40.00     Pack years: 20.00     Types: Cigarettes     Start date: 10/31/2004   • Smokeless tobacco: Never Used  "  Substance and Sexual Activity   • Alcohol use: Not Currently     Frequency: Never   • Drug use: Never   • Sexual activity: Yes     Partners: Male     Birth control/protection: Post-menopausal, Surgical     Comment: .        Family History   Problem Relation Age of Onset   • Cancer Sister         Lung Cancer   • Lung cancer Sister    • Hypertension Brother    • Heart disease Brother    • Cancer Sister         Brain Cancer   • Brain cancer Sister    • Heart disease Father    • Cancer Sister    • Lung cancer Sister    • Cancer Sister    • Lung cancer Sister         Review of Systems   Constitutional: Negative for activity change, appetite change, chills, fatigue and fever.   HENT: Negative for mouth sores, nosebleeds and trouble swallowing.    Respiratory: Negative for cough and shortness of breath.    Cardiovascular: Negative for chest pain and leg swelling.   Gastrointestinal: Positive for diarrhea and nausea. Negative for abdominal pain and vomiting.        See HPI   Genitourinary: Negative for difficulty urinating.   Skin: Negative for rash.   Neurological: Negative for dizziness, weakness and numbness.   Hematological: Negative for adenopathy. Does not bruise/bleed easily.   Psychiatric/Behavioral: Negative for sleep disturbance.   3/17/2020 ROS unchanged from above except as updated.     Objective     Vitals:    03/17/20 0926   BP: 126/72   Pulse: 76   Resp: 16   Temp: 98.3 °F (36.8 °C)   SpO2: 91%   Weight: 66.8 kg (147 lb 3.2 oz)   Height: 154.9 cm (60.98\")   PainSc: 10-Worst pain ever   PainLoc: Comment: waist down     Current Status 3/17/2020   ECOG score 0       Physical Exam   Constitutional: She is oriented to person, place, and time. She appears well-developed and well-nourished.   Appears uncomfortable sitting   HENT:   Head: Normocephalic and atraumatic.   Mouth/Throat: Oropharynx is clear and moist and mucous membranes are normal. No oropharyngeal exudate.   Eyes: Pupils are equal, round, and " reactive to light.   Neck: Normal range of motion.   Cardiovascular: Normal rate, regular rhythm and normal heart sounds.   No murmur heard.  Pulmonary/Chest: Effort normal and breath sounds normal. No respiratory distress. She has no wheezes. She has no rhonchi. She has no rales.   Abdominal: Soft. Normal appearance and bowel sounds are normal. She exhibits no distension. There is no hepatosplenomegaly.   Musculoskeletal: Normal range of motion. She exhibits no edema.   Right upper extremity PICC line   Neurological: She is alert and oriented to person, place, and time.   Skin: Skin is warm and dry. No rash noted.   Psychiatric: She has a normal mood and affect.   Vitals reviewed.  3/17/2020 physical exam unchanged from above.     RECENT LABS:  Hematology WBC   Date Value Ref Range Status   03/17/2020 4.14 3.40 - 10.80 10*3/mm3 Final     RBC   Date Value Ref Range Status   03/17/2020 3.19 (L) 3.77 - 5.28 10*6/mm3 Final     Hemoglobin   Date Value Ref Range Status   03/17/2020 10.5 (L) 12.0 - 15.9 g/dL Final     Hematocrit   Date Value Ref Range Status   03/17/2020 32.4 (L) 34.0 - 46.6 % Final     Platelets   Date Value Ref Range Status   03/17/2020 155 140 - 450 10*3/mm3 Final        FDG PET/CT IMAGING SKULL BASE TO MID THIGH  1/30/2020    FINDINGS: The primary neoplasm is an oblong tumor mass involving the far  inferior aspect of the rectum and the pain is that measures  approximately 5 cm in greatest cephalocaudad dimension and 3.6 cm in  greatest mediolateral dimension. It demonstrates intense hypermetabolism  with a maximum measured as she 33.1 g/mL. There is no metabolic evidence  of metastatic disease within the abdomen or pelvis. Particular, no  hypermetabolic lymphadenopathy is identified. There is physiologic  distribution of the radiopharmaceutical within the neck. Imaging of the  chest shows mild hypermetabolism within a borderline enlarged bilateral  hilar and subcarinal lymph nodes. These have maximum  SUV in the range of  6.0 g/mL. This would be a very usual location for isolated metastatic  disease from an anorectal neoplasm. These lymph nodes are favored to be  inflammatory in etiology.     IMPRESSION:  Intensely hypermetabolic neoplasm involving the far inferior  aspect of the rectum and essentially the entire anus. There is no  compelling metabolic evidence of metastatic disease within the neck,  chest, abdomen or pelvis.     This report was finalized on 1/31/2020 2:39 PM by Dr. Mayco Patel M.D.    Assessment/Plan       79-year-old female followed by primary care as described above with development over the last 4 to 5 months of bright red blood per rectum that became associated with worsening constipation.  The patient symptoms, unfortunately, progressed until she had GI assessment that revealed a mass in the distal rectum/anal rectal region that was cauliflower in description with ulceration.  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  She has been advised that she has a rectal malignancy.                                              In discussing this with the patient and her family we discussed that a primary rectal squamous cell carcinomas is a rare disorder and one wonders whether this is not an anal carcinoma that has extended into the rectum.  They are, incidentally, treated like rectal adenocarcinomas with surgical resection but there are studies that use chemoradiotherapy.      This patient needs to be assessed by colorectal surgery and undergo additional staging endoscopically.  We have contacted Dr. Dara Stack's office and, as they review the record, have requested that the patient proceed with laboratory studies, PET/CT and colorectal surgery assessment.  Additional laboratory studies include a CEA of 3.07, no evidence of iron deficiency, CMP with total protein mildly elevated at 8.3 g/dL.  This did occur and the the patient's studies suggest T2 N0  M0-stage IIA disease.      After review by colorectal surgery the patient is felt best treated by chemoradiation therapy and she is seen with her  February 4, 2020 at which time we discussed how this can be accomplished.  The patient proceeded to laboratory for DPD testing which is currently pending as she is seen February 12, 2020.  We have obtained Xeloda at a 25 mg meter squared to 1500 mg p.o. twice daily during days of radiation therapy with mitomycin also planned - 10 mg meter squared to 15 mg dosing anticipated day 1.    Treatment initiated on 2/17/2020 with concurrent chemotherapy and radiation.  Patient received IV mitomycin 15 mg, as well as initiated Xeloda 1500 Mg twice daily on days of radiation    2/24/2020 patient returns for evaluation.  Overall, tolerating treatment fairly well, other than some mild nausea.  .  Zofran has not been beneficial.  Will prescribe Compazine 10 mg every 6 hours as needed for nausea.  We will flush her PICC line today and change her dressing.  Patient will return in 1 week for reevaluation.    3/10/2020 patient returns for evaluation, starting to have significant discomfort in her anorectal region, particularly with bowel movements and sitting.  She does not have pain medication.  She is reluctant to try pain medication, but is willing to try something particularly to help her sleep at night.  We will prescribe tramadol 1 every 6 hours as needed for pain.    3/17/2020 Worsening pain. Tramadol only helping for about 2 hours.  After discussion and review with Dr. Muniz, we will prescribe Norco 5/325 1 to 2 tablets every 6 hours as needed for pain.  I have warned her that this can cause issues with constipation.  Also, we will go ahead and remove her PICC line today, as her counts have remained stable.    PLAN:  1. Continue Xeloda 1500 mg twice daily on days of radiation.  2. PICC line to be removed today.  3. Norco 5/325 1 to 2 tablets every 6 hours as needed for pain  control #60 prescribed.  No refills.    4. Return in 1 week for follow-up visit with Dr. Gray with repeat CBC and CMP.

## 2020-03-18 PROCEDURE — 77014 CHG CT GUIDANCE RADIATION THERAPY FLDS PLACEMENT: CPT | Performed by: RADIOLOGY

## 2020-03-18 PROCEDURE — 77386 CHG INTENSITY MODULATED RADIATION TX DLVR COMPLEX: CPT | Performed by: RADIOLOGY

## 2020-03-18 PROCEDURE — 77386: CPT | Performed by: RADIOLOGY

## 2020-03-19 ENCOUNTER — RADIATION ONCOLOGY WEEKLY ASSESSMENT (OUTPATIENT)
Dept: RADIATION ONCOLOGY | Facility: HOSPITAL | Age: 80
End: 2020-03-19

## 2020-03-19 DIAGNOSIS — C21.0 ANAL CANCER (HCC): Primary | ICD-10-CM

## 2020-03-19 PROCEDURE — 77386 CHG INTENSITY MODULATED RADIATION TX DLVR COMPLEX: CPT | Performed by: RADIOLOGY

## 2020-03-19 PROCEDURE — 77386: CPT | Performed by: RADIOLOGY

## 2020-03-19 PROCEDURE — 77014 CHG CT GUIDANCE RADIATION THERAPY FLDS PLACEMENT: CPT | Performed by: RADIOLOGY

## 2020-03-19 NOTE — PROGRESS NOTES
Physician Weekly Management Note    Diagnosis:     Diagnosis Plan   1. Anal cancer (CMS/HCC)         RT Details:  fx 24/30 anus/pelvis    Notes on Treatment course, Films, Medical progress:  Doing fairly well, fatigue, skin erythema, pain as expected in rectal anal area but controlled    Weekly Management:  Medication reviewed?   Yes  New medications given?   No  Problemlist reviewed?   Yes  Problem added?   No  Issues raised requiring referral to support services - task assigned to:  na    Technical aspects reviewed:  Weekly OBI approved?   Yes  Weekly port films approved?   Yes  Change requests noted on port film?   No  Patient setup and plan reviewed?   Yes    Chart Reviewed:  Continue current treatment plan?   Yes  Treatment plan change requested?   No  CBC reviewed?   Yes  Concurrent Chemo?   Yes    Objective     Toxicities:   Fatigue, skin erythema     Review of Systems   Constitutional: Negative.    Gastrointestinal: Positive for anal bleeding, blood in stool and rectal pain.   Genitourinary: Positive for difficulty urinating.   Skin: Positive for color change.          There were no vitals filed for this visit.    Current Status 3/17/2020   ECOG score 0       Physical Exam   Constitutional: She appears well-developed and well-nourished.   Genitourinary:   Genitourinary Comments: Moderate erythema over perianal skin and genitalia and groin           Problem Summary List    Diagnosis:     Diagnosis Plan   1. Anal cancer (CMS/HCC)       Pathology:   Anal squamous cell carcinom    Past Medical History:   Diagnosis Date   • Anal cancer (CMS/[facility]) 01/2020    SQUAMOUS CELL CARCINOMA, FOLLOWED BY DR. JOVITA BAUTISTA   • Aortic atherosclerosis (CMS/[facility])    • Arthritis    • Boil    • Chronic constipation    • Colon polyps    • Depression    • Encounter for screening mammogram for breast cancer     09/2014, 10/2014, 11/2015, 11/2016, 11/2017, 11/2018, 11/2019.   • Exudative senile macular retinal degeneration (CMS/[facility])     • GERD (gastroesophageal reflux disease)     FOLLOWED BY DR. 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         Past Surgical History:   Procedure Laterality Date   • COLONOSCOPY W/ POLYPECTOMY N/A 01/03/2020    LARGE MASS IN ANORECTAL AREA, BX: SQUAMOUS CELL CARCINOMA, 3 TUBULAR ADENOMA POLYPS IN ASCENDING, 3 TUBULAR ADENOMA POLYPS IN TRANSVERSE, MELANOSIS COLI, DR. IMTIAZ SELLERS AT [facility]    • ENDOSCOPY N/A 01/03/2020    DR. IMTIAZ SELLERS AT [facility]   • HYSTERECTOMY N/A 1993         Current Outpatient Medications on File Prior to Visit   Medication Sig Dispense Refill   • aspirin 81 MG EC tablet Take 81 mg by mouth Daily.     • capecitabine (XELODA) 500 MG chemo tablet Take 3 tablets by mouth in the morning and 3 tablets by mouth in the evening on Monday-Friday with radiation. 60 tablet 0   • Cranberry 250 MG tablet Take  by mouth.     • diphenoxylate-atropine (LOMOTIL) 2.5-0.025 MG per tablet Take 2 tablets by mouth 2 (Two) Times a Day for 30 days. 2 tablets four times a day  tablet 0   • HYDROcodone-acetaminophen (NORCO) 5-325 MG per tablet Take 1-2 tablets by mouth Every 6 (Six) Hours As Needed for Moderate Pain . 60 tablet 0   • lisinopril (PRINIVIL,ZESTRIL) 10 MG tablet      • Multiple Vitamins-Minerals (MULTIVITAMIN ADULT) tablet Take  by mouth.     • Multiple Vitamins-Minerals (PRESERVISION AREDS 2 PO) Take  by mouth.     • ofloxacin (OCUFLOX) 0.3 % ophthalmic solution      • ondansetron (ZOFRAN) 8 MG tablet Take 1 tablet by mouth 3 (Three) Times a Day As Needed for Nausea or Vomiting. 30 tablet 5   • pantoprazole (PROTONIX) 40 MG EC tablet      • pravastatin (PRAVACHOL) 40 MG tablet      • prochlorperazine  (COMPAZINE) 10 MG tablet Take 1 tablet by mouth Every 6 (Six) Hours As Needed for Nausea or Vomiting. 40 tablet 2   • traMADol (ULTRAM) 50 MG tablet Take 1 tablet by mouth Every 6 (Six) Hours As Needed for Moderate Pain . 40 tablet 0   • vitamin C (ASCORBIC ACID) 500 MG tablet Take 500 mg by mouth Daily.     • vitamin E 400 UNIT capsule Take 400 Units by mouth Daily.       No current facility-administered medications on file prior to visit.        Allergies   Allergen Reactions   • Codeine Rash   • Lipitor [Atorvastatin] Rash         Referring Provider:    No referring provider defined for this encounter.    Oncologist:  No primary care provider on file.      Seen and approved by:  Mariana Beyer MD  03/19/2020

## 2020-03-20 ENCOUNTER — MEDICATION THERAPY MANAGEMENT (OUTPATIENT)
Dept: PHARMACY | Facility: HOSPITAL | Age: 80
End: 2020-03-20

## 2020-03-20 PROCEDURE — 77014 CHG CT GUIDANCE RADIATION THERAPY FLDS PLACEMENT: CPT | Performed by: RADIOLOGY

## 2020-03-20 PROCEDURE — 77386 CHG INTENSITY MODULATED RADIATION TX DLVR COMPLEX: CPT | Performed by: RADIOLOGY

## 2020-03-20 PROCEDURE — 77386: CPT | Performed by: RADIOLOGY

## 2020-03-20 NOTE — PROGRESS NOTES
MTM telephone encounter re adherence and side effects for Capecitabine    Pt states she is taking Norco at hs and using cream during the day to help alleviate anorectal pain.  She has not had a bowel movement since starting Norco earlier this week, and was encouraged to start taking a stool softener today.  She also is experiencing some nausea and manages that with prn ondansetron.  She reports generalized fatigue as well.   She is staying home with the exception of her medical appointments.  We discussed importance of social distancing and she denies any infectious symptoms such as fever or cough.  She is not allowing home visitors. She denies mouth sores or any redness of hands/feet. No other issues/concerns were voiced.

## 2020-03-23 ENCOUNTER — LAB (OUTPATIENT)
Dept: LAB | Facility: HOSPITAL | Age: 80
End: 2020-03-23

## 2020-03-23 ENCOUNTER — MEDICATION THERAPY MANAGEMENT (OUTPATIENT)
Dept: PHARMACY | Facility: HOSPITAL | Age: 80
End: 2020-03-23

## 2020-03-23 ENCOUNTER — APPOINTMENT (OUTPATIENT)
Dept: ONCOLOGY | Facility: HOSPITAL | Age: 80
End: 2020-03-23

## 2020-03-23 ENCOUNTER — APPOINTMENT (OUTPATIENT)
Dept: LAB | Facility: HOSPITAL | Age: 80
End: 2020-03-23

## 2020-03-23 ENCOUNTER — OFFICE VISIT (OUTPATIENT)
Dept: ONCOLOGY | Facility: CLINIC | Age: 80
End: 2020-03-23

## 2020-03-23 VITALS
HEIGHT: 61 IN | BODY MASS INDEX: 26.91 KG/M2 | TEMPERATURE: 97.7 F | WEIGHT: 142.5 LBS | RESPIRATION RATE: 18 BRPM | HEART RATE: 84 BPM | SYSTOLIC BLOOD PRESSURE: 130 MMHG | DIASTOLIC BLOOD PRESSURE: 74 MMHG | OXYGEN SATURATION: 90 %

## 2020-03-23 DIAGNOSIS — C21.1 MALIGNANT NEOPLASM OF ANAL CANAL (HCC): ICD-10-CM

## 2020-03-23 DIAGNOSIS — C21.0 ANAL CANCER (HCC): Primary | ICD-10-CM

## 2020-03-23 DIAGNOSIS — C18.9 MALIGNANT NEOPLASM OF COLON, UNSPECIFIED PART OF COLON (HCC): Primary | ICD-10-CM

## 2020-03-23 LAB
ALBUMIN SERPL-MCNC: 3.9 G/DL (ref 3.5–5.2)
ALBUMIN/GLOB SERPL: 1.1 G/DL (ref 1.1–2.4)
ALP SERPL-CCNC: 94 U/L (ref 38–116)
ALT SERPL W P-5'-P-CCNC: 12 U/L (ref 0–33)
ANION GAP SERPL CALCULATED.3IONS-SCNC: 14.7 MMOL/L (ref 5–15)
AST SERPL-CCNC: 21 U/L (ref 0–32)
BASOPHILS # BLD AUTO: 0.02 10*3/MM3 (ref 0–0.2)
BASOPHILS NFR BLD AUTO: 0.4 % (ref 0–1.5)
BILIRUB SERPL-MCNC: 0.5 MG/DL (ref 0.2–1.2)
BUN BLD-MCNC: 15 MG/DL (ref 6–20)
BUN/CREAT SERPL: 21.1 (ref 7.3–30)
CALCIUM SPEC-SCNC: 9.1 MG/DL (ref 8.5–10.2)
CHLORIDE SERPL-SCNC: 101 MMOL/L (ref 98–107)
CO2 SERPL-SCNC: 25.3 MMOL/L (ref 22–29)
CREAT BLD-MCNC: 0.71 MG/DL (ref 0.6–1.1)
DEPRECATED RDW RBC AUTO: 43.5 FL (ref 37–54)
EOSINOPHIL # BLD AUTO: 0.19 10*3/MM3 (ref 0–0.4)
EOSINOPHIL NFR BLD AUTO: 3.4 % (ref 0.3–6.2)
ERYTHROCYTE [DISTWIDTH] IN BLOOD BY AUTOMATED COUNT: 16.1 % (ref 12.3–15.4)
GFR SERPL CREATININE-BSD FRML MDRD: 80 ML/MIN/1.73
GLOBULIN UR ELPH-MCNC: 3.6 GM/DL (ref 1.8–3.5)
GLUCOSE BLD-MCNC: 112 MG/DL (ref 74–124)
HCT VFR BLD AUTO: 33 % (ref 34–46.6)
HGB BLD-MCNC: 10.8 G/DL (ref 12–15.9)
IMM GRANULOCYTES # BLD AUTO: 0.01 10*3/MM3 (ref 0–0.05)
IMM GRANULOCYTES NFR BLD AUTO: 0.2 % (ref 0–0.5)
LYMPHOCYTES # BLD AUTO: 0.37 10*3/MM3 (ref 0.7–3.1)
LYMPHOCYTES NFR BLD AUTO: 6.7 % (ref 19.6–45.3)
MCH RBC QN AUTO: 32.8 PG (ref 26.6–33)
MCHC RBC AUTO-ENTMCNC: 32.7 G/DL (ref 31.5–35.7)
MCV RBC AUTO: 100.3 FL (ref 79–97)
MONOCYTES # BLD AUTO: 0.46 10*3/MM3 (ref 0.1–0.9)
MONOCYTES NFR BLD AUTO: 8.3 % (ref 5–12)
NEUTROPHILS # BLD AUTO: 4.47 10*3/MM3 (ref 1.7–7)
NEUTROPHILS NFR BLD AUTO: 81 % (ref 42.7–76)
NRBC BLD AUTO-RTO: 0.7 /100 WBC (ref 0–0.2)
PLATELET # BLD AUTO: 188 10*3/MM3 (ref 140–450)
PMV BLD AUTO: 9.2 FL (ref 6–12)
POTASSIUM BLD-SCNC: 3.2 MMOL/L (ref 3.5–4.7)
PROT SERPL-MCNC: 7.5 G/DL (ref 6.3–8)
RBC # BLD AUTO: 3.29 10*6/MM3 (ref 3.77–5.28)
SODIUM BLD-SCNC: 141 MMOL/L (ref 134–145)
WBC NRBC COR # BLD: 5.52 10*3/MM3 (ref 3.4–10.8)

## 2020-03-23 PROCEDURE — 77386: CPT | Performed by: RADIOLOGY

## 2020-03-23 PROCEDURE — 85025 COMPLETE CBC W/AUTO DIFF WBC: CPT

## 2020-03-23 PROCEDURE — 80053 COMPREHEN METABOLIC PANEL: CPT

## 2020-03-23 PROCEDURE — 77386 CHG INTENSITY MODULATED RADIATION TX DLVR COMPLEX: CPT | Performed by: RADIOLOGY

## 2020-03-23 PROCEDURE — 77014 CHG CT GUIDANCE RADIATION THERAPY FLDS PLACEMENT: CPT | Performed by: RADIOLOGY

## 2020-03-23 PROCEDURE — 99215 OFFICE O/P EST HI 40 MIN: CPT | Performed by: INTERNAL MEDICINE

## 2020-03-23 PROCEDURE — 36415 COLL VENOUS BLD VENIPUNCTURE: CPT

## 2020-03-23 RX ORDER — PHENAZOPYRIDINE HYDROCHLORIDE 200 MG/1
TABLET, FILM COATED ORAL
Qty: 30 TABLET | Refills: 1 | Status: ON HOLD | OUTPATIENT
Start: 2020-03-23 | End: 2020-08-07

## 2020-03-23 NOTE — PROGRESS NOTES
[facility] lab review        3/23/2020   WBC 3.40 - 10.80 10*3/mm3 5.52   Neutrophils Absolute 1.70 - 7.00 10*3/mm3 4.47   Hemoglobin 12.0 - 15.9 g/dL 10.8 (A)   Hematocrit 34.0 - 46.6 % 33.0 (A)   Platelets 140 - 450 10*3/mm3 188   Creatinine 0.60 - 1.10 mg/dL 0.71   eGFR Non African Am >60 mL/min/1.73 80   BUN 6 - 20 mg/dL 15   Sodium 134 - 145 mmol/L 141   Potassium 3.5 - 4.7 mmol/L 3.2 (A)   Glucose 74 - 124 mg/dL 112   Calcium 8.5 - 10.2 mg/dL 9.1   Albumin 3.50 - 5.20 g/dL 3.90   Total Protein 6.3 - 8.0 g/dL 7.5   AST (SGOT) 0 - 32 U/L 21   ALT (SGPT) 0 - 33 U/L 12   Alkaline Phosphatase 38 - 116 U/L 94   Total Bilirubin 0.2 - 1.2 mg/dL 0.5     Dr Gray's dictation noted.  Pyridium added for urinary spasms.

## 2020-03-23 NOTE — PROGRESS NOTES
Subjective  rectal/ anal pain, urinary spasm periodically    REASON FOR CONSULTATION: Anal carcinoma     REQUESTING PHYSICIAN: Rosa Christianson MD, Imtiaz Sellers MD    History of Present Illness patient is a 79-year-old female with the above-mentioned history who is here today for lab review and toxicity check, continuing on Xeloda 1500 mg twice daily on the days of radiation.  She reports that she has 5 remaining days of radiation.  She also received a dose of mitomycin on 2/27/2017.    Patient continues to report issues with worsening rectal/anal discomfort.  We have previously used tramadol but in subsequent discussions particularly when seen last week we switched this to Norco which is been more effective. She continues to do sits baths and Aquaphor on her skin irritation.  Otherwise, she is tolerating Xeloda fairly well.  She has had some intermittent diarrhea issues.  She denies any mouth sores.  She denies any hand-foot syndrome.  She is having urinary spasm.  In discussing all of this with her March 23, 2020 she states she will complete radiation therapy at the end of this week.     We discussed moving to a trial of Pyridium to reduce urinary spasm on a once or twice a day basis.      Past Medical History:   Diagnosis Date   • Anal cancer (CMS/[facility]) 01/2020    SQUAMOUS CELL CARCINOMA, FOLLOWED BY DR. JOVITA BAUTISTA   • Aortic atherosclerosis (CMS/[facility])    • Arthritis    • Boil    • Chronic constipation    • Colon polyps    • Depression    • Encounter for screening mammogram for breast cancer     09/2014, 10/2014, 11/2015, 11/2016, 11/2017, 11/2018, 11/2019.   • Exudative senile macular retinal degeneration (CMS/[facility])    • GERD (gastroesophageal reflux disease)     FOLLOWED BY DR. 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       Hematologic/oncologic history: The patient is a 79-year-old female followed (according to record) by primary care with depression, pulmonary hypertension, macular degeneration and retinal degeneration as well as aortic atherosclerosis.  She was seen December 10, 2019 by primary care plans to continue aspirin, lisinopril, pantoprazole and pravastatin.She had been noting bright red blood per rectum since the fall, 2019 with the presumption that she had hemorrhoidal bleeding.  She also indicates that she has been chronically constipated now requiring a laxative on a regular basis.  She has been tested previously with hemoccult but requested that this was not going to be helpful with her symptoms.  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     Review of the procedure note by Dr. Zen Franco January 3, 2020 indicates that there is a large mass at the anorectal area likely in the distal rectum, cauliflower with ulceration multiple biopsies taken. She has not been evaluated by colorectal surgery thus far.  The patient requested assessment and [facility] and is seen with her  and granddaughter.  She has not had weight loss, night sweats, fever, chills though has recognized change in bowel function as described above with bright red blood per rectum for several months.     The patient was assessed January 27 and felt to have an apparent anal carcinoma that extended into the rectum.  As result she was asked to undergo PET/CT scanning and assessment by colorectal surgery.  PET/CT demonstrated the primary tumor as an oblong tumor involving the  4 inferior SPECT SPECT the rectum measuring 5 cm x 3.6 cm with intense hypermetabolism at 33.1.  There is no evidence of disease within the abdomen pelvis with mild hypermetabolism with borderline enlarged bilateral hilar and subcarinal lymph nodes with SUV up to 6.0.  These are suspected to be inflammatory in nature.  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  The patient is now scheduled to be seen by radiation therapy on February 7, 2001.     The patient is seen back February 4, 2020 and we have again discussed concurrent chemoradiotherapy rather than surgery using concurrent 5-FU (in this case capecitabine)and mitomycin.  This is a combination of 10 mg/m²-maximum 15 mg IV of mitomycin given through a free-flowing line, capecitabine is 825 mg meter squared on radiation days through the completion of radiation therapy.  This is been discussed in detail with she and her  of approximately 45 minutes therapy for 2020.  We discussed assessing her for Xeloda toxicity, have her undergo teaching for the combination treatment, subsequently scheduling a PICC line, and to proceed to radiation therapy consultation as scheduled on February 7.  We are hopeful that we can start within the next week.   The patient went on to undergo teaching, had a PICC line placed February 11, 2020 and has been seen by radiation therapy with treatment to begin February 17, 2020.  She is seen back with her  and we discussed the overall plan in detail.    Past Surgical History:   Procedure Laterality Date   • COLONOSCOPY W/ POLYPECTOMY N/A 01/03/2020    LARGE MASS IN ANORECTAL AREA, BX: SQUAMOUS CELL CARCINOMA, 3 TUBULAR ADENOMA POLYPS IN ASCENDING, 3 TUBULAR ADENOMA POLYPS IN TRANSVERSE, MELANOSIS COLI, DR. IMTIAZ SELLERS AT [facility]    • ENDOSCOPY  N/A 01/03/2020    DR. IMTIAZ SELLERS AT [facility]   • HYSTERECTOMY N/A 1993        Current Outpatient Medications on File Prior to Visit   Medication Sig Dispense Refill   • aspirin 81 MG EC tablet Take 81 mg by mouth Daily.     • capecitabine (XELODA) 500 MG chemo tablet Take 3 tablets by mouth in the morning and 3 tablets by mouth in the evening on Monday-Friday with radiation. 60 tablet 0   • Cranberry 250 MG tablet Take  by mouth.     • diphenoxylate-atropine (LOMOTIL) 2.5-0.025 MG per tablet Take 2 tablets by mouth 2 (Two) Times a Day for 30 days. 2 tablets four times a day  tablet 0   • HYDROcodone-acetaminophen (NORCO) 5-325 MG per tablet Take 1-2 tablets by mouth Every 6 (Six) Hours As Needed for Moderate Pain . 60 tablet 0   • lisinopril (PRINIVIL,ZESTRIL) 10 MG tablet      • Multiple Vitamins-Minerals (MULTIVITAMIN ADULT) tablet Take  by mouth.     • Multiple Vitamins-Minerals (PRESERVISION AREDS 2 PO) Take  by mouth.     • ofloxacin (OCUFLOX) 0.3 % ophthalmic solution      • ondansetron (ZOFRAN) 8 MG tablet Take 1 tablet by mouth 3 (Three) Times a Day As Needed for Nausea or Vomiting. 30 tablet 5   • pantoprazole (PROTONIX) 40 MG EC tablet      • pravastatin (PRAVACHOL) 40 MG tablet      • prochlorperazine (COMPAZINE) 10 MG tablet Take 1 tablet by mouth Every 6 (Six) Hours As Needed for Nausea or Vomiting. 40 tablet 2   • traMADol (ULTRAM) 50 MG tablet Take 1 tablet by mouth Every 6 (Six) Hours As Needed for Moderate Pain . 40 tablet 0   • vitamin C (ASCORBIC ACID) 500 MG tablet Take 500 mg by mouth Daily.     • vitamin E 400 UNIT capsule Take 400 Units by mouth Daily.       No current facility-administered medications on file prior to visit.         ALLERGIES:    Allergies   Allergen Reactions   • Codeine Rash   • Lipitor [Atorvastatin] Rash        Social History     Socioeconomic History   • Marital status:      Spouse name: Dimitri   • Number of children: 2   • Years of education:  High school   • Highest education level: Not on file   Occupational History     Employer: RETIRED   Tobacco Use   • Smoking status: Former Smoker     Packs/day: 0.50     Years: 40.00     Pack years: 20.00     Types: Cigarettes     Start date: 10/31/2004   • Smokeless tobacco: Never Used   Substance and Sexual Activity   • Alcohol use: Not Currently     Frequency: Never   • Drug use: Never   • Sexual activity: Yes     Partners: Male     Birth control/protection: Post-menopausal, Surgical     Comment: .        Family History   Problem Relation Age of Onset   • Cancer Sister         Lung Cancer   • Lung cancer Sister    • Hypertension Brother    • Heart disease Brother    • Cancer Sister         Brain Cancer   • Brain cancer Sister    • Heart disease Father    • Cancer Sister    • Lung cancer Sister    • Cancer Sister    • Lung cancer Sister         Review of Systems   Constitutional: Negative for activity change, appetite change, chills, fatigue and fever.   HENT: Negative for mouth sores, nosebleeds and trouble swallowing.    Respiratory: Negative for cough and shortness of breath.    Cardiovascular: Negative for chest pain and leg swelling.   Gastrointestinal: Positive for diarrhea and nausea. Negative for abdominal pain and vomiting.        See HPI   Genitourinary: Positive for pelvic pain and urgency. Negative for difficulty urinating.   Skin: Negative for rash.   Neurological: Negative for dizziness, weakness and numbness.   Hematological: Negative for adenopathy. Does not bruise/bleed easily.   Psychiatric/Behavioral: Negative for sleep disturbance.   3/17/2020 ROS unchanged from above except as updated.     Objective     There were no vitals filed for this visit.  Current Status 3/17/2020   ECOG score 0       Physical Exam   Constitutional: She is oriented to person, place, and time. She appears well-developed and well-nourished.   Appears uncomfortable sitting   HENT:   Mouth/Throat: Mucous membranes  are normal. No oropharyngeal exudate.   Eyes: Pupils are equal, round, and reactive to light.   Neck: Normal range of motion.   Pulmonary/Chest: She has no rhonchi.   Abdominal: Normal appearance. There is no hepatosplenomegaly.   Musculoskeletal: Normal range of motion. She exhibits no edema.   Neurological: She is alert and oriented to person, place, and time.   Skin: Skin is warm and dry. No rash noted.   Psychiatric: She has a normal mood and affect.   Vitals reviewed.      RECENT LABS:  Hematology WBC   Date Value Ref Range Status   03/17/2020 4.14 3.40 - 10.80 10*3/mm3 Final     RBC   Date Value Ref Range Status   03/17/2020 3.19 (L) 3.77 - 5.28 10*6/mm3 Final     Hemoglobin   Date Value Ref Range Status   03/17/2020 10.5 (L) 12.0 - 15.9 g/dL Final     Hematocrit   Date Value Ref Range Status   03/17/2020 32.4 (L) 34.0 - 46.6 % Final     Platelets   Date Value Ref Range Status   03/17/2020 155 140 - 450 10*3/mm3 Final        FDG PET/CT IMAGING SKULL BASE TO MID THIGH  1/30/2020    FINDINGS: The primary neoplasm is an oblong tumor mass involving the far  inferior aspect of the rectum and the pain is that measures  approximately 5 cm in greatest cephalocaudad dimension and 3.6 cm in  greatest mediolateral dimension. It demonstrates intense hypermetabolism  with a maximum measured as she 33.1 g/mL. There is no metabolic evidence  of metastatic disease within the abdomen or pelvis. Particular, no  hypermetabolic lymphadenopathy is identified. There is physiologic  distribution of the radiopharmaceutical within the neck. Imaging of the  chest shows mild hypermetabolism within a borderline enlarged bilateral  hilar and subcarinal lymph nodes. These have maximum SUV in the range of  6.0 g/mL. This would be a very usual location for isolated metastatic  disease from an anorectal neoplasm. These lymph nodes are favored to be  inflammatory in etiology.     IMPRESSION:  Intensely hypermetabolic neoplasm involving the far  inferior  aspect of the rectum and essentially the entire anus. There is no  compelling metabolic evidence of metastatic disease within the neck,  chest, abdomen or pelvis.     This report was finalized on 1/31/2020 2:39 PM by Dr. Mayco Patel M.D.    Assessment/Plan       79-year-old female followed by primary care as described above with development over the last 4 to 5 months of bright red blood per rectum that became associated with worsening constipation.  The patient symptoms, unfortunately, progressed until she had GI assessment that revealed a mass in the distal rectum/anal rectal region that was cauliflower in description with ulceration.  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  She has been advised that she has a rectal malignancy.                                              In discussing this with the patient and her family we discussed that a primary rectal squamous cell carcinomas is a rare disorder and one wonders whether this is not an anal carcinoma that has extended into the rectum.  They are, incidentally, treated like rectal adenocarcinomas with surgical resection but there are studies that use chemoradiotherapy.      This patient needs to be assessed by colorectal surgery and undergo additional staging endoscopically.  We have contacted Dr. Dara Stack's office and, as they review the record, have requested that the patient proceed with laboratory studies, PET/CT and colorectal surgery assessment.  Additional laboratory studies include a CEA of 3.07, no evidence of iron deficiency, CMP with total protein mildly elevated at 8.3 g/dL.  This did occur and the the patient's studies suggest T2 N0 M0-stage IIA disease.      After review by colorectal surgery the patient is felt best treated by chemoradiation therapy and she is seen with her  February 4, 2020 at which time we discussed how this can be accomplished.  The patient proceeded to  laboratory for DPD testing which is currently pending as she is seen February 12, 2020.  We have obtained Xeloda at a 25 mg meter squared to 1500 mg p.o. twice daily during days of radiation therapy with mitomycin also planned - 10 mg meter squared to 15 mg dosing anticipated day 1.    Treatment initiated on 2/17/2020 with concurrent chemotherapy and radiation.  Patient received IV mitomycin 15 mg, as well as initiated Xeloda 1500 Mg twice daily on days of radiation    2/24/2020 patient returns for evaluation.  Overall, tolerating treatment fairly well, other than some mild nausea.  .  Zofran has not been beneficial.  Will prescribe Compazine 10 mg every 6 hours as needed for nausea.  We will flush her PICC line today and change her dressing.  Patient will return in 1 week for reevaluation.    3/10/2020 patient returns for evaluation, starting to have significant discomfort in her anorectal region, particularly with bowel movements and sitting.  She does not have pain medication.  She is reluctant to try pain medication, but is willing to try something particularly to help her sleep at night.  We will prescribe tramadol 1 every 6 hours as needed for pain.    3/17/2020 Worsening pain. Tramadol only helping for about 2 hours.  After discussion and review with Dr. Gray, we will prescribe Norco 5/325 1 to 2 tablets every 6 hours as needed for pain.  I have warned her that this can cause issues with constipation.  Also, we will go ahead and remove her PICC line today, as her counts have remained stable.    Patient reassessed March 23, 2020, Norco more effective though she is having urinary spasm.  We discussed completing treatment and arranging for her reassessments approximately 6 weeks from now including radiologic as well as colorectal subsequent reviews.    PLAN:  1. Continue Xeloda 1500 mg twice daily on days of radiation.  This should be completed by the end of this week-March 27, 2020.  2. Pyridium 2 mg tabs,  #30-E scribed to pharmacy to be used 1-2 times daily for urinary spasm.  The patient seems radiation therapy March 24 at which time we hope to see some improvement with this approach.  3. Norco 5/325 1 to 2 tablets every 6 hours as needed for pain control to continue  4. Patient be seen by Dr. Dara Stack in follow-up May 12  5. We will schedule PET/CT approximately May 6 so this should be available for all to review  6. The patient be seen back in office the third week of May 2020 to discuss her findings.  7. Long discussion about maintaining the patient away from the office as she completes treatment in the epidemic unfolds further.  Thus the above plan.

## 2020-03-24 ENCOUNTER — RADIATION ONCOLOGY WEEKLY ASSESSMENT (OUTPATIENT)
Dept: RADIATION ONCOLOGY | Facility: HOSPITAL | Age: 80
End: 2020-03-24

## 2020-03-24 DIAGNOSIS — C21.0 ANAL CANCER (HCC): Primary | ICD-10-CM

## 2020-03-24 PROCEDURE — 77336 RADIATION PHYSICS CONSULT: CPT | Performed by: RADIOLOGY

## 2020-03-24 PROCEDURE — 77386: CPT | Performed by: RADIOLOGY

## 2020-03-24 PROCEDURE — 77386 CHG INTENSITY MODULATED RADIATION TX DLVR COMPLEX: CPT | Performed by: RADIOLOGY

## 2020-03-24 PROCEDURE — 77427 RADIATION TX MANAGEMENT X5: CPT | Performed by: RADIOLOGY

## 2020-03-24 PROCEDURE — 77014 CHG CT GUIDANCE RADIATION THERAPY FLDS PLACEMENT: CPT | Performed by: RADIOLOGY

## 2020-03-24 NOTE — PROGRESS NOTES
Physician Weekly Management Note    Diagnosis:     Diagnosis Plan   1. Anal cancer (CMS/HCC)         RT Details:  fx 27/30 anus/pelvis    Notes on Treatment course, Films, Medical progress:  Had some dysuria yesterday and spasms, started on pyridium by dr. Gray, but did npt start it until today, I also instructed her to take hydrocodone bid and let us know if the pyridium helps    Weekly Management:  Medication reviewed?   Yes  New medications given?   No  Problemlist reviewed?   Yes  Problem added?   No  Issues raised requiring referral to support services - task assigned to:  kartik    Technical aspects reviewed:  Weekly OBI approved?   Yes  Weekly port films approved?   Yes  Change requests noted on port film?   No  Patient setup and plan reviewed?   Yes    Chart Reviewed:  Continue current treatment plan?   Yes  Treatment plan change requested?   No  CBC reviewed?   Yes  Concurrent Chemo?   Yes    Objective     Toxicities:   Dysuria grade 2     Review of Systems   Constitutional: Positive for fatigue.   Gastrointestinal: Positive for rectal pain.   Genitourinary: Positive for dysuria.          There were no vitals filed for this visit.    Current Status 3/23/2020   ECOG score 0       Physical Exam   Constitutional: She appears well-developed and well-nourished.   Abdominal: Soft.   Genitourinary:   Genitourinary Comments: Perianal erythema, no skin breakdown           Problem Summary List    Diagnosis:     Diagnosis Plan   1. Anal cancer (CMS/HCC)       Pathology:   Anal squamous cell ca    Past Medical History:   Diagnosis Date   • Anal cancer (CMS/HCC) 01/2020    SQUAMOUS CELL CARCINOMA, FOLLOWED BY DR. JOVITA BAUTISTA   • Aortic atherosclerosis (CMS/HCC)    • Arthritis    • Boil    • Chronic constipation    • Colon polyps    • Depression    • Encounter for screening mammogram for breast cancer     09/2014, 10/2014, 11/2015, 11/2016, 11/2017, 11/2018, 11/2019.   • Exudative senile macular retinal degeneration  (CMS/HCC)    • GERD (gastroesophageal reflux disease)     FOLLOWED BY DR. 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         Past Surgical History:   Procedure Laterality Date   • COLONOSCOPY W/ POLYPECTOMY N/A 01/03/2020    LARGE MASS IN ANORECTAL AREA, BX: SQUAMOUS CELL CARCINOMA, 3 TUBULAR ADENOMA POLYPS IN ASCENDING, 3 TUBULAR ADENOMA POLYPS IN TRANSVERSE, MELANOSIS COLI, DR. IMTIAZ SELLERS AT [facility]    • ENDOSCOPY N/A 01/03/2020    DR. IMTIAZ SELLERS AT [facility]   • HYSTERECTOMY N/A 1993         Current Outpatient Medications on File Prior to Visit   Medication Sig Dispense Refill   • aspirin 81 MG EC tablet Take 81 mg by mouth Daily.     • capecitabine (XELODA) 500 MG chemo tablet Take 3 tablets by mouth in the morning and 3 tablets by mouth in the evening on Monday-Friday with radiation. 60 tablet 0   • Cranberry 250 MG tablet Take  by mouth.     • diphenoxylate-atropine (LOMOTIL) 2.5-0.025 MG per tablet Take 2 tablets by mouth 2 (Two) Times a Day for 30 days. 2 tablets four times a day  tablet 0   • HYDROcodone-acetaminophen (NORCO) 5-325 MG per tablet Take 1-2 tablets by mouth Every 6 (Six) Hours As Needed for Moderate Pain . 60 tablet 0   • lisinopril (PRINIVIL,ZESTRIL) 10 MG tablet      • Multiple Vitamins-Minerals (MULTIVITAMIN ADULT) tablet Take  by mouth.     • Multiple Vitamins-Minerals (PRESERVISION AREDS 2 PO) Take  by mouth.     • ofloxacin (OCUFLOX) 0.3 % ophthalmic solution      • ondansetron (ZOFRAN) 8 MG tablet Take 1 tablet by mouth 3 (Three) Times a Day As Needed for Nausea or Vomiting. 30 tablet 5   • pantoprazole (PROTONIX) 40 MG EC tablet      • phenazopyridine (Pyridium) 200 MG tablet 1 tab  daily or BID after meals 30 tablet 1   • pravastatin (PRAVACHOL) 40 MG tablet      • prochlorperazine (COMPAZINE) 10 MG tablet Take 1 tablet by mouth Every 6 (Six) Hours As Needed for Nausea or Vomiting. 40 tablet 2   • traMADol (ULTRAM) 50 MG tablet Take 1 tablet by mouth Every 6 (Six) Hours As Needed for Moderate Pain . 40 tablet 0   • vitamin C (ASCORBIC ACID) 500 MG tablet Take 500 mg by mouth Daily.     • vitamin E 400 UNIT capsule Take 400 Units by mouth Daily.       No current facility-administered medications on file prior to visit.        Allergies   Allergen Reactions   • Codeine Rash   • Lipitor [Atorvastatin] Rash         Referring Provider:    No referring provider defined for this encounter.    Oncologist:  No primary care provider on file.      Seen and approved by:  Mariaan Beyer MD  03/24/2020

## 2020-03-25 ENCOUNTER — MEDICATION THERAPY MANAGEMENT (OUTPATIENT)
Dept: PHARMACY | Facility: HOSPITAL | Age: 80
End: 2020-03-25

## 2020-03-25 PROCEDURE — 77386 CHG INTENSITY MODULATED RADIATION TX DLVR COMPLEX: CPT | Performed by: RADIOLOGY

## 2020-03-25 PROCEDURE — 77014 CHG CT GUIDANCE RADIATION THERAPY FLDS PLACEMENT: CPT | Performed by: RADIOLOGY

## 2020-03-25 PROCEDURE — 77386: CPT | Performed by: RADIOLOGY

## 2020-03-25 NOTE — PROGRESS NOTES
MTM telephone encounter re adherence and side effects ( Capecitabine)    Pt stated the pyrdium had greatly relieved her urinary spasms.  She continues to report constipation.  She had bowel movement this am, but reported it was small.  Pt was counseled to take 2 Senokot S tablets tonight after coming home from radiation and to repeat in the am if necessary, then to add Milk of Mag if no BM.

## 2020-03-26 PROCEDURE — 77386: CPT | Performed by: RADIOLOGY

## 2020-03-26 PROCEDURE — 77386 CHG INTENSITY MODULATED RADIATION TX DLVR COMPLEX: CPT | Performed by: RADIOLOGY

## 2020-03-26 PROCEDURE — 77014 CHG CT GUIDANCE RADIATION THERAPY FLDS PLACEMENT: CPT | Performed by: RADIOLOGY

## 2020-03-27 ENCOUNTER — MEDICATION THERAPY MANAGEMENT (OUTPATIENT)
Dept: PHARMACY | Facility: HOSPITAL | Age: 80
End: 2020-03-27

## 2020-03-27 ENCOUNTER — DOCUMENTATION (OUTPATIENT)
Dept: RADIATION ONCOLOGY | Facility: CLINIC | Age: 80
End: 2020-03-27

## 2020-03-27 DIAGNOSIS — C21.0 ANAL CANCER (HCC): Primary | ICD-10-CM

## 2020-03-27 PROCEDURE — 77386 CHG INTENSITY MODULATED RADIATION TX DLVR COMPLEX: CPT | Performed by: RADIOLOGY

## 2020-03-27 PROCEDURE — 77386: CPT | Performed by: RADIOLOGY

## 2020-03-27 PROCEDURE — 77014 CHG CT GUIDANCE RADIATION THERAPY FLDS PLACEMENT: CPT | Performed by: RADIOLOGY

## 2020-03-27 NOTE — PROGRESS NOTES
MTM telephone encounter re adherence and side effects ( Capcitabine)    Pt has had a bowel movement this am ( follow up from discussion on 3/23).  She was nauseated this am and struggled to drink water before radiation, but managed.  She had no other concerns today.

## 2020-04-03 DIAGNOSIS — C21.0 ANAL CANCER (HCC): ICD-10-CM

## 2020-04-03 RX ORDER — HYDROCODONE BITARTRATE AND ACETAMINOPHEN 5; 325 MG/1; MG/1
1-2 TABLET ORAL EVERY 6 HOURS PRN
Qty: 60 TABLET | Refills: 0 | Status: CANCELLED | OUTPATIENT
Start: 2020-04-03

## 2020-04-03 NOTE — TELEPHONE ENCOUNTER
Pt  called requesting refill of pt HYDROcodone-acetaminophen 5-325 MG tablets to be sent to [facility] ([phone])..    Pt has 17 tablets left.    Call [phone] when prescription is ready

## 2020-04-06 RX ORDER — HYDROCODONE BITARTRATE AND ACETAMINOPHEN 5; 325 MG/1; MG/1
1-2 TABLET ORAL EVERY 6 HOURS PRN
Qty: 120 TABLET | Refills: 0 | Status: ON HOLD | OUTPATIENT
Start: 2020-04-06 | End: 2020-08-07

## 2020-04-10 ENCOUNTER — TELEPHONE (OUTPATIENT)
Dept: ONCOLOGY | Facility: CLINIC | Age: 80
End: 2020-04-10

## 2020-04-10 NOTE — TELEPHONE ENCOUNTER
Since she had chemo 2 weeks ago she can't eat, can't keep anything down, weak, can't walk by herself.      By Celio, Shanna DEMARCO            RN has tried to return this call multiple times.  I have called all numbers listed in chart and I'm not able to get through.  Was able to leave a voicemail on husbands cell phone.  Home phone is not working.

## 2020-04-15 ENCOUNTER — TELEPHONE (OUTPATIENT)
Dept: ONCOLOGY | Facility: HOSPITAL | Age: 80
End: 2020-04-15

## 2020-04-15 DIAGNOSIS — R63.0 POOR APPETITE: Primary | ICD-10-CM

## 2020-04-15 RX ORDER — PREDNISONE 10 MG/1
10 TABLET ORAL DAILY
Qty: 30 TABLET | Refills: 1 | Status: ON HOLD | OUTPATIENT
Start: 2020-04-15 | End: 2020-08-07

## 2020-04-15 NOTE — TELEPHONE ENCOUNTER
Could not get ahold of pt on remote line. Called and s/w LIANA wiseman in office who will attempt to call pt.

## 2020-04-15 NOTE — TELEPHONE ENCOUNTER
PT. STATES SHE FINISHED HER CHEMO AND RT ON 3/27/20.  CONTINUES TO HAVE SOME NAUSEA PRETTY MUCH EVERY DAY.  DENIES VOMITING.  NO FEVER OR CHILLS.  DENIES ABD PAIN.  REPORTS HAVING A GOOD BOWEL MOVEMENT THIS MORNING.  IS USING THE COMPAZINE ONLY, BUT FEELS IT DOESN'T HELP THAT MUCH.  PT. STOPPED THE ZOFRAN BECAUSE SHE FELT IT WASN'T WORKING FOR HER.  PT. DENIES BEING OUT IN PUBLIC AROUND PEOPLE.  SHE HAS HAD NO VISITORS AT HER HOME, SO THEREFORE NO EXPOSURE TO THE VIRUS.   DENIES THE LACK OF SMELL OR TASTE.  THE OTHER BIG PROBLEM SHE IS HAVING IS NO APPETITE.  CAN'T THINK OF ONE THING THAT SOUNDS GOOD TO HER.  DRINKING FLUIDS FAIR, BUT COULD IMPROVE ON THIS.  INFORMED PT. SHE NEEDS TO TRY AND GET IN  AT LEAST 6 GLASSES OF LQD IN A DAY, ANY KIND OF LQD WOULD BE FINE.  IS AWARE THAT CAFFEINE CAN BE DEHYDRATING.  PT. ADVISED TO START BACK ON THE ZOFRAN AND ALTERNATE THE ZOFRAN WITH THE COMPAZINE SO SHE CAN HAVE A ANTIEMETIC IN HER SYSTEM MOST OF THE TIME.  UNDERSTANDING NOTED.  D/W DR. ARITA CONCERNS OF POOR APPETITE.  WILL SEND PT. PREDNISONE 10MG DAILY IN THE AM WITH FOOD.  PT. ALSO INSTRUCTED TO TAKE HER PROTONIX  1 HOUR PRIOR TO TAKING THE PREDNISONE WITH FOOD.  PT. INSTRUCTED TO CALL BACK ON Monday 4/20/20 WITH A UPDATE ON HER APPETITE.   STATES SHE WILL KEEP NOTES AND CALL ON Monday.  PT. IS AWARE TO CALL IF SHE HAS ANY WORSENING SYMPTOMS IN THE INTERIM.

## 2020-04-21 ENCOUNTER — TELEPHONE (OUTPATIENT)
Dept: ONCOLOGY | Facility: CLINIC | Age: 80
End: 2020-04-21

## 2020-04-21 NOTE — TELEPHONE ENCOUNTER
Wants to speak to nurse about Prednisone doctor had ordered as it made her sick. Her callback is [phone]

## 2020-04-21 NOTE — TELEPHONE ENCOUNTER
"PT. STATES SHE TOOK THE PREDNISONE FOR HER APPETITE FOR 2 DAYS AND THEN SHE STOPPED IT BECAUSE IT CAUSED N/V, DIARRHEA AND MADE \"ME SO SICK.\"   STATES THE SYMPTOMS LASTED FOR 2 DAYS AFTER SHE STOPPED THE MED.  IS NOW GETTING BACK TO \"NORMAL.\"    STATES,  \"I WILL GET MY APPETITE BACK ON MY OWN.\"    PT. INSTRUCTED TO TO CALL IF WE CAN HELP HER WITH ANYTHING ELSE.  UNDERSTANDING NOTED.  "

## 2020-04-29 ENCOUNTER — OFFICE VISIT (OUTPATIENT)
Dept: SURGERY | Facility: CLINIC | Age: 80
End: 2020-04-29

## 2020-04-29 VITALS
WEIGHT: 136.4 LBS | HEIGHT: 61 IN | HEART RATE: 98 BPM | SYSTOLIC BLOOD PRESSURE: 140 MMHG | TEMPERATURE: 97.4 F | OXYGEN SATURATION: 95 % | DIASTOLIC BLOOD PRESSURE: 76 MMHG | BODY MASS INDEX: 25.75 KG/M2

## 2020-04-29 DIAGNOSIS — C21.0 ANAL SQUAMOUS CELL CARCINOMA (HCC): Primary | ICD-10-CM

## 2020-04-29 PROCEDURE — 99212 OFFICE O/P EST SF 10 MIN: CPT | Performed by: COLON & RECTAL SURGERY

## 2020-04-29 NOTE — PROGRESS NOTES
"Benita Garcia is a 79 y.o. female in for follow up of Anal squamous cell carcinoma (CMS/HCC)  T2N0 diagnosed January 2020    She finished chemoradiation March 27 2020.  Pt states her  looked at her bottom, and he said that the tumor is smaller now.    No blood per rectum  She does sometimes feel sore when she has a BM    Her BMs are irregular.  Sometimes she skips a day, sometimes she has several BMs per day.  She is not taking a fiber supplement    Appetite is low    /76 (BP Location: Left arm, Patient Position: Sitting, Cuff Size: Adult)   Pulse 98   Temp 97.4 °F (36.3 °C) (Temporal)   Ht 154.9 cm (61\")   Wt 61.9 kg (136 lb 6.4 oz)   LMP  (LMP Unknown)   SpO2 95%   Breastfeeding No   BMI 25.77 kg/m²   Body mass index is 25.77 kg/m².      PE:  Physical Exam   Constitutional: She appears well-developed. No distress.   HENT:   Head: Normocephalic and atraumatic.   Abdominal: Soft. She exhibits no distension.   Genitourinary:   Genitourinary Comments: Perianal exam: external: right posterior tumor with dramatic improvement; nearly resolved externally. +circumferential hypopigmented skin with thick yellow mucus drainage and skin irritation.  TEGAN- decreased tone, right posterior 2cm mass   Musculoskeletal: Normal range of motion.   Neurological: She is alert.   Psychiatric: Thought content normal.         Assessment:   1. Anal squamous cell carcinoma (CMS/HCC)    T2N0 diagnosed January 2020 s/p chemoradiation March 27 2020    Plan:    Pt with significant improvement in her tumor s/p chemoradiation; will reassess at next visit.  If any residual abnormality, will plan to biopsy at that time.    To help improve her BMs, I recommend fiber therapy and detailed and gave written instructions on how to achieve a high fiber diet.  For her perianal skin, recommend otc barrier cream prn.    Call or come in if any questions or concerning symptoms    RTC 6 weeks      Scribed for Barry Stack MD by " Mayte Downey PA-C  4/29/2020   This patient was evaluated by me, recommendations made, documentation reviewed, edited, and revised by me, Barry Stack MD

## 2020-06-10 ENCOUNTER — OFFICE VISIT (OUTPATIENT)
Dept: SURGERY | Facility: CLINIC | Age: 80
End: 2020-06-10

## 2020-06-10 VITALS
WEIGHT: 133.9 LBS | TEMPERATURE: 98.4 F | BODY MASS INDEX: 25.3 KG/M2 | DIASTOLIC BLOOD PRESSURE: 75 MMHG | OXYGEN SATURATION: 90 % | SYSTOLIC BLOOD PRESSURE: 129 MMHG | HEART RATE: 100 BPM

## 2020-06-10 DIAGNOSIS — C21.0 ANAL SQUAMOUS CELL CARCINOMA (HCC): Primary | ICD-10-CM

## 2020-06-10 PROCEDURE — 46600 DIAGNOSTIC ANOSCOPY SPX: CPT | Performed by: COLON & RECTAL SURGERY

## 2020-06-10 PROCEDURE — 99212 OFFICE O/P EST SF 10 MIN: CPT | Performed by: COLON & RECTAL SURGERY

## 2020-06-10 NOTE — PROGRESS NOTES
Benita Garcia is a 79 y.o. female in for follow up of Anal squamous cell carcinoma (CMS/HCC)  T2N0 diagnosed January 2020 s/p chemoradiation March 27 2020    Pt states her BMs are going well.  She does have some drainage; no bleeding    She has PET per Dr. Gray soon    /75 (BP Location: Left arm, Patient Position: Sitting, Cuff Size: Adult)   Pulse 100   Temp 98.4 °F (36.9 °C) (Oral)   Wt 60.7 kg (133 lb 14.4 oz)   LMP  (LMP Unknown)   SpO2 90%   BMI 25.30 kg/m²   Body mass index is 25.3 kg/m².      PE:  Physical Exam   Constitutional: She appears well-developed. No distress.   HENT:   Head: Normocephalic and atraumatic.   Abdominal: Soft. She exhibits no distension.   Genitourinary:   Genitourinary Comments: Perianal exam: external hem - no residual tumor  TEGAN- stable decreased tone, no masses  Anoscopy performed: no tumor noted   Musculoskeletal: Normal range of motion.   Neurological: She is alert.   Psychiatric: Thought content normal.       Assessment:   1. Anal squamous cell carcinoma (CMS/HCC)     T2N0 diagnosed January 2020 s/p chemoradiation March 27 2020    Plan:    Discussion with pt no residual tumor noted on exam.  Continue close surveillance with anoscopy.      RTC 3 months    Scribed for Barry Stack MD by Mayte Downey PA-C  6/10/2020   This patient was evaluated by me, recommendations made, documentation reviewed, edited, and revised by me, Barry Stack MD

## 2020-06-11 ENCOUNTER — HOSPITAL ENCOUNTER (OUTPATIENT)
Dept: PET IMAGING | Facility: HOSPITAL | Age: 80
Discharge: HOME | End: 2020-06-11
Admitting: INTERNAL MEDICINE

## 2020-06-11 ENCOUNTER — HOSPITAL ENCOUNTER (OUTPATIENT)
Dept: PET IMAGING | Facility: HOSPITAL | Age: 80
Discharge: HOME | End: 2020-06-11

## 2020-06-11 DIAGNOSIS — C21.1 MALIGNANT NEOPLASM OF ANAL CANAL (HCC): ICD-10-CM

## 2020-06-11 DIAGNOSIS — C21.0 ANAL CANCER (HCC): ICD-10-CM

## 2020-06-11 LAB — GLUCOSE BLDC GLUCOMTR-MCNC: 122 MG/DL (ref 70–130)

## 2020-06-11 PROCEDURE — 82962 GLUCOSE BLOOD TEST: CPT

## 2020-06-16 ENCOUNTER — HOSPITAL ENCOUNTER (OUTPATIENT)
Dept: PET IMAGING | Facility: HOSPITAL | Age: 80
Discharge: HOME | End: 2020-06-16

## 2020-06-16 ENCOUNTER — HOSPITAL ENCOUNTER (OUTPATIENT)
Dept: PET IMAGING | Facility: HOSPITAL | Age: 80
Discharge: HOME | End: 2020-06-16
Admitting: INTERNAL MEDICINE

## 2020-06-16 LAB — GLUCOSE BLDC GLUCOMTR-MCNC: 92 MG/DL (ref 70–130)

## 2020-06-16 PROCEDURE — A9552 F18 FDG: HCPCS | Performed by: INTERNAL MEDICINE

## 2020-06-16 PROCEDURE — 0 FLUDEOXYGLUCOSE F18 SOLUTION: Performed by: INTERNAL MEDICINE

## 2020-06-16 PROCEDURE — 78815 PET IMAGE W/CT SKULL-THIGH: CPT

## 2020-06-16 PROCEDURE — 82962 GLUCOSE BLOOD TEST: CPT

## 2020-06-16 RX ADMIN — FLUDEOXYGLUCOSE F18 1 DOSE: 300 INJECTION INTRAVENOUS at 13:02

## 2020-06-18 ENCOUNTER — LAB (OUTPATIENT)
Dept: LAB | Facility: HOSPITAL | Age: 80
End: 2020-06-18

## 2020-06-18 ENCOUNTER — OFFICE VISIT (OUTPATIENT)
Dept: ONCOLOGY | Facility: CLINIC | Age: 80
End: 2020-06-18

## 2020-06-18 VITALS
BODY MASS INDEX: 25.49 KG/M2 | WEIGHT: 135 LBS | RESPIRATION RATE: 18 BRPM | HEART RATE: 97 BPM | TEMPERATURE: 97.3 F | DIASTOLIC BLOOD PRESSURE: 76 MMHG | OXYGEN SATURATION: 94 % | HEIGHT: 61 IN | SYSTOLIC BLOOD PRESSURE: 133 MMHG

## 2020-06-18 DIAGNOSIS — C21.0 ANAL CANCER (HCC): ICD-10-CM

## 2020-06-18 DIAGNOSIS — C21.0 ANAL CANCER (HCC): Primary | ICD-10-CM

## 2020-06-18 LAB
BASOPHILS # BLD AUTO: 0.07 10*3/MM3 (ref 0–0.2)
BASOPHILS NFR BLD AUTO: 0.5 % (ref 0–1.5)
DEPRECATED RDW RBC AUTO: 47.6 FL (ref 37–54)
EOSINOPHIL # BLD AUTO: 0.24 10*3/MM3 (ref 0–0.4)
EOSINOPHIL NFR BLD AUTO: 1.7 % (ref 0.3–6.2)
ERYTHROCYTE [DISTWIDTH] IN BLOOD BY AUTOMATED COUNT: 12.3 % (ref 12.3–15.4)
HCT VFR BLD AUTO: 35 % (ref 34–46.6)
HGB BLD-MCNC: 11 G/DL (ref 12–15.9)
IMM GRANULOCYTES # BLD AUTO: 0.06 10*3/MM3 (ref 0–0.05)
IMM GRANULOCYTES NFR BLD AUTO: 0.4 % (ref 0–0.5)
LYMPHOCYTES # BLD AUTO: 2.15 10*3/MM3 (ref 0.7–3.1)
LYMPHOCYTES NFR BLD AUTO: 15.5 % (ref 19.6–45.3)
MCH RBC QN AUTO: 33 PG (ref 26.6–33)
MCHC RBC AUTO-ENTMCNC: 31.4 G/DL (ref 31.5–35.7)
MCV RBC AUTO: 105.1 FL (ref 79–97)
MONOCYTES # BLD AUTO: 1.02 10*3/MM3 (ref 0.1–0.9)
MONOCYTES NFR BLD AUTO: 7.4 % (ref 5–12)
NEUTROPHILS # BLD AUTO: 10.32 10*3/MM3 (ref 1.7–7)
NEUTROPHILS NFR BLD AUTO: 74.5 % (ref 42.7–76)
NRBC BLD AUTO-RTO: 0 /100 WBC (ref 0–0.2)
PLATELET # BLD AUTO: 342 10*3/MM3 (ref 140–450)
PMV BLD AUTO: 9.3 FL (ref 6–12)
RBC # BLD AUTO: 3.33 10*6/MM3 (ref 3.77–5.28)
WBC NRBC COR # BLD: 13.86 10*3/MM3 (ref 3.4–10.8)

## 2020-06-18 PROCEDURE — 36415 COLL VENOUS BLD VENIPUNCTURE: CPT

## 2020-06-18 PROCEDURE — 99214 OFFICE O/P EST MOD 30 MIN: CPT | Performed by: INTERNAL MEDICINE

## 2020-06-18 PROCEDURE — 85025 COMPLETE CBC W/AUTO DIFF WBC: CPT

## 2020-06-18 NOTE — PROGRESS NOTES
Subjective  Patient still with perirectal pain treated with Aquaphor successfully.    REASON FOR CONSULTATION: Anal carcinoma     REQUESTING PHYSICIAN: Rosa Christianson MD, Zen Franco MD    History of Present Illness patient is a 79-year-old female with the above-mentioned history .     The patient is a 79-year-old female followed (according to record) by primary care with depression, pulmonary hypertension, macular degeneration and retinal degeneration as well as aortic atherosclerosis.  She was seen December 10, 2019 by primary care plans to continue aspirin, lisinopril, pantoprazole and pravastatin.She had been noting bright red blood per rectum since the fall, 2019 with the presumption that she had hemorrhoidal bleeding.  She also indicates that she has been chronically constipated now requiring a laxative on a regular basis.  She has been tested previously with hemoccult but requested that this was not going to be helpful with her symptoms.  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     Review of the procedure note by Dr. eZn Franco January 3, 2020 indicates that there is a large mass at the anorectal area likely in the distal rectum, cauliflower with ulceration multiple biopsies taken. She has not been evaluated by colorectal surgery thus far.  The patient requested assessment and [facility] and is seen with her  and granddaughter.  She has not had weight loss, night sweats, fever, chills though has recognized change in bowel function as described above with bright red blood per rectum for several months.     The patient was assessed January 27 and felt to have an apparent anal carcinoma that  extended into the rectum.  As result she was asked to undergo PET/CT scanning and assessment by colorectal surgery.  PET/CT demonstrated the primary tumor as an oblong tumor involving the 4 inferior SPECT SPECT the rectum measuring 5 cm x 3.6 cm with intense hypermetabolism at 33.1.  There is no evidence of disease within the abdomen pelvis with mild hypermetabolism with borderline enlarged bilateral hilar and subcarinal lymph nodes with SUV up to 6.0.  These are suspected to be inflammatory in nature.  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  The patient is now scheduled to be seen by radiation therapy on February 7, 2001.     The patient is seen back February 4, 2020 and we have again discussed concurrent chemoradiotherapy rather than surgery using concurrent 5-FU (in this case capecitabine)and mitomycin.  This is a combination of 10 mg/m²-maximum 15 mg IV of mitomycin given through a free-flowing line, capecitabine is 825 mg meter squared on radiation days through the completion of radiation therapy.  This is been discussed in detail with she and her  of approximately 45 minutes therapy for 2020.  We discussed assessing her for Xeloda toxicity, have her undergo teaching for the combination treatment, subsequently scheduling a PICC line, and to proceed to radiation therapy consultation as scheduled on February 7.  We are hopeful that we can start within the next week.   The patient went on to undergo teaching, had a PICC line placed February 11, 2020 and has been seen by radiation therapy with treatment to begin February 17, 2020.  She is seen back with her  and we discussed the overall plan in detail.  The patient went on to complete her therapy been seen March 23, 2020 completing her Xeloda and radiation therapy by April 27..   April 29 she had assessment by Dr. Stack with examination demonstrating dramatic improvement in the right posterior tumor nearly resolved externally.  As result of the epidemic her PET/CT was delayed until June 16, 2020 demonstrating significant decrease in the uptake within the distal rectum and anus and no findings of metastatic disease in the chest abdomen pelvis.    Additionally and importantly moderate to intense mediastinal bilateral hilar adenopathy is grossly unchanged from January 2020 thought to be reactive.The patient also been seen by Dr. Stack again Martha 15, 2020 with anoscopy performed and no tumor noted.     The patient is next evaluated June 18, 2020 indicating that she still has perirectal pain treated primarily, and most effectively, with Aquaphor.  Otherwise she feels well and is quite pleased about her results.  Past Medical History:   Diagnosis Date   • Anal cancer (CMS/HCC) 01/2020    SQUAMOUS CELL CARCINOMA, FOLLOWED BY DR. JOVITA STACK   • Aortic atherosclerosis (CMS/HCC)    • Arthritis    • Boil    • Chronic constipation    • Colon polyps     FOLLOWED BY DR. JOVITA STACK   • Depression    • Encounter for screening mammogram for breast cancer     09/2014, 10/2014, 11/2015, 11/2016, 11/2017, 11/2018, 11/2019.   • Exudative senile macular retinal degeneration (CMS/HCC)    • GERD (gastroesophageal reflux disease)     FOLLOWED BY DR. IMTIAZ SELLERS   • Hemorrhoids    • History of chemotherapy 2020    FOR ANAL CANCER, FOLLOWED BY DR. KASHMIR ARITA   • History of radiation therapy 2020    FOLLOWED BY DR. 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       Past Surgical History:   Procedure Laterality Date   • COLONOSCOPY W/ POLYPECTOMY N/A 01/03/2020     LARGE MASS IN ANORECTAL AREA, BX: SQUAMOUS CELL CARCINOMA, 3 TUBULAR ADENOMA POLYPS IN ASCENDING, 3 TUBULAR ADENOMA POLYPS IN TRANSVERSE, MELANOSIS COLI, DR. IMTIAZ SELLERS AT [facility]    • ENDOSCOPY N/A 01/03/2020    DR. IMTIAZ SELLERS AT [facility]   • HYSTERECTOMY N/A 1993        Current Outpatient Medications on File Prior to Visit   Medication Sig Dispense Refill   • aspirin 81 MG EC tablet Take 81 mg by mouth Daily.     • capecitabine (XELODA) 500 MG chemo tablet Take 3 tablets by mouth in the morning and 3 tablets by mouth in the evening on Monday-Friday with radiation. 60 tablet 0   • Cranberry 250 MG tablet Take  by mouth.     • HYDROcodone-acetaminophen (NORCO) 5-325 MG per tablet Take 1-2 tablets by mouth Every 6 (Six) Hours As Needed for Moderate Pain . 120 tablet 0   • lisinopril (PRINIVIL,ZESTRIL) 10 MG tablet      • Multiple Vitamins-Minerals (MULTIVITAMIN ADULT) tablet Take  by mouth.     • Multiple Vitamins-Minerals (PRESERVISION AREDS 2 PO) Take  by mouth.     • ofloxacin (OCUFLOX) 0.3 % ophthalmic solution      • ondansetron (ZOFRAN) 8 MG tablet Take 1 tablet by mouth 3 (Three) Times a Day As Needed for Nausea or Vomiting. 30 tablet 5   • pantoprazole (PROTONIX) 40 MG EC tablet      • phenazopyridine (Pyridium) 200 MG tablet 1 tab daily or BID after meals 30 tablet 1   • pravastatin (PRAVACHOL) 40 MG tablet      • predniSONE (DELTASONE) 10 MG tablet Take 1 tablet by mouth Daily. TAKE WITH FOOD 30 tablet 1   • prochlorperazine (COMPAZINE) 10 MG tablet Take 1 tablet by mouth Every 6 (Six) Hours As Needed for Nausea or Vomiting. 40 tablet 2   • traMADol (ULTRAM) 50 MG tablet Take 1 tablet by mouth Every 6 (Six) Hours As Needed for Moderate Pain . 40 tablet 0   • vitamin C (ASCORBIC ACID) 500 MG tablet Take 500 mg by mouth Daily.     • vitamin E 400 UNIT capsule Take 400 Units by mouth Daily.       No current facility-administered medications on file prior to visit.          ALLERGIES:    Allergies   Allergen Reactions   • Codeine Rash   • Lipitor [Atorvastatin] Rash        Social History     Socioeconomic History   • Marital status:      Spouse name: Dimitri   • Number of children: 2   • Years of education: High school   • Highest education level: Not on file   Occupational History     Employer: RETIRED   Tobacco Use   • Smoking status: Former Smoker     Packs/day: 0.50     Years: 40.00     Pack years: 20.00     Types: Cigarettes     Start date: 10/31/2004   • Smokeless tobacco: Never Used   Substance and Sexual Activity   • Alcohol use: Not Currently     Frequency: Never   • Drug use: Never   • Sexual activity: Yes     Partners: Male     Birth control/protection: Post-menopausal, Surgical     Comment: .        Family History   Problem Relation Age of Onset   • Cancer Sister         Lung Cancer   • Lung cancer Sister 61   • Brain cancer Sister    • Hypertension Brother    • Heart disease Brother    • Cancer Sister         Brain Cancer   • Lung cancer Sister 72   • Heart disease Father    • Cancer Sister    • Lung cancer Sister    • Cancer Sister    • Lung cancer Sister         Review of Systems   Constitutional: Negative for activity change, appetite change, chills, fatigue and fever.   HENT: Negative for mouth sores, nosebleeds and trouble swallowing.    Respiratory: Negative for cough and shortness of breath.    Cardiovascular: Negative for chest pain and leg swelling.   Gastrointestinal: Positive for rectal pain. Negative for abdominal pain, diarrhea, nausea and vomiting.   Genitourinary: Negative for difficulty urinating, pelvic pain and urgency.   Skin: Negative for rash.   Neurological: Negative for dizziness, weakness and numbness.   Hematological: Negative for adenopathy. Does not bruise/bleed easily.   Psychiatric/Behavioral: Negative for sleep disturbance.   3/17/2020 ROS unchanged from above except as updated.     Objective     Vitals:    06/18/20 1440   BP:  "133/76   Pulse: 97   Resp: 18   Temp: 97.3 °F (36.3 °C)   TempSrc: Temporal   SpO2: 94%   Weight: 61.2 kg (135 lb)   Height: 154.9 cm (60.98\")   PainSc: 0-No pain     Current Status 6/18/2020   ECOG score 0       Physical Exam   Constitutional: She is oriented to person, place, and time. She appears well-developed and well-nourished.   HENT:   Mouth/Throat: Mucous membranes are normal. No oropharyngeal exudate.   Eyes: Pupils are equal, round, and reactive to light.   Neck: Normal range of motion.   Pulmonary/Chest: She has no rhonchi.   Abdominal: Normal appearance. There is no hepatosplenomegaly.   Musculoskeletal: Normal range of motion. She exhibits no edema.   Neurological: She is alert and oriented to person, place, and time.   Skin: Skin is warm and dry. No rash noted.   Psychiatric: She has a normal mood and affect.   Vitals reviewed.      RECENT LABS:  Hematology WBC   Date Value Ref Range Status   06/18/2020 13.86 (H) 3.40 - 10.80 10*3/mm3 Final     RBC   Date Value Ref Range Status   06/18/2020 3.33 (L) 3.77 - 5.28 10*6/mm3 Final     Hemoglobin   Date Value Ref Range Status   06/18/2020 11.0 (L) 12.0 - 15.9 g/dL Final     Hematocrit   Date Value Ref Range Status   06/18/2020 35.0 34.0 - 46.6 % Final     Platelets   Date Value Ref Range Status   06/18/2020 342 140 - 450 10*3/mm3 Final        FDG PET/CT IMAGING SKULL BASE TO MID THIGH  1/30/2020    FINDINGS: The primary neoplasm is an oblong tumor mass involving the far  inferior aspect of the rectum and the pain is that measures  approximately 5 cm in greatest cephalocaudad dimension and 3.6 cm in  greatest mediolateral dimension. It demonstrates intense hypermetabolism  with a maximum measured as she 33.1 g/mL. There is no metabolic evidence  of metastatic disease within the abdomen or pelvis. Particular, no  hypermetabolic lymphadenopathy is identified. There is physiologic  distribution of the radiopharmaceutical within the neck. Imaging of the  chest " shows mild hypermetabolism within a borderline enlarged bilateral  hilar and subcarinal lymph nodes. These have maximum SUV in the range of  6.0 g/mL. This would be a very usual location for isolated metastatic  disease from an anorectal neoplasm. These lymph nodes are favored to be  inflammatory in etiology.     IMPRESSION:  Intensely hypermetabolic neoplasm involving the far inferior  aspect of the rectum and essentially the entire anus. There is no  compelling metabolic evidence of metastatic disease within the neck,  chest, abdomen or pelvis.     This report was finalized on 1/31/2020 2:39 PM by Dr. Mayco Patel M.D.      F-18 FDG PET FROM SKULL BASE TO MID THIGH WITH PET/CT FUSION  6/16/2020  FINDINGS:  There is no cervical adenopathy demonstrating FDG uptake significantly  above that of blood pool.     The thyroid, submandibular and parotid glands demonstrate roughly  symmetric FDG uptake. There is a small hiatal hernia.     Moderate to intense bilateral hilar and mediastinal lymph nodes are  present with index nodes as below:  *  Precarinal node measuring 1.1 cm in short axis dimension with a max  SUV of 4.6, grossly unchanged since 01/30/2020.  *  Left hilar node measuring 1 cm in short axis dimension and  demonstrating a max SUV of 5.9, as before.      There is no axillary adenopathy demonstrating FDG uptake significantly  above that of blood pool.     The heart is normal in size. Extensive coronary artery calcification is  present.     There is moderate emphysema. No pulmonary consolidation, pleural  effusion or pneumothorax is present. Sub-6 mm pulmonary nodule within  the right upper lobe is stable since 01/30/2020. There are no new  suspicious FDG avid pulmonary nodules.     There are no suspicious FDG avid lesions within the liver, gallbladder,  pancreas, spleen or kidneys. Bilateral adrenal nodules measure 1.6 cm on  the left and 1.3 cm on the right, demonstrate densities less than 10  Hounsfield  units, are grossly unchanged since 01/30/2020 and does not  demonstrate FDG uptake significantly above that of the liver, likely  representing adenomas.     Cystic density lesion arising off the left kidney likely represents  simple cyst, as before. There are also left-sided parapelvic cysts. No  hydronephrosis is present. There is no abdominopelvic adenopathy  demonstrating FDG uptake significantly above that of blood pool.     There is colonic diverticulosis. The appendix is unremarkable.     Previously seen long segment of intense FDG uptake within the distal  rectum and anus has significantly decreased in FDG uptake demonstrating  a max SUV of 3.5 (previously 33.1.). The overall length of uptake has  also decreased. There is no free intraperitoneal air or fluid.     There are not suspicious lytic or blastic FDG avid osseous lesions.  Moderate anterolisthesis of L5 on S1 is present. Findings of pelvic  floor prolapse with an enterocele extending approximately 1 cm below the  pubococcygeal line. The uterus is surgically absent.     IMPRESSION:  1.  Significant decrease in extent and degree of FDG uptake within the  distal rectum and anus as above. No findings of new metastatic disease  in the chest, abdomen or pelvis.  2.  Moderate to intense mediastinal and bilateral hilar adenopathy which  are grossly unchanged since 01/30/2020 and while findings are favored be  reactive, continued attention on follow-up is recommended to ensure  stability.  3.  Findings of bilateral adrenal adenomas, as before.  4.  Other findings as above.     This report was finalized on 6/18/2020 9:34 AM by Dr. Carlos Mackey M.D.             Assessment/Plan       79-year-old female followed by primary care as described above with development over the last 4 to 5 months of bright red blood per rectum that became associated with worsening constipation.  The patient symptoms, unfortunately, progressed until she had GI assessment that revealed  a mass in the distal rectum/anal rectal region that was cauliflower in description with ulceration.  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  She has been advised that she has a rectal malignancy.                                              In discussing this with the patient and her family we discussed that a primary rectal squamous cell carcinomas is a rare disorder and one wonders whether this is not an anal carcinoma that has extended into the rectum.  They are, incidentally, treated like rectal adenocarcinomas with surgical resection but there are studies that use chemoradiotherapy.      This patient needs to be assessed by colorectal surgery and undergo additional staging endoscopically.  We have contacted Dr. Dara Stack's office and, as they review the record, have requested that the patient proceed with laboratory studies, PET/CT and colorectal surgery assessment.  Additional laboratory studies include a CEA of 3.07, no evidence of iron deficiency, CMP with total protein mildly elevated at 8.3 g/dL.  This did occur and the the patient's studies suggest T2 N0 M0-stage IIA disease.      After review by colorectal surgery the patient is felt best treated by chemoradiation therapy and she is seen with her  February 4, 2020 at which time we discussed how this can be accomplished.  The patient proceeded to laboratory for DPD testing which is currently pending as she is seen February 12, 2020.  We have obtained Xeloda at a 25 mg meter squared to 1500 mg p.o. twice daily during days of radiation therapy with mitomycin also planned - 10 mg meter squared to 15 mg dosing anticipated day 1.    Treatment initiated on 2/17/2020 with concurrent chemotherapy and radiation.  Patient received IV mitomycin 15 mg, as well as initiated Xeloda 1500 Mg twice daily on days of radiation    2/24/2020 patient returns for evaluation.  Overall, tolerating treatment fairly well, other  than some mild nausea.  .  Zofran has not been beneficial.  Will prescribe Compazine 10 mg every 6 hours as needed for nausea.  We will flush her PICC line today and change her dressing.  Patient will return in 1 week for reevaluation.    3/10/2020 patient returns for evaluation, starting to have significant discomfort in her anorectal region, particularly with bowel movements and sitting.  She does not have pain medication.  She is reluctant to try pain medication, but is willing to try something particularly to help her sleep at night.  We will prescribe tramadol 1 every 6 hours as needed for pain.    3/17/2020 Worsening pain. Tramadol only helping for about 2 hours.  After discussion and review with Dr. Gray, we will prescribe Norco 5/325 1 to 2 tablets every 6 hours as needed for pain.  I have warned her that this can cause issues with constipation.  Also, we will go ahead and remove her PICC line today, as her counts have remained stable.    Patient reassessed March 23, 2020, Norco more effective though she is having urinary spasm.  We discussed completing treatment and arranging for her reassessments approximately 6 weeks from now including radiologic as well as colorectal subsequent reviews.  The patient continue Xeloda and radiation therapy completing treatment by the end of March 2020.  She treated symptomatically thereafter and assessed periodically as possible during the COVID epidemic.  Review endoscopically in late April demonstrated further improvement and performed most recently Martha 15 also had no evidence of residual tumor endoscopically.     A follow-up PET done June 16 demonstrates a significant decrease in FDG uptake within the distal rectum and anus with no evidence of metastatic disease in the chest abdomen pelvis though there remains moderate to intense mediastinal bilateral hilar adenopathy that is unchanged from January 30 and thought to be reactive.  I have reviewed all these findings  with the patient requesting that she undergo a repeat CT scan of the chest abdomen pelvis in approximately 6 months.    PLAN:  *Schedule CT scans of chest abdomen pelvis and 23 weeks along with CBC, CMP and CEA    *24 weeks follow-up MD    *Patient agreeable to this plan and follow-up.

## 2020-07-05 PROCEDURE — G9678 ONCOLOGY CARE MODEL SERVICE: HCPCS | Performed by: INTERNAL MEDICINE

## 2020-07-17 ENCOUNTER — TELEPHONE (OUTPATIENT)
Dept: ONCOLOGY | Facility: CLINIC | Age: 80
End: 2020-07-17

## 2020-07-17 ENCOUNTER — TELEPHONE (OUTPATIENT)
Dept: ONCOLOGY | Facility: HOSPITAL | Age: 80
End: 2020-07-17

## 2020-07-17 NOTE — TELEPHONE ENCOUNTER
"Pt called in requesting call back re: pain she is having and wondering if she needs to be seen sooner. Spoke with pt who tells me she saw her primary care provider on Monday. She does not like or trust her PCP; says she had labs done there but no one told her the results. She is wondering if she is dehydrated. PCP is not a [employer] provider, so I am not able to see any labs. Pt read some over the phone to me. BUN/Creat were WNL; however she reports to me that the Alk phos/ALT/AST were elevated. States her daughter is a nurse and she will review them as well. Pt spoke at length re: frustrations with her PCP. Suggested contacting her insurance as this was the primary care she was assigned to per them. Asked if there was anything she needed from us or Dr. Gray and reviewed her next appointment with him in December. She said \"no, you have been very helpful to listen to me today and tell me that my kidney function was normal.\" She will continue to try to stay hydrated as her daughter recommended that to her.   "

## 2020-08-07 ENCOUNTER — HOSPITAL ENCOUNTER (OUTPATIENT)
Facility: HOSPITAL | Age: 80
Setting detail: OBSERVATION
Discharge: HOME | End: 2020-08-09
Attending: EMERGENCY MEDICINE | Admitting: INTERNAL MEDICINE

## 2020-08-07 ENCOUNTER — APPOINTMENT (OUTPATIENT)
Dept: GENERAL RADIOLOGY | Facility: HOSPITAL | Age: 80
End: 2020-08-07

## 2020-08-07 DIAGNOSIS — R79.89 ELEVATED LIVER FUNCTION TESTS: ICD-10-CM

## 2020-08-07 DIAGNOSIS — N28.9 ACUTE RENAL INSUFFICIENCY: ICD-10-CM

## 2020-08-07 DIAGNOSIS — C21.0 ANAL CANCER (HCC): ICD-10-CM
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
## 2020-08-07 PROBLEM — E86.0 DEHYDRATION: Status: ACTIVE | Noted: 2020-08-07

## 2020-08-07 PROBLEM — R63.4 WEIGHT LOSS: Status: ACTIVE | Noted: 2020-08-07

## 2020-08-07 PROBLEM — D72.829 LEUKOCYTOSIS: Status: ACTIVE | Noted: 2020-08-07

## 2020-08-07 PROBLEM — N17.9 AKI (ACUTE KIDNEY INJURY): Status: ACTIVE | Noted: 2020-08-07

## 2020-08-07 PROBLEM — E78.5 HYPERLIPIDEMIA: Status: ACTIVE | Noted: 2020-08-07

## 2020-08-07 PROBLEM — I10 HYPERTENSION: Status: ACTIVE | Noted: 2020-08-07

## 2020-08-07 PROBLEM — R53.1 WEAKNESS: Status: ACTIVE | Noted: 2020-08-07

## 2020-08-07 PROBLEM — E87.20 METABOLIC ACIDOSIS: Status: ACTIVE | Noted: 2020-08-07

## 2020-08-07 LAB
ALBUMIN SERPL-MCNC: 2.8 G/DL (ref 3.5–5.2)
ALBUMIN/GLOB SERPL: 0.7 G/DL
ALP SERPL-CCNC: 171 U/L (ref 39–117)
ALT SERPL W P-5'-P-CCNC: 44 U/L (ref 1–33)
ANION GAP SERPL CALCULATED.3IONS-SCNC: 13.1 MMOL/L (ref 5–15)
AST SERPL-CCNC: 37 U/L (ref 1–32)
B PARAPERT DNA SPEC QL NAA+PROBE: NOT DETECTED
B PERT DNA SPEC QL NAA+PROBE: NOT DETECTED
BACTERIA UR QL AUTO: ABNORMAL /HPF
BASOPHILS # BLD AUTO: 0.11 10*3/MM3 (ref 0–0.2)
BASOPHILS NFR BLD AUTO: 0.6 % (ref 0–1.5)
BILIRUB SERPL-MCNC: 0.3 MG/DL (ref 0–1.2)
BILIRUB UR QL STRIP: NEGATIVE
BUN SERPL-MCNC: 38 MG/DL (ref 8–23)
BUN/CREAT SERPL: 23 (ref 7–25)
C PNEUM DNA NPH QL NAA+NON-PROBE: NOT DETECTED
CALCIUM SPEC-SCNC: 10.2 MG/DL (ref 8.6–10.5)
CHLORIDE SERPL-SCNC: 105 MMOL/L (ref 98–107)
CK SERPL-CCNC: 25 U/L (ref 20–180)
CLARITY UR: CLEAR
CO2 SERPL-SCNC: 18.9 MMOL/L (ref 22–29)
COLOR UR: YELLOW
CREAT SERPL-MCNC: 1.65 MG/DL (ref 0.57–1)
DEPRECATED RDW RBC AUTO: 42.3 FL (ref 37–54)
EOSINOPHIL # BLD AUTO: 0.1 10*3/MM3 (ref 0–0.4)
EOSINOPHIL NFR BLD AUTO: 0.6 % (ref 0.3–6.2)
ERYTHROCYTE [DISTWIDTH] IN BLOOD BY AUTOMATED COUNT: 12.7 % (ref 12.3–15.4)
FLUAV H1 2009 PAND RNA NPH QL NAA+PROBE: NOT DETECTED
FLUAV H1 HA GENE NPH QL NAA+PROBE: NOT DETECTED
FLUAV H3 RNA NPH QL NAA+PROBE: NOT DETECTED
FLUAV SUBTYP SPEC NAA+PROBE: NOT DETECTED
FLUBV RNA ISLT QL NAA+PROBE: NOT DETECTED
GFR SERPL CREATININE-BSD FRML MDRD: 30 ML/MIN/1.73
GLOBULIN UR ELPH-MCNC: 3.9 GM/DL
GLUCOSE BLDC GLUCOMTR-MCNC: 117 MG/DL (ref 70–130)
GLUCOSE SERPL-MCNC: 115 MG/DL (ref 65–99)
GLUCOSE UR STRIP-MCNC: NEGATIVE MG/DL
HADV DNA SPEC NAA+PROBE: NOT DETECTED
HCOV 229E RNA SPEC QL NAA+PROBE: NOT DETECTED
HCOV HKU1 RNA SPEC QL NAA+PROBE: NOT DETECTED
HCOV NL63 RNA SPEC QL NAA+PROBE: NOT DETECTED
HCOV OC43 RNA SPEC QL NAA+PROBE: NOT DETECTED
HCT VFR BLD AUTO: 31.3 % (ref 34–46.6)
HGB BLD-MCNC: 10.2 G/DL (ref 12–15.9)
HGB UR QL STRIP.AUTO: ABNORMAL
HMPV RNA NPH QL NAA+NON-PROBE: NOT DETECTED
HOLD SPECIMEN: NORMAL
HOLD SPECIMEN: NORMAL
HPIV1 RNA SPEC QL NAA+PROBE: NOT DETECTED
HPIV2 RNA SPEC QL NAA+PROBE: NOT DETECTED
HPIV3 RNA NPH QL NAA+PROBE: NOT DETECTED
HPIV4 P GENE NPH QL NAA+PROBE: NOT DETECTED
HYALINE CASTS UR QL AUTO: ABNORMAL /LPF
IMM GRANULOCYTES # BLD AUTO: 0.25 10*3/MM3 (ref 0–0.05)
IMM GRANULOCYTES NFR BLD AUTO: 1.5 % (ref 0–0.5)
KETONES UR QL STRIP: NEGATIVE
LEUKOCYTE ESTERASE UR QL STRIP.AUTO: ABNORMAL
LYMPHOCYTES # BLD AUTO: 1.25 10*3/MM3 (ref 0.7–3.1)
LYMPHOCYTES NFR BLD AUTO: 7.3 % (ref 19.6–45.3)
M PNEUMO IGG SER IA-ACNC: NOT DETECTED
MAGNESIUM SERPL-MCNC: 1.9 MG/DL (ref 1.6–2.4)
MCH RBC QN AUTO: 29.9 PG (ref 26.6–33)
MCHC RBC AUTO-ENTMCNC: 32.6 G/DL (ref 31.5–35.7)
MCV RBC AUTO: 91.8 FL (ref 79–97)
MONOCYTES # BLD AUTO: 0.74 10*3/MM3 (ref 0.1–0.9)
MONOCYTES NFR BLD AUTO: 4.3 % (ref 5–12)
NEUTROPHILS NFR BLD AUTO: 14.62 10*3/MM3 (ref 1.7–7)
NEUTROPHILS NFR BLD AUTO: 85.7 % (ref 42.7–76)
NITRITE UR QL STRIP: NEGATIVE
NRBC BLD AUTO-RTO: 0 /100 WBC (ref 0–0.2)
NT-PROBNP SERPL-MCNC: 1069 PG/ML (ref 0–1800)
PH UR STRIP.AUTO: <=5 [PH] (ref 5–8)
PLATELET # BLD AUTO: 622 10*3/MM3 (ref 140–450)
PMV BLD AUTO: 8.6 FL (ref 6–12)
POTASSIUM SERPL-SCNC: 3.8 MMOL/L (ref 3.5–5.2)
PROCALCITONIN SERPL-MCNC: 0.22 NG/ML (ref 0–0.25)
PROT SERPL-MCNC: 6.7 G/DL (ref 6–8.5)
PROT UR QL STRIP: ABNORMAL
RBC # BLD AUTO: 3.41 10*6/MM3 (ref 3.77–5.28)
RBC # UR: ABNORMAL /HPF
REF LAB TEST METHOD: ABNORMAL
RHINOVIRUS RNA SPEC NAA+PROBE: NOT DETECTED
RSV RNA NPH QL NAA+NON-PROBE: NOT DETECTED
SARS-COV-2 RNA PNL SPEC NAA+PROBE: NOT DETECTED
SODIUM SERPL-SCNC: 137 MMOL/L (ref 136–145)
SP GR UR STRIP: 1.01 (ref 1–1.03)
SQUAMOUS #/AREA URNS HPF: ABNORMAL /HPF
TROPONIN T SERPL-MCNC: <0.01 NG/ML (ref 0–0.03)
UROBILINOGEN UR QL STRIP: ABNORMAL
WBC # BLD AUTO: 17.07 10*3/MM3 (ref 3.4–10.8)
WBC UR QL AUTO: ABNORMAL /HPF
WHOLE BLOOD HOLD SPECIMEN: NORMAL
WHOLE BLOOD HOLD SPECIMEN: NORMAL

## 2020-08-07 PROCEDURE — 84484 ASSAY OF TROPONIN QUANT: CPT | Performed by: EMERGENCY MEDICINE

## 2020-08-07 PROCEDURE — 81001 URINALYSIS AUTO W/SCOPE: CPT | Performed by: EMERGENCY MEDICINE

## 2020-08-07 PROCEDURE — 99284 EMERGENCY DEPT VISIT MOD MDM: CPT

## 2020-08-07 PROCEDURE — G0378 HOSPITAL OBSERVATION PER HR: HCPCS

## 2020-08-07 PROCEDURE — 51798 US URINE CAPACITY MEASURE: CPT

## 2020-08-07 PROCEDURE — 82962 GLUCOSE BLOOD TEST: CPT

## 2020-08-07 PROCEDURE — 87040 BLOOD CULTURE FOR BACTERIA: CPT | Performed by: NURSE PRACTITIONER

## 2020-08-07 PROCEDURE — 85025 COMPLETE CBC W/AUTO DIFF WBC: CPT | Performed by: EMERGENCY MEDICINE

## 2020-08-07 PROCEDURE — 83880 ASSAY OF NATRIURETIC PEPTIDE: CPT | Performed by: EMERGENCY MEDICINE

## 2020-08-07 PROCEDURE — 83735 ASSAY OF MAGNESIUM: CPT | Performed by: EMERGENCY MEDICINE

## 2020-08-07 PROCEDURE — 93010 ELECTROCARDIOGRAM REPORT: CPT | Performed by: INTERNAL MEDICINE

## 2020-08-07 PROCEDURE — 93005 ELECTROCARDIOGRAM TRACING: CPT | Performed by: EMERGENCY MEDICINE

## 2020-08-07 PROCEDURE — 82607 VITAMIN B-12: CPT | Performed by: INTERNAL MEDICINE

## 2020-08-07 PROCEDURE — 82550 ASSAY OF CK (CPK): CPT | Performed by: EMERGENCY MEDICINE

## 2020-08-07 PROCEDURE — 0202U NFCT DS 22 TRGT SARS-COV-2: CPT | Performed by: EMERGENCY MEDICINE

## 2020-08-07 PROCEDURE — 84145 PROCALCITONIN (PCT): CPT | Performed by: NURSE PRACTITIONER

## 2020-08-07 PROCEDURE — P9612 CATHETERIZE FOR URINE SPEC: HCPCS

## 2020-08-07 PROCEDURE — 94799 UNLISTED PULMONARY SVC/PX: CPT

## 2020-08-07 PROCEDURE — 71045 X-RAY EXAM CHEST 1 VIEW: CPT

## 2020-08-07 PROCEDURE — 87086 URINE CULTURE/COLONY COUNT: CPT | Performed by: EMERGENCY MEDICINE

## 2020-08-07 PROCEDURE — 80053 COMPREHEN METABOLIC PANEL: CPT | Performed by: EMERGENCY MEDICINE

## 2020-08-07 RX ORDER — SODIUM CHLORIDE 0.9 % (FLUSH) 0.9 %
10 SYRINGE (ML) INJECTION AS NEEDED
Status: DISCONTINUED | OUTPATIENT
Start: 2020-08-07 | End: 2020-08-09 | Stop reason: HOSPADM

## 2020-08-07 RX ORDER — SODIUM CHLORIDE 9 MG/ML
50 INJECTION, SOLUTION INTRAVENOUS CONTINUOUS
Status: ACTIVE | OUTPATIENT
Start: 2020-08-07 | End: 2020-08-08

## 2020-08-07 RX ORDER — PRAVASTATIN SODIUM 40 MG
40 TABLET ORAL 2 TIMES DAILY
Status: DISCONTINUED | OUTPATIENT
Start: 2020-08-07 | End: 2020-08-07

## 2020-08-07 RX ORDER — ONDANSETRON 2 MG/ML
4 INJECTION INTRAMUSCULAR; INTRAVENOUS EVERY 6 HOURS PRN
Status: DISCONTINUED | OUTPATIENT
Start: 2020-08-07 | End: 2020-08-09 | Stop reason: HOSPADM

## 2020-08-07 RX ORDER — PANTOPRAZOLE SODIUM 40 MG/1
40 TABLET, DELAYED RELEASE ORAL
Status: DISCONTINUED | OUTPATIENT
Start: 2020-08-08 | End: 2020-08-09 | Stop reason: HOSPADM

## 2020-08-07 RX ORDER — CEFTRIAXONE SODIUM 1 G/50ML
1 INJECTION, SOLUTION INTRAVENOUS EVERY 24 HOURS
Status: DISCONTINUED | OUTPATIENT
Start: 2020-08-07 | End: 2020-08-07

## 2020-08-07 RX ORDER — PRAVASTATIN SODIUM 40 MG
40 TABLET ORAL NIGHTLY
Status: DISCONTINUED | OUTPATIENT
Start: 2020-08-07 | End: 2020-08-09 | Stop reason: HOSPADM

## 2020-08-07 RX ORDER — ACETAMINOPHEN 325 MG/1
650 TABLET ORAL EVERY 4 HOURS PRN
Status: DISCONTINUED | OUTPATIENT
Start: 2020-08-07 | End: 2020-08-09 | Stop reason: HOSPADM

## 2020-08-07 RX ORDER — SODIUM CHLORIDE 0.9 % (FLUSH) 0.9 %
10 SYRINGE (ML) INJECTION EVERY 12 HOURS SCHEDULED
Status: DISCONTINUED | OUTPATIENT
Start: 2020-08-07 | End: 2020-08-09 | Stop reason: HOSPADM

## 2020-08-07 RX ORDER — ASPIRIN 81 MG/1
81 TABLET ORAL DAILY
Status: DISCONTINUED | OUTPATIENT
Start: 2020-08-07 | End: 2020-08-09 | Stop reason: HOSPADM

## 2020-08-07 RX ORDER — ONDANSETRON 4 MG/1
4 TABLET, FILM COATED ORAL EVERY 6 HOURS PRN
Status: DISCONTINUED | OUTPATIENT
Start: 2020-08-07 | End: 2020-08-09 | Stop reason: HOSPADM

## 2020-08-07 RX ADMIN — SODIUM CHLORIDE 250 ML: 9 INJECTION, SOLUTION INTRAVENOUS at 11:42

## 2020-08-07 RX ADMIN — PRAVASTATIN SODIUM 40 MG: 40 TABLET ORAL at 20:52

## 2020-08-07 RX ADMIN — SODIUM CHLORIDE 50 ML/HR: 9 INJECTION, SOLUTION INTRAVENOUS at 18:21

## 2020-08-07 RX ADMIN — ASPIRIN 81 MG: 81 TABLET, COATED ORAL at 18:20

## 2020-08-07 RX ADMIN — SODIUM CHLORIDE, PRESERVATIVE FREE 10 ML: 5 INJECTION INTRAVENOUS at 20:52

## 2020-08-07 NOTE — ED NOTES
Dr. Black at bedside.  Spoke with family member regarding patient admission.  Informed patient and family of room assignment and phone numbers to reach patient and nursing station and of the enhanced visitor policy.  Patient/family verbalized understanding.       Leeanna Amato RN  08/07/20 [id number]

## 2020-08-07 NOTE — ED TRIAGE NOTES
"Pt comes to ER for generalized weakness \"since I had chemo back in April for rectal cancer.\" Pt asked what is different today to make her come to the ER and she states that she fell this morning in the bathroom due to weakness. Denies any new injuries from the fall or head injury but is complaining 10/10 leg pain that \"is always there\". Pt and RN wearing mask upon triage.     "

## 2020-08-07 NOTE — H&P
History and Physical    Patient Name: Benita Garcia  Age/Sex: 80 y.o. female  : 1940  MRN: [id number]    Date of Admission: 2020  Date of Encounter Visit: 20  Encounter Provider: JUNIOR Hardin  Place of Service: [facility]  Patient Care Team:  Rosa Christianson MD as PCP - General (Internal Medicine)  Zen Franco MD as Consulting Physician (Gastroenterology)  Ezequiel Gray MD as Consulting Physician (Hematology and Oncology)  Rosa Christianson MD as Referring Physician (Internal Medicine)  Mariana Beyer MD as Consulting Physician (Radiation Oncology)    Subjective:     Chief Complaint:   Chief Complaint   Patient presents with   • Weakness - Generalized     Patient reports she was walking into the bathroom this morning, felt generalized weakness without lightheadedness palpitation or syncope, lost her balance and fell.  Patient denies injury from her fall, hitting her head or loss of consciousness.       History of Present Illness  Benita Garcia is a 80 y.o. female with a history of anal cancer, colon polyps, HTN, GERD and depression. Patient presented with complaints of a fall this morning. She reports she got up to go to the bathroom and after walking into the bathroom she turned around and lost her balance and fell, but did not hit her head.  She denies any chest pain, palpitations, loss of consciousness, vomiting or diarrhea.  She reports she did notice that the floor was soaked and thought she may have peed herself.  Recently she has been having episodes of lightheadedness and dizziness.     Follows with Dr. Albright and Dr. Stack for anal cancer and completed chemoradiation in late 3/2020. PET 2020 showed no new metastatic disease and improved uptake within the rectum.  She states that since completing radiation she is still had persistent issues with decreased appetite and overall poor intake with a 20 to 25 pound weight loss.   Yesterday she only ate a peanut butter sandwich and a protein shake.  She states she is also felt weaker than normal and gets fatigued easily.  She denies any fever, chills, night sweats or recent sick exposure or dysuria.  She did admit to taking ibuprofen twice a day due to arthritis pain and abdominal discomfort.  Her medical history stated that she had had pulmonary hypertension, the patient denies this or any other cardiac history.    Labs in the ER showed BNP 1069, creatinine 1.65, BUN 38, alkaline phos 171, AST 37, ALT 44, albumin 2.8, WBC 17.07 with left shift.  UA showed RBC, WBC and protein and was sent for culture.  Chest x-ray showed some interstitial edema, but no obvious infiltrate.  RVP with COVID-19 was negative.  EKG shows sinus rhythm.      Review of Systems   Constitutional: Positive for appetite change (decreased with poor appetite) and unexpected weight change. Negative for chills, fatigue and fever.   HENT: Negative for congestion and trouble swallowing.    Eyes: Negative for visual disturbance.   Respiratory: Negative for cough, shortness of breath and wheezing.    Cardiovascular: Negative for chest pain, palpitations and leg swelling.   Gastrointestinal: Positive for constipation and nausea. Negative for abdominal pain, blood in stool, diarrhea and vomiting.   Genitourinary: Negative for difficulty urinating and dysuria.   Musculoskeletal: Negative for arthralgias and back pain.   Neurological: Positive for weakness and light-headedness. Negative for syncope and headaches.   Psychiatric/Behavioral: Negative for confusion. The patient is not nervous/anxious.    All other systems reviewed and are negative.      Past Medical and Surgical History:  Past Medical History:   Diagnosis Date   • Anal cancer (CMS/HCC) 01/2020    SQUAMOUS CELL CARCINOMA, FOLLOWED BY DR. JOVITA BAUTISTA   • Aortic atherosclerosis (CMS/HCC)    • Arthritis    • Boil    • Chronic constipation    • Colon polyps     FOLLOWED BY  DR. JOVITA BAUTISTA   • Depression    • Encounter for screening mammogram for breast cancer     09/2014, 10/2014, 11/2015, 11/2016, 11/2017, 11/2018, 11/2019.   • Exudative senile macular retinal degeneration (CMS/HCC)    • GERD (gastroesophageal reflux disease)     FOLLOWED BY DR. IMTIAZ SELLERS   • Hemorrhoids    • History of chemotherapy 2020    FOR ANAL CANCER, FOLLOWED BY DR. KASHMIR ARITA   • History of radiation therapy 2020    FOLLOWED BY DR. 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       Past Surgical History:   Procedure Laterality Date   • COLONOSCOPY W/ POLYPECTOMY N/A 01/03/2020    LARGE MASS IN ANORECTAL AREA, BX: SQUAMOUS CELL CARCINOMA, 3 TUBULAR ADENOMA POLYPS IN ASCENDING, 3 TUBULAR ADENOMA POLYPS IN TRANSVERSE, MELANOSIS COLI, DR. IMTIAZ SELLERS AT [facility] [place]    • ENDOSCOPY N/A 01/03/2020    DR. IMTIAZ SELLERS AT [facility] [place]   • HYSTERECTOMY N/A 1993       Home Medications:   No current facility-administered medications on file prior to encounter.      Current Outpatient Medications on File Prior to Encounter   Medication Sig Dispense Refill   • aspirin 81 MG EC tablet Take 81 mg by mouth Daily.     • Cranberry 250 MG tablet Take  by mouth.     • lisinopril (PRINIVIL,ZESTRIL) 10 MG tablet      • Multiple Vitamins-Minerals (MULTIVITAMIN ADULT) tablet Take  by mouth.     • Multiple Vitamins-Minerals (PRESERVISION AREDS 2 PO) Take  by mouth 2 (Two) Times a Day.     • ofloxacin (OCUFLOX) 0.3 % ophthalmic solution      • pantoprazole (PROTONIX) 40 MG EC tablet      • pravastatin (PRAVACHOL) 40 MG tablet 40 mg 2 (Two) Times a Day.     • vitamin C (ASCORBIC ACID) 500 MG tablet Take 500 mg by mouth Daily.     • vitamin E 400 UNIT capsule  Take 400 Units by mouth Daily.     • capecitabine (XELODA) 500 MG chemo tablet Take 3 tablets by mouth in the morning and 3 tablets by mouth in the evening on Monday-Friday with radiation. 60 tablet 0   • HYDROcodone-acetaminophen (NORCO) 5-325 MG per tablet Take 1-2 tablets by mouth Every 6 (Six) Hours As Needed for Moderate Pain . 120 tablet 0   • ondansetron (ZOFRAN) 8 MG tablet Take 1 tablet by mouth 3 (Three) Times a Day As Needed for Nausea or Vomiting. 30 tablet 5   • phenazopyridine (Pyridium) 200 MG tablet 1 tab daily or BID after meals 30 tablet 1   • predniSONE (DELTASONE) 10 MG tablet Take 1 tablet by mouth Daily. TAKE WITH FOOD 30 tablet 1   • prochlorperazine (COMPAZINE) 10 MG tablet Take 1 tablet by mouth Every 6 (Six) Hours As Needed for Nausea or Vomiting. 40 tablet 2   • traMADol (ULTRAM) 50 MG tablet Take 1 tablet by mouth Every 6 (Six) Hours As Needed for Moderate Pain . 40 tablet 0       Inpatient Medications:  Scheduled Meds:   Continuous Infusions:   PRN Meds:.•  [COMPLETED] Insert peripheral IV **AND** sodium chloride    Allergies:  Allergies   Allergen Reactions   • Codeine Rash   • Lipitor [Atorvastatin] Rash       Past Social History:  Social History     Socioeconomic History   • Marital status:      Spouse name: Dimitri   • Number of children: 2   • Years of education: High school   • Highest education level: Not on file   Occupational History     Employer: RETIRED   Tobacco Use   • Smoking status: Former Smoker     Packs/day: 0.50     Years: 40.00     Pack years: 20.00     Types: Cigarettes     Start date: 10/31/2004   • Smokeless tobacco: Never Used   Substance and Sexual Activity   • Alcohol use: Not Currently     Frequency: Never   • Drug use: Never   • Sexual activity: Yes     Partners: Male     Birth control/protection: Post-menopausal, Surgical     Comment: .       Past Family History:  Family History   Problem Relation Age of Onset   • Cancer Sister         Lung Cancer    • Lung cancer Sister 61   • Brain cancer Sister    • Hypertension Brother    • Heart disease Brother    • Cancer Sister         Brain Cancer   • Lung cancer Sister 72   • Heart disease Father    • Cancer Sister    • Lung cancer Sister    • Cancer Sister    • Lung cancer Sister        Objective:   Temp:  [97.8 °F (36.6 °C)] 97.8 °F (36.6 °C)  Heart Rate:  [74-98] 93  Resp:  [16-18] 16  BP: (126-161)/(71-83) 161/83   SpO2:  [92 %-95 %] 94 %  on    Device (Oxygen Therapy): room air    Intake/Output Summary (Last 24 hours) at 8/7/2020 1624  Last data filed at 8/7/2020 1305  Gross per 24 hour   Intake 250 ml   Output --   Net 250 ml     Body mass index is 25.07 kg/m².      08/07/20  1028 08/07/20  1544   Weight: 57.2 kg (126 lb) 60.2 kg (132 lb 11.2 oz)     Weight change:     Physical Exam   Constitutional: She is oriented to person, place, and time. No distress.   Thin and frail   HENT:   Head: Normocephalic and atraumatic.   Dry oral mucosa   Eyes: Pupils are equal, round, and reactive to light. No scleral icterus.   Neck: Neck supple. No tracheal deviation present.   Cardiovascular: Normal rate, regular rhythm and normal heart sounds.   No murmur heard.  Pulmonary/Chest: Effort normal. She has no wheezes.   Diminished bases with faint rhonchi   Abdominal: Soft. Bowel sounds are normal. She exhibits no distension. There is no tenderness.   Hardened pelvic palpable nodular mass without tenderness.   Musculoskeletal: She exhibits no edema.   Neurological: She is alert and oriented to person, place, and time.   Skin: Skin is warm and dry. She is not diaphoretic.   Psychiatric: Her behavior is normal.   Flat affect   Nursing note and vitals reviewed.       Lab Review:     Results from last 7 days   Lab Units 08/07/20  1039   SODIUM mmol/L 137   POTASSIUM mmol/L 3.8   CHLORIDE mmol/L 105   CO2 mmol/L 18.9*   BUN mg/dL 38*   CREATININE mg/dL 1.65*   GLUCOSE mg/dL 115*   CALCIUM mg/dL 10.2   AST (SGOT) U/L 37*   ALT (SGPT)  U/L 44*     Estimated Creatinine Clearance: 23.4 mL/min (A) (by C-G formula based on SCr of 1.65 mg/dL (H)).          Results from last 7 days   Lab Units 08/07/20  1039   CK TOTAL U/L 25   TROPONIN T ng/mL <0.010     Results from last 7 days   Lab Units 08/07/20  1039   PROBNP pg/mL 1,069.0         Results from last 7 days   Lab Units 08/07/20  1039   MAGNESIUM mg/dL 1.9         Results from last 7 days   Lab Units 08/07/20  1039   WBC 10*3/mm3 17.07*   HEMOGLOBIN g/dL 10.2*   HEMATOCRIT % 31.3*   PLATELETS 10*3/mm3 622*   MCV fL 91.8   MCH pg 29.9   MCHC g/dL 32.6   RDW % 12.7   RDW-SD fl 42.3   MPV fL 8.6   NEUTROPHIL % % 85.7*   LYMPHOCYTE % % 7.3*   MONOCYTES % % 4.3*   EOSINOPHIL % % 0.6   BASOPHIL % % 0.6   IMM GRAN % % 1.5*   NEUTROS ABS 10*3/mm3 14.62*   LYMPHS ABS 10*3/mm3 1.25   MONOS ABS 10*3/mm3 0.74   EOS ABS 10*3/mm3 0.10   BASOS ABS 10*3/mm3 0.11   IMMATURE GRANS (ABS) 10*3/mm3 0.25*   NRBC /100 WBC 0.0                           Results from last 7 days   Lab Units 08/07/20  1141   NITRITE UA  Negative   WBC UA /HPF 13-20*   BACTERIA UA /HPF None Seen   SQUAM EPITHEL UA /HPF 0-2     Results from last 7 days   Lab Units 08/07/20  1402   ADENOVIRUS DETECTION BY PCR  Not Detected   CORONAVIRUS 229E  Not Detected   CORONAVIRUS HKU1  Not Detected   CORONAVIRUS NL63  Not Detected   CORONAVIRUS OC43  Not Detected   HUMAN METAPNEUMOVIRUS  Not Detected   HUMAN RHINOVIRUS/ENTEROVIRUS  Not Detected   INFLUENZA B PCR  Not Detected   PARAINFLUENZA 1  Not Detected   PARAINFLUENZA VIRUS 2  Not Detected   PARAINFLUENZA VIRUS 3  Not Detected   PARAINFLUENZA VIRUS 4  Not Detected   BORDETELLA PERTUSSIS PCR  Not Detected   BORDETELLA PARAPERTUSSIS PCR  Not Detected   [id number]  Not Detected   CHLAMYDOPHILA PNEUMONIAE PCR  Not Detected   MYCOPLAMA PNEUMO PCR  Not Detected   INFLUENZA A H3  Not Detected   INFLUENZA A H1  Not Detected   RSV, PCR  Not Detected           Imaging:  Imaging Results (Last 24 Hours)      Procedure Component Value Units Date/Time    XR Chest 1 View [[id number]] Collected:  08/07/20 1102     Updated:  08/07/20 1106    Narrative:       XR CHEST 1 VW-  08/07/2020     HISTORY: Possible pneumonia.     Heart size is within normal limits. Lungs are slightly underinflated  with some vascular crowding. There is mild interstitial prominence of  the lungs which could be chronic in nature versus some mild interstitial  edema.     No focal infiltrates are seen.     There are no previous studies available for comparison.       Impression:       Mild interstitial prominence could be chronic in nature  versus some mild interstitial edema. This is somewhat exaggerated by  suboptimal inspiration.  2. No focal infiltrates are seen.     This report was finalized on 8/7/2020 11:03 AM by Dr. Matthias Landeros M.D.             EKG:  ECG 12 Lead   Preliminary Result   HEART RATE= 90  bpm   RR Interval= 672  ms   WV Interval= 153  ms   P Horizontal Axis= 7  deg   P Front Axis= 47  deg   QRSD Interval= 78  ms   QT Interval= 361  ms   QRS Axis= 16  deg   T Wave Axis= 0  deg   - NORMAL ECG -   Sinus rhythm   Electronically Signed By:    Date and Time of Study: 2020-08-07 10:49:03          I reviewed the patient's new clinical results and medications.  I reviewed the patient's new imaging results and agree with the interpretation.  I personally viewed and interpreted the patient's EKG/Telemetry data.    Problem List:     Active Hospital Problems    Diagnosis  POA   • Fall [W19.XXXA]  Yes   • Weakness [R53.1]  Unknown   • LIAM (acute kidney injury) (CMS/HCC) [N17.9]  Unknown   • Dehydration [E86.0]  Unknown   • Metabolic acidosis [E87.2]  Unknown   • Weight loss [R63.4]  Unknown   • Hyperlipidemia [E78.5]  Unknown   • Hypertension [I10]  Unknown   • Anal cancer (CMS/HCC) [C21.0]  Yes      Resolved Hospital Problems   No resolved problems to display.       Assessment and Plan:     80-year-old female with history of rectal cancer  who presented with fall, weakness and decreased appetite.      Fall/  Weakness  -Patient denies any pain or injury from the fall.  She states that she did not lose consciousness or hit her head, but also seemed vague about whether she had an incontinent episode.   -Orthostatics  -PT to eval      LIAM/  Dehydration/  Metabolic acidosis  -Creatinines less than 1 and currently up to 1.65.  Likely due to chronic poor oral intake, dehydration and NSAID use.  -Avoid nephrotoxic medication educated patient about avoiding NSAIDs in the future.  -Was given a bolus down the ED and will continue with slow IV fluids.  -Repeat labs in a.m.    Leukocytosis   -Denies any recent fever.  UA did show some WBCs, but denies any dysuria or frequency.  Chest x-ray showed some vascular interstitial congestion, but no obvious opacity and patient denies any recent cough or shortness of breath and is on room air with sats greater than 93%.  -Check pro calcitonin and blood cultures  -monitor urine culture  -check CT abdomen given decreased appetite.   - no clear source for infection at this time. If procal if elevated will considers starting some antibiotics, but will monitor for now.   -COVID was negative with no known exposure or high risk so will discontinue isolation.    anal cancer (CMS/HCC)/  Weight loss  -Completed chemo and radiation.  -Recent PET scan in June 2020 had also noted some thyroid nodules, mediastinal adenopathy and pulmonary nodule as well as a pelvic cyst.   -CT abdomen as per above  -We will ask oncology to reevaluate given overall decline  -Nutrition to eval and will add boost with meals      Hyperlipidemia  -Currently on pravastatin 40 twice a day, which seems to be a fairly high dose especially given no prior history of CAD or stroke.  -Reduce pravastatin to 40 daily and check lipid panel in a.m.      Hypertension  -BP currently stable.  Due to LIAM will hold lisinopril.    observation  DVT prophylaxis: SCD's  Code  status addressed: full code  Diet: regular  Consult CCP to help with DC planning    I discussed the patients findings and my recommendations with patient, family and nursing staff.      JUNIOR Hardin  [facility]  08/07/20  4:24 PM    Dictated utilizing Dragon dictation       I wore full protective equipment throughout this patient encounter including a face mask, eye shield and gloves. Hand hygiene was performed before donning protective equipment and after removal when leaving the room.

## 2020-08-07 NOTE — ED PROVIDER NOTES
EMERGENCY DEPARTMENT ENCOUNTER    CHIEF COMPLAINT  Chief Complaint: Generalized weakness  History given by: Patient and   History limited by: Nothing  Room Number: 14/14  PMD: Rosa Christianson MD      HPI:  Pt is a 80 y.o. female who presents with generalized weakness for several months, poor appetite for 3 to 4 months after completing chemotherapy and radiation 6 weeks course for rectal cancer.  Patient and family report that she is cancer free per her follow-up with Dr. Dara Stack and Dr. Gray with [facility].  Patient denies cough, chest pain, shortness of air, fever, abdominal pain, nausea or vomiting, wounds or rash, changes to urinary habits, blood or black color to her stool.  Patient reports she has 3 small bowel movements daily, no changes in her urinary habits.  Patient reports that she has been having ongoing bilateral arm and leg aches without swelling, focal weakness or numbness for several months that is unchanged today.  Patient reports she was walking into the bathroom this morning, felt generalized weakness without lightheadedness palpitation or syncope, lost her balance and fell.  Patient denies injury from her fall, hitting her head or loss of consciousness.  Patient has been reports she is had poor appetite, decreased p.o. intake with a 25 pound weight loss over the past 3 to 4 months.    Duration: 3 to 4 months  Intensity/Severity: Moderate  Associated Symptoms: Poor appetite  Aggravating Factors: Nothing  Alleviating Factors: Nothing  Previous Episodes: No previous falls  Treatment before arrival: No    PAST MEDICAL HISTORY  Active Ambulatory Problems     Diagnosis Date Noted   • Rectal mass 01/27/2020   • Anal cancer (CMS/HCC) 01/01/2020     Resolved Ambulatory Problems     Diagnosis Date Noted   • No Resolved Ambulatory Problems     Past Medical History:   Diagnosis Date   • Aortic atherosclerosis (CMS/HCC)    • Arthritis    • Boil    • Chronic constipation    • Colon polyps     • Depression    • Encounter for screening mammogram for breast cancer    • Exudative senile macular retinal degeneration (CMS/HCC)    • GERD (gastroesophageal reflux disease)    • Hemorrhoids    • History of chemotherapy 2020   • History of radiation therapy 2020   • Hyperlipidemia    • Hypertension    • 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 IMTIAZ SELLERS AT [facility]    • ENDOSCOPY N/A 01/03/2020    DR. IMTIAZ SELLERS AT [facility]   • HYSTERECTOMY N/A 1993       FAMILY HISTORY  Family History   Problem Relation Age of Onset   • Cancer Sister         Lung Cancer   • Lung cancer Sister 61   • Brain cancer Sister    • Hypertension Brother    • Heart disease Brother    • Cancer Sister         Brain Cancer   • Lung cancer Sister 72   • Heart disease Father    • Cancer Sister    • Lung cancer Sister    • Cancer Sister    • Lung cancer Sister        SOCIAL HISTORY  Social History     Socioeconomic History   • Marital status:      Spouse name: Dimitri   • Number of children: 2   • Years of education: High school   • Highest education level: Not on file   Occupational History     Employer: RETIRED   Tobacco Use   • Smoking status: Former Smoker     Packs/day: 0.50     Years: 40.00     Pack years: 20.00     Types: Cigarettes     Start date: 10/31/2004   • Smokeless tobacco: Never Used   Substance and Sexual Activity   • Alcohol use: Not Currently     Frequency: Never   • Drug use: Never   • Sexual activity: Yes     Partners: Male     Birth  control/protection: Post-menopausal, Surgical     Comment: .       ALLERGIES  Codeine and Lipitor [atorvastatin]    REVIEW OF SYSTEMS  Review of Systems   Constitutional: Positive for appetite change and unexpected weight change. Negative for chills and fever.   HENT: Negative for sore throat and trouble swallowing.    Eyes: Negative for visual disturbance.   Respiratory: Negative for cough and shortness of breath.    Cardiovascular: Negative for chest pain, palpitations and leg swelling.   Gastrointestinal: Negative for abdominal pain, diarrhea and vomiting.   Endocrine: Negative.    Genitourinary: Negative for decreased urine volume, dysuria and frequency.   Musculoskeletal: Negative for neck pain.   Skin: Negative for rash.   Allergic/Immunologic: Negative.    Neurological: Positive for weakness ( Generalized). Negative for dizziness, syncope, speech difficulty, light-headedness, numbness and headaches.   Hematological: Negative.    Psychiatric/Behavioral: Negative.    All other systems reviewed and are negative.      PHYSICAL EXAM  ED Triage Vitals   Temp Heart Rate Resp BP SpO2   08/07/20 1010 08/07/20 1010 08/07/20 1010 08/07/20 1029 08/07/20 1010   97.8 °F (36.6 °C) 98 18 126/71 92 %      Temp src Heart Rate Source Patient Position BP Location FiO2 (%)   -- -- 08/07/20 1029 -- --     Lying         Physical Exam   Constitutional: She is oriented to person, place, and time.  Non-toxic appearance. She appears distressed (mild).   HENT:   Head: Normocephalic and atraumatic.   Eyes: EOM are normal.   Neck: Normal range of motion. Neck supple.   Cardiovascular: Normal rate, regular rhythm, normal heart sounds and intact distal pulses.   No murmur heard.  Pulses:       Posterior tibial pulses are 2+ on the right side, and 2+ on the left side.   Pulmonary/Chest: Effort normal and breath sounds normal. No respiratory distress.   Abdominal: Soft. Bowel sounds are normal. There is no tenderness. There is no  rebound and no guarding.   Musculoskeletal: Normal range of motion. She exhibits no edema.   Neurological: She is alert and oriented to person, place, and time.   Skin: Skin is warm and dry.   Psychiatric: Affect normal.   Nursing note and vitals reviewed.      LAB RESULTS  Lab Results (last 24 hours)     Procedure Component Value Units Date/Time    CBC & Differential [[id number]] Collected:  08/07/20 1039    Specimen:  Blood Updated:  08/07/20 1049    Narrative:       The following orders were created for panel order CBC & Differential.  Procedure                               Abnormality         Status                     ---------                               -----------         ------                     CBC Auto Differential[[id number]]        Abnormal            Final result                 Please view results for these tests on the individual orders.    Comprehensive Metabolic Panel [[id number]]  (Abnormal) Collected:  08/07/20 1039    Specimen:  Blood Updated:  08/07/20 1113     Glucose 115 mg/dL      BUN 38 mg/dL      Creatinine 1.65 mg/dL      Sodium 137 mmol/L      Potassium 3.8 mmol/L      Chloride 105 mmol/L      CO2 18.9 mmol/L      Calcium 10.2 mg/dL      Total Protein 6.7 g/dL      Albumin 2.80 g/dL      ALT (SGPT) 44 U/L      AST (SGOT) 37 U/L      Alkaline Phosphatase 171 U/L      Total Bilirubin 0.3 mg/dL      eGFR Non African Amer 30 mL/min/1.73      Globulin 3.9 gm/dL      A/G Ratio 0.7 g/dL      BUN/Creatinine Ratio 23.0     Anion Gap 13.1 mmol/L     Narrative:       GFR Normal >60  Chronic Kidney Disease <60  Kidney Failure <15      Troponin [[id number]]  (Normal) Collected:  08/07/20 1039    Specimen:  Blood Updated:  08/07/20 1113     Troponin T <0.010 ng/mL     Narrative:       Troponin T Reference Range:  <= 0.03 ng/mL-   Negative for AMI  >0.03 ng/mL-     Abnormal for myocardial necrosis.  Clinicians would have to utilize clinical acumen, EKG, Troponin and serial changes to determine if it  is an Acute Myocardial Infarction or myocardial injury due to an underlying chronic condition.       Results may be falsely decreased if patient taking Biotin.      CBC Auto Differential [[id number]]  (Abnormal) Collected:  08/07/20 1039    Specimen:  Blood Updated:  08/07/20 1049     WBC 17.07 10*3/mm3      RBC 3.41 10*6/mm3      Hemoglobin 10.2 g/dL      Hematocrit 31.3 %      MCV 91.8 fL      MCH 29.9 pg      MCHC 32.6 g/dL      RDW 12.7 %      RDW-SD 42.3 fl      MPV 8.6 fL      Platelets 622 10*3/mm3      Neutrophil % 85.7 %      Lymphocyte % 7.3 %      Monocyte % 4.3 %      Eosinophil % 0.6 %      Basophil % 0.6 %      Immature Grans % 1.5 %      Neutrophils, Absolute 14.62 10*3/mm3      Lymphocytes, Absolute 1.25 10*3/mm3      Monocytes, Absolute 0.74 10*3/mm3      Eosinophils, Absolute 0.10 10*3/mm3      Basophils, Absolute 0.11 10*3/mm3      Immature Grans, Absolute 0.25 10*3/mm3      nRBC 0.0 /100 WBC     Magnesium [[id number]] Collected:  08/07/20 1039    Specimen:  Blood Updated:  08/07/20 1122    BNP [[id number]] Collected:  08/07/20 1039    Specimen:  Blood Updated:  08/07/20 1122    CK [[id number]] Collected:  08/07/20 1039    Specimen:  Blood Updated:  08/07/20 1122          I ordered the above labs and reviewed the results    RADIOLOGY  XR Chest 1 View   Final Result   Mild interstitial prominence could be chronic in nature   versus some mild interstitial edema. This is somewhat exaggerated by   suboptimal inspiration.   2. No focal infiltrates are seen.       This report was finalized on 8/7/2020 11:03 AM by Dr. Matthias Landeros M.D.               I ordered the above noted radiological studies. Interpreted by radiologist.. Viewed by me in PACS.       PROCEDURES  Procedures      PROGRESS AND CONSULTS  ED Course as of Aug 07 1516   Fri Aug 07, 2020   1153 Blanka GAINES reports patient post void bladder scan is no urine in the bladder    [TO]   1329 Discussed with Dr. Lara patient's presentation,  exam, labs and he agrees to admit for further testing, treatment as needed    [TO]      ED Course User Index  [TO] Madeleine Black MD     EKG          EKG time: 1049  Rhythm/Rate: Sinus rhythm, rate in the 90s  P waves and FL: Normal P waves, normal SURESH's  QRS, axis: Unremarkable QRS  ST and T waves: Nonspecific ST/T wave findings inferior leads    Interpreted Contemporaneously by me, independently viewed  No previous EKGs        MEDICAL DECISION MAKING  Results were reviewed/discussed with the patient and they were also made aware of online access. Pt also made aware that some labs, such as cultures, will not be resulted during ER visit and follow up with PMD is necessary.       MDM       DIAGNOSIS  Final diagnoses:   Fall, initial encounter   Acute renal insufficiency   Elevated liver function tests       DISPOSITION  ADMISSION    Discussed treatment plan and reason for admission with pt/family and admitting physician.  Pt/family voiced understanding of the plan for admission for further testing/treatment as needed.           Latest Documented Vital Signs:  As of 11:33  BP- 126/71 HR- 98 Temp- 97.8 °F (36.6 °C) O2 sat- 92%    --  Patient was wearing facemask when I entered the room and throughout our encounter. Full protective equipment was worn throughout this patient encounter including a face mask, eye protection and gloves. Hand hygiene was performed before donning protective equipment and after removal when leaving the room.      Madeleine Black MD  08/07/20 2638

## 2020-08-07 NOTE — ED NOTES
Nursing report ED to floor  Benita Garcia  80 y.o.  female    HPI (triage note):   Chief Complaint   Patient presents with   • Weakness - Generalized     Patient reports she was walking into the bathroom this morning, felt generalized weakness without lightheadedness palpitation or syncope, lost her balance and fell.  Patient denies injury from her fall, hitting her head or loss of consciousness.       Admitting doctor:   Adair Lara DO    Admitting diagnosis:   The primary encounter diagnosis was Fall, initial encounter. Diagnoses of Acute renal insufficiency and Elevated liver function tests were also pertinent to this visit.    Code status:   Current Code Status     Date Active Code Status Order ID Comments User Context       Not on file          Allergies:   Codeine and Lipitor [atorvastatin]    Weight:       08/07/20  1028   Weight: 57.2 kg (126 lb)       Most recent vitals:   Vitals:    08/07/20 1028 08/07/20 1029 08/07/20 1149 08/07/20 1403   BP:  126/71 128/74 151/78   BP Location:   Right arm    Patient Position:  Lying Lying    Pulse:   74 85   Resp:   18 16   Temp:       SpO2:   95% 95%   Weight: 57.2 kg (126 lb)      Height:           Active LDAs/IV Access:   Lines, Drains & Airways    Active LDAs     Name:   Placement date:   Placement time:   Site:   Days:    Peripheral IV 08/07/20 1038 Left Antecubital   08/07/20    1038    Antecubital   less than 1                Labs (abnormal labs have a star):   Labs Reviewed   COMPREHENSIVE METABOLIC PANEL - Abnormal; Notable for the following components:       Result Value    Glucose 115 (*)     BUN 38 (*)     Creatinine 1.65 (*)     CO2 18.9 (*)     Albumin 2.80 (*)     ALT (SGPT) 44 (*)     AST (SGOT) 37 (*)     Alkaline Phosphatase 171 (*)     eGFR Non  Amer 30 (*)     All other components within normal limits    Narrative:     GFR Normal >60  Chronic Kidney Disease <60  Kidney Failure <15     URINALYSIS W/ CULTURE IF INDICATED - Abnormal;  Notable for the following components:    Blood, UA Moderate (2+) (*)     Protein,  mg/dL (2+) (*)     Leuk Esterase, UA Small (1+) (*)     All other components within normal limits   CBC WITH AUTO DIFFERENTIAL - Abnormal; Notable for the following components:    WBC 17.07 (*)     RBC 3.41 (*)     Hemoglobin 10.2 (*)     Hematocrit 31.3 (*)     Platelets 622 (*)     Neutrophil % 85.7 (*)     Lymphocyte % 7.3 (*)     Monocyte % 4.3 (*)     Immature Grans % 1.5 (*)     Neutrophils, Absolute 14.62 (*)     Immature Grans, Absolute 0.25 (*)     All other components within normal limits   URINALYSIS, MICROSCOPIC ONLY - Abnormal; Notable for the following components:    RBC, UA 6-12 (*)     WBC, UA 13-20 (*)     All other components within normal limits   TROPONIN (IN-HOUSE) - Normal    Narrative:     Troponin T Reference Range:  <= 0.03 ng/mL-   Negative for AMI  >0.03 ng/mL-     Abnormal for myocardial necrosis.  Clinicians would have to utilize clinical acumen, EKG, Troponin and serial changes to determine if it is an Acute Myocardial Infarction or myocardial injury due to an underlying chronic condition.       Results may be falsely decreased if patient taking Biotin.     MAGNESIUM - Normal   BNP (IN-HOUSE) - Normal    Narrative:     Among patients with dyspnea, NT-proBNP is highly sensitive for the detection of acute congestive heart failure. In addition NT-proBNP of <300 pg/ml effectively rules out acute congestive heart failure with 99% negative predictive value.    Results may be falsely decreased if patient taking Biotin.     CK - Normal   URINE CULTURE   COVID PRE-OP / PRE-PROCEDURE SCREENING ORDER (NO ISOLATION)    Narrative:     The following orders were created for panel order COVID PRE-OP / PRE-PROCEDURE SCREENING ORDER (NO ISOLATION) - Swab, Nasopharynx.  Procedure                               Abnormality         Status                     ---------                               -----------          ------                     Respiratory Panel PCR w/...[[id number]]                      In process                   Please view results for these tests on the individual orders.   RESPIRATORY PANEL PCR W/ COVID-19 (SARS-COV-2) SHAHRZAD/ALEX/PATRICIA IN-HOUSE, NP SWAB IN [facility], 3-4 HR TAT   RAINBOW DRAW    Narrative:     The following orders were created for panel order [facility] Draw.  Procedure                               Abnormality         Status                     ---------                               -----------         ------                     Light Blue Top[[id number]]                                   Final result               Green Top (Gel)[[id number]]                                  Final result               Lavender Top[[id number]]                                     Final result               Gold Top - SST[[id number]]                                   Final result                 Please view results for these tests on the individual orders.   CBC AND DIFFERENTIAL    Narrative:     The following orders were created for panel order CBC & Differential.  Procedure                               Abnormality         Status                     ---------                               -----------         ------                     CBC Auto Differential[[id number]]        Abnormal            Final result                 Please view results for these tests on the individual orders.   LIGHT BLUE TOP   GREEN TOP   LAVENDER TOP   GOLD TOP - SST       EKG:   ECG 12 Lead   Preliminary Result   HEART RATE= 90  bpm   RR Interval= 672  ms   AR Interval= 153  ms   P Horizontal Axis= 7  deg   P Front Axis= 47  deg   QRSD Interval= 78  ms   QT Interval= 361  ms   QRS Axis= 16  deg   T Wave Axis= 0  deg   - NORMAL ECG -   Sinus rhythm   Electronically Signed By:    Date and Time of Study: 2020-08-07 10:49:03          Meds given in ED:   Medications   sodium chloride 0.9 % flush 10 mL (has no administration in time range)   sodium  chloride 0.9 % bolus 250 mL (0 mL Intravenous Stopped 8/7/20 0323)       Imaging results:  Xr Chest 1 View    Result Date: 8/7/2020  Mild interstitial prominence could be chronic in nature versus some mild interstitial edema. This is somewhat exaggerated by suboptimal inspiration. 2. No focal infiltrates are seen.  This report was finalized on 8/7/2020 11:03 AM by Dr. Matthias Landeros M.D.        Ambulatory status:   - up with stand by assistance    Social issues:   Social History     Socioeconomic History   • Marital status:      Spouse name: Dimitri   • Number of children: 2   • Years of education: High school   • Highest education level: Not on file   Occupational History     Employer: RETIRED   Tobacco Use   • Smoking status: Former Smoker     Packs/day: 0.50     Years: 40.00     Pack years: 20.00     Types: Cigarettes     Start date: 10/31/2004   • Smokeless tobacco: Never Used   Substance and Sexual Activity   • Alcohol use: Not Currently     Frequency: Never   • Drug use: Never   • Sexual activity: Yes     Partners: Male     Birth control/protection: Post-menopausal, Surgical     Comment: .          Leeanna Amato, RN  08/07/20 8739

## 2020-08-07 NOTE — PLAN OF CARE
Problem: Patient Care Overview  Goal: Plan of Care Review  Outcome: Ongoing (interventions implemented as appropriate)  Flowsheets (Taken 8/7/2020 1815)  Progress: improving  Plan of Care Reviewed With: patient  Note:   Admit from ER for acute renal insufficiency and weakness. S/p fall, no fractures. Awaiting PT consult. Denies pain. Up with assist. Voiding per BRP. Hematology consult placed for hx anal cancer. Discussed bp med and monitoring r/t HTN. Plans to d/c home when stable.

## 2020-08-08 ENCOUNTER — APPOINTMENT (OUTPATIENT)
Dept: CT IMAGING | Facility: HOSPITAL | Age: 80
End: 2020-08-08

## 2020-08-08 LAB
ALBUMIN SERPL-MCNC: 2.7 G/DL (ref 3.5–5.2)
ALBUMIN/GLOB SERPL: 0.7 G/DL
ALP SERPL-CCNC: 187 U/L (ref 39–117)
ALT SERPL W P-5'-P-CCNC: 48 U/L (ref 1–33)
ANION GAP SERPL CALCULATED.3IONS-SCNC: 9.8 MMOL/L (ref 5–15)
AST SERPL-CCNC: 50 U/L (ref 1–32)
BACTERIA SPEC AEROBE CULT: NO GROWTH
BASOPHILS # BLD AUTO: 0.11 10*3/MM3 (ref 0–0.2)
BASOPHILS NFR BLD AUTO: 0.6 % (ref 0–1.5)
BILIRUB SERPL-MCNC: 0.4 MG/DL (ref 0–1.2)
BUN SERPL-MCNC: 31 MG/DL (ref 8–23)
BUN/CREAT SERPL: 20.8 (ref 7–25)
CALCIUM SPEC-SCNC: 9.8 MG/DL (ref 8.6–10.5)
CHLORIDE SERPL-SCNC: 107 MMOL/L (ref 98–107)
CHOLEST SERPL-MCNC: 123 MG/DL (ref 0–200)
CO2 SERPL-SCNC: 19.2 MMOL/L (ref 22–29)
CREAT SERPL-MCNC: 1.49 MG/DL (ref 0.57–1)
CRP SERPL-MCNC: 10 MG/DL (ref 0–0.5)
D-LACTATE SERPL-SCNC: 1.5 MMOL/L (ref 0.5–2)
DEPRECATED RDW RBC AUTO: 44.4 FL (ref 37–54)
EOSINOPHIL # BLD AUTO: 0.06 10*3/MM3 (ref 0–0.4)
EOSINOPHIL NFR BLD AUTO: 0.3 % (ref 0.3–6.2)
ERYTHROCYTE [DISTWIDTH] IN BLOOD BY AUTOMATED COUNT: 12.7 % (ref 12.3–15.4)
ERYTHROCYTE [SEDIMENTATION RATE] IN BLOOD: >140 MM/HR (ref 0–30)
FERRITIN SERPL-MCNC: 274 NG/ML (ref 13–150)
GFR SERPL CREATININE-BSD FRML MDRD: 34 ML/MIN/1.73
GLOBULIN UR ELPH-MCNC: 3.9 GM/DL
GLUCOSE SERPL-MCNC: 121 MG/DL (ref 65–99)
HCT VFR BLD AUTO: 29.8 % (ref 34–46.6)
HDLC SERPL-MCNC: 26 MG/DL (ref 40–60)
HGB BLD-MCNC: 9.5 G/DL (ref 12–15.9)
HGB RETIC QN AUTO: 30.5 PG (ref 29.8–36.1)
IMM GRANULOCYTES # BLD AUTO: 0.24 10*3/MM3 (ref 0–0.05)
IMM GRANULOCYTES NFR BLD AUTO: 1.2 % (ref 0–0.5)
IMM RETICS NFR: 27.8 % (ref 3–15.8)
IRON 24H UR-MRATE: 22 MCG/DL (ref 37–145)
IRON SATN MFR SERPL: 11 % (ref 20–50)
LDLC SERPL CALC-MCNC: 53 MG/DL (ref 0–100)
LDLC/HDLC SERPL: 2.02 {RATIO}
LYMPHOCYTES # BLD AUTO: 1.73 10*3/MM3 (ref 0.7–3.1)
LYMPHOCYTES NFR BLD AUTO: 8.9 % (ref 19.6–45.3)
MCH RBC QN AUTO: 30.3 PG (ref 26.6–33)
MCHC RBC AUTO-ENTMCNC: 31.9 G/DL (ref 31.5–35.7)
MCV RBC AUTO: 94.9 FL (ref 79–97)
MONOCYTES # BLD AUTO: 1.06 10*3/MM3 (ref 0.1–0.9)
MONOCYTES NFR BLD AUTO: 5.4 % (ref 5–12)
NEUTROPHILS NFR BLD AUTO: 16.26 10*3/MM3 (ref 1.7–7)
NEUTROPHILS NFR BLD AUTO: 83.6 % (ref 42.7–76)
NRBC BLD AUTO-RTO: 0 /100 WBC (ref 0–0.2)
PLATELET # BLD AUTO: 589 10*3/MM3 (ref 140–450)
PMV BLD AUTO: 8.9 FL (ref 6–12)
POTASSIUM SERPL-SCNC: 3.1 MMOL/L (ref 3.5–5.2)
PROT SERPL-MCNC: 6.6 G/DL (ref 6–8.5)
RBC # BLD AUTO: 3.14 10*6/MM3 (ref 3.77–5.28)
RETICS/RBC NFR AUTO: 1.88 % (ref 0.7–1.9)
SODIUM SERPL-SCNC: 136 MMOL/L (ref 136–145)
TIBC SERPL-MCNC: 200 MCG/DL (ref 298–536)
TRANSFERRIN SERPL-MCNC: 134 MG/DL (ref 200–360)
TRIGL SERPL-MCNC: 222 MG/DL (ref 0–150)
VIT B12 BLD-MCNC: 467 PG/ML (ref 211–946)
VLDLC SERPL-MCNC: 44.4 MG/DL (ref 5–40)
WBC # BLD AUTO: 19.46 10*3/MM3 (ref 3.4–10.8)

## 2020-08-08 PROCEDURE — 74177 CT ABD & PELVIS W/CONTRAST: CPT

## 2020-08-08 PROCEDURE — 97162 PT EVAL MOD COMPLEX 30 MIN: CPT

## 2020-08-08 PROCEDURE — 85046 RETICYTE/HGB CONCENTRATE: CPT | Performed by: INTERNAL MEDICINE

## 2020-08-08 PROCEDURE — 99215 OFFICE O/P EST HI 40 MIN: CPT | Performed by: INTERNAL MEDICINE

## 2020-08-08 PROCEDURE — 99203 OFFICE O/P NEW LOW 30 MIN: CPT | Performed by: INTERNAL MEDICINE

## 2020-08-08 PROCEDURE — G0378 HOSPITAL OBSERVATION PER HR: HCPCS

## 2020-08-08 PROCEDURE — 97110 THERAPEUTIC EXERCISES: CPT

## 2020-08-08 PROCEDURE — 87040 BLOOD CULTURE FOR BACTERIA: CPT | Performed by: INTERNAL MEDICINE

## 2020-08-08 PROCEDURE — 85025 COMPLETE CBC W/AUTO DIFF WBC: CPT | Performed by: NURSE PRACTITIONER

## 2020-08-08 PROCEDURE — 25010000002 IOPAMIDOL 61 % SOLUTION: Performed by: INTERNAL MEDICINE

## 2020-08-08 PROCEDURE — 83605 ASSAY OF LACTIC ACID: CPT | Performed by: INTERNAL MEDICINE

## 2020-08-08 PROCEDURE — 84466 ASSAY OF TRANSFERRIN: CPT | Performed by: INTERNAL MEDICINE

## 2020-08-08 PROCEDURE — 0 DIATRIZOATE MEGLUMINE & SODIUM PER 1 ML: Performed by: INTERNAL MEDICINE

## 2020-08-08 PROCEDURE — 83540 ASSAY OF IRON: CPT | Performed by: INTERNAL MEDICINE

## 2020-08-08 PROCEDURE — 96372 THER/PROPH/DIAG INJ SC/IM: CPT

## 2020-08-08 PROCEDURE — 82728 ASSAY OF FERRITIN: CPT | Performed by: INTERNAL MEDICINE

## 2020-08-08 PROCEDURE — 85652 RBC SED RATE AUTOMATED: CPT | Performed by: INTERNAL MEDICINE

## 2020-08-08 PROCEDURE — 80061 LIPID PANEL: CPT | Performed by: NURSE PRACTITIONER

## 2020-08-08 PROCEDURE — 86140 C-REACTIVE PROTEIN: CPT | Performed by: INTERNAL MEDICINE

## 2020-08-08 PROCEDURE — 80053 COMPREHEN METABOLIC PANEL: CPT | Performed by: NURSE PRACTITIONER

## 2020-08-08 PROCEDURE — 25010000002 ENOXAPARIN PER 10 MG: Performed by: INTERNAL MEDICINE

## 2020-08-08 RX ADMIN — IOPAMIDOL 85 ML: 612 INJECTION, SOLUTION INTRAVENOUS at 15:31

## 2020-08-08 RX ADMIN — DIATRIZOATE MEGLUMINE AND DIATRIZOATE SODIUM 30 ML: 600; 100 SOLUTION ORAL; RECTAL at 13:52

## 2020-08-08 RX ADMIN — ASPIRIN 81 MG: 81 TABLET, COATED ORAL at 08:45

## 2020-08-08 RX ADMIN — PRAVASTATIN SODIUM 40 MG: 40 TABLET ORAL at 21:27

## 2020-08-08 RX ADMIN — PANTOPRAZOLE SODIUM 40 MG: 40 TABLET, DELAYED RELEASE ORAL at 05:20

## 2020-08-08 RX ADMIN — ENOXAPARIN SODIUM 30 MG: 30 INJECTION SUBCUTANEOUS at 16:30

## 2020-08-08 RX ADMIN — SODIUM CHLORIDE, PRESERVATIVE FREE 10 ML: 5 INJECTION INTRAVENOUS at 08:45

## 2020-08-08 NOTE — PROGRESS NOTES
"Malnutrition Severity Assessment    Patient Name:  Benita Garcia  YOB: 1940  MRN: [id number]  Admit Date:  8/7/2020    Patient meets criteria for : Severe Malnutrition    Comments:  Pt's reported weight loss of 25 lb and poor energy intake since going through treatment for anal cancer Eduardo-Mar 2020 qualifies her for \"severel malnutrition.\"     Malnutrition Severity Assessment  Malnutrition Type: Chronic Disease - Related Malnutrition     Malnutrition Type (last 8 hours)      Malnutrition Severity Assessment     Row Name 08/08/20 1215       Malnutrition Severity Assessment    Malnutrition Type  Chronic Disease - Related Malnutrition    Row Name 08/08/20 1215       Insufficient Energy Intake     Insufficient Energy Intake Findings  Severe    Insufficient Energy Intake   <75% of est. energy requirement for > or equal to 3 months    Row Name 08/08/20 1215       Unintentional Weight Loss     Unintentional Weight Loss Findings  Severe    Unintentional Weight Loss   Weight loss greater than 10% in six months    Row Name 08/08/20 1215       Criteria Met (Must meet criteria for severity in at least 2 of these categories: M Wasting, Fat Loss, Fluid, Secondary Signs, Wt. Status, Intake)    Patient meets criteria for   Severe Malnutrition          Electronically signed by:  Angeilta Cortes RD  08/08/20 12:16  "

## 2020-08-08 NOTE — CONSULTS
"Adult Nutrition  Assessment/PES    Patient Name:  Benita Garcia  YOB: 1940  MRN: [id number]  Admit Date:  8/7/2020    Assessment Date:  8/8/2020    Comments: Consult for calorie count.    Needs: 1796 kcals, 71 gm protein    Pt with reported 25 lb weight loss from her baseline weight; review of EMR shows wt 148 lb in Feb 2020 and now 132 lb. C/o early satiety, loss of taste; she is s/p chemo and radiation from January through March for anal cancer. Boost plus has already been ordered TID. Food services will work with her on menu prefs. GI consulted, as well as oncology. Last PET scan showed no new disease.     RD to follow up next date with results of calorie count.   MSA completed - severe malnutrition based on poor energy intake, wt loss.      RD working remotely due to covid-19 pandemic. Assessment completed by review of electronic medical record. RD may be reached via secure chat or email.     Reason for Assessment     Row Name 08/08/20 1200          Reason for Assessment    Reason For Assessment  calorie count order;nurse/nurse practitioner consult;physician consult     Diagnosis  cardiac disease;cancer diagnosis/related complications;other (see comments) Fall at home, general weakness; hx anal cancer, CAD, HTN, weight loss     Identified At Risk by Screening Criteria  unintentional loss of 10 lbs or more in the past 2 mos;MST SCORE 2+         Nutrition/Diet History     Row Name 08/08/20 1203          Nutrition/Diet History    Typical Food/Fluid Intake  Intake over past 7-8 mos poor 2* to going thru chemo, XRT for anal cancer. Looks like most weight loss occured in March and has steadily decreased since. C/o loss of taste, early satiety, variable appetite. Hx/o reflux but takes meds for this. GI MD wants calorie count.          Anthropometrics     Row Name 08/08/20 1207          Anthropometrics    Height  154.9 cm (61\")        Admit Weight    Admit Weight  57.2 kg (126 lb)        Ideal Body " "Weight (IBW)    Ideal Body Weight (IBW) (kg)  48.15     % of Ideal Body Weight Assessment  not applicable        Usual Body Weight (UBW)    Usual Body Weight  67.1 kg (148 lb) 2/17/20     % of Usual Body Weight Assessment  75-84% - moderate deficit     Weight Loss  unintentional 16#     Weight Loss Time Frame  10.8% weight loss (16 lb) since Feb 2020; pt reports 25 lb loss from UBW        Body Mass Index (BMI)    BMI Assessment  BMI 25-29.9: overweight BMI 25         Labs/Tests/Procedures/Meds     Row Name 08/08/20 1209          Labs/Procedures/Meds    Lab Results Reviewed  reviewed     Lab Results Comments  GLu, BUN, Cr, TG, ALT, AST, CRP^        Diagnostic Tests/Procedures    Diagnostic Test/Procedure Reviewed  reviewed     Diagnostic Test/Procedures Comments  CXR, CT abd pending; PET scan 6/2020 - no new disease        Medications    Pertinent Medications Reviewed  reviewed     Pertinent Medications Comments  antiemetic, IVF 50 cc/hr, ppi, asa, pravastatin         Physical Findings     Row Name 08/08/20 1212          Physical Findings    Skin  other (see comments) Luis Alberto 20, skin intact         Estimated/Assessed Needs     Row Name 08/08/20 1212 08/08/20 1207       Calculation Measurements    Weight Used For Calculations  59.9 kg (132 lb)  --    Height  --  154.9 cm (61\")       Estimated/Assessed Needs    Additional Documentation  Fluid Requirements (Group);KCAL/KG (Group);Protein Requirements (Group)  --       KCAL/KG    KCAL/KG  30 Kcal/Kg (kcal)  --    30 Kcal/Kg (kcal)  1796.25  --       Protein Requirements    Weight Used For Protein Calculations  59.9 kg (132 lb)  --    Est Protein Requirement Amount (gms/kg)  1.2 gm protein  --    Estimated Protein Requirements (gms/day)  71.85  --       Fluid Requirements    Estimated Fluid Requirements (mL/day)  1500  --    Estimated Fluid Requirement Method  RDA Method  --    RDA Method (mL)  1500  --        Nutrition Prescription Ordered     Row Name 08/08/20 1214    "       Nutrition Prescription PO    Current PO Diet  Regular     Fluid Consistency  Thin     Supplement  Boost Plus (Ensure Enlive, Ensure Plus)     Supplement Frequency  3 times a day         Evaluation of Received Nutrient/Fluid Intake     Row Name 08/08/20 1214          Fluid Intake Evaluation    IV Fluid (mL)  1200        PO Evaluation    Number of Days PO Intake Evaluated  Insufficient Data           Malnutrition Severity Assessment     Row Name 08/08/20 1215          Malnutrition Severity Assessment    Malnutrition Type  Chronic Disease - Related Malnutrition        Insufficient Energy Intake     Insufficient Energy Intake Findings  Severe     Insufficient Energy Intake   <75% of est. energy requirement for > or equal to 3 months        Unintentional Weight Loss     Unintentional Weight Loss Findings  Severe     Unintentional Weight Loss   Weight loss greater than 10% in six months        Criteria Met (Must meet criteria for severity in at least 2 of these categories: M Wasting, Fat Loss, Fluid, Secondary Signs, Wt. Status, Intake)    Patient meets criteria for   Severe Malnutrition           Problem/Interventions:  Problem 1     Row Name 08/08/20 1218          Nutrition Diagnoses Problem 1    Problem 1  Malnutrition     Etiology (related to)  Medical Diagnosis     Oncology  Other (comment) anal cancer - treatment from 1/20-3/20     Appetite  Early Satiety;Poor     Reported GI Symptoms  Altered GI Function     Other  Loss of taste     Signs/Symptoms (evidenced by)  Report of Mnimal PO Intake;% UBW;Unintended Weight Change     Percent (%) UBW  89 %     Unintended Weight Change  Loss     Number of Pounds Lost  16# since Feb. 2020; pt reports total 25 lb loss from baseline wt               Intervention Goal     Row Name 08/08/20 1220          Intervention Goal    General  Maintain nutrition;Meet nutritional needs for age/condition;Disease management/therapy;Reduce/improve symptoms     PO  Establish PO;Meet  estimated needs;PO intake (%)     PO Intake %  75 %     Weight  Maintain weight         Nutrition Intervention     Row Name 08/08/20 1221          Nutrition Intervention    RD/Tech Action  Follow Tx progress;Care plan reviewd;Other (comment) calorie count ordered/started today           Education/Evaluation     Row Name 08/08/20 1221          Education    Education  Will Instruct as appropriate        Monitor/Evaluation    Monitor  Per protocol;I&O;PO intake;Supplement intake;Pertinent labs;Food/activity diary;Symptoms;Skin status;Weight           Electronically signed by:  Angelita Cortes RD  08/08/20 12:21

## 2020-08-08 NOTE — PLAN OF CARE
Problem: Patient Care Overview  Goal: Plan of Care Review  Outcome: Ongoing (interventions implemented as appropriate)  Flowsheets (Taken 8/8/2020 9050)  Progress: improving  Plan of Care Reviewed With: patient  Outcome Summary: Patient had a stable nght. vitals stable. Denies pain. Voiding per BRP. Ambulates to bathroom with assist x1 with walker. Awaiting GI and Hematology consult. Will continue to monitor.

## 2020-08-08 NOTE — PLAN OF CARE
Pt admitted after fall at home, she says she fell because of weakness that has been going on for months she relates her cancer treatments contributing to worsening weakness/fatigue. No prior history of fall. She complains of mild bilateral calf aches/pains. I gave recommendation to her to use walker at home for safety and some outpatient physical therapy to improve strength, balance, activity tolerance and endurance.

## 2020-08-08 NOTE — CONSULTS
[facility] INITIAL INPATIENT CONSULTATION NOTE    REASON FOR CONSULTATION:    History of anal/rectal cancer, squamous cell carcinoma  Decreased appetite, weight loss, fall  Provide an opinion regarding further evaluation and treatment    HISTORY OF PRESENT ILLNESS:  Benita Garcia is a 80 y.o. female who we are asked to see today in consultation for history of anal cancer.    She has been followed by Dr. Gray with my group.    History of squamous cell anal/rectal cancer.  Initiation of concurrent chemotherapy and radiation with Xeloda and mitomycin on 2/12/2020 with mitomycin administered on 2/17/2020.  This was her only dose of mitomycin.  Completed therapy on 3/27/2020.    She followed up with Dr. Stack 4/29/2020 with improvement in her symptoms and improvement in the size of the tumor on physical examination.    Follow-up with Dr. Stack on 6/15/2020 with no residual tumor visible on examination.    Follow-up PET scan 6/16/2020 with a significant decrease in FDG uptake in the distal rectum and anus with no evidence of metastatic disease in the chest abdomen or pelvis.  Intense mediastinal bilateral hilar lymphadenopathy unchanged from prior imaging thought to be reactive.  She saw Dr. Gray on 6/18/2020.  She was doing well at that time aside from some ongoing perianal pain treated with Aquaphor.  Follow-up CT imaging in 6 months planned.       She presented to the emergency department 8/7/2020 due to weakness and a fall.      Complains of weight loss and anorexia.  She has lost maybe about 20 pounds in the past few months.  Complains of early satiety.  Complains of dysgeusia.  Denies food impaction.  No respiratory symptoms.  She was weak when trying to get into the bathroom prior to admission and had a mechanical fall.  She denies syncope.  She denies hitting her head.  She has no injuries from this.    She was afebrile and normotensive with normal oxygen saturation on room air.  Cardiac  enzymes negative.  Creatinine 1.65.  Liver labs slightly elevated with an ALT of 44 and AST 37.  Normal bilirubin at 0.3.  Magnesium normal at 1.9.  White blood cell count elevated at 17.07 with mild anemia, hemoglobin 10.2 and mild thrombocytosis, platelets 622,000.  Procalcitonin normal at 0.22.  Urinalysis moderate blood, small leukocytes, 100 mg protein, 6-12 red cells, 13-20 white blood cells.  No bacteria visualized.  Urine culture pending.  Blood cultures pending.    A chest x-ray showed no definite acute findings or evidence for pneumonia.    Past Medical History:   Diagnosis Date   • Anal cancer (CMS/HCC) 01/2020    SQUAMOUS CELL CARCINOMA, FOLLOWED BY DR. JOVITA BAUTISTA   • Aortic atherosclerosis (CMS/HCC)    • Arthritis    • Boil    • Chronic constipation    • Colon polyps     FOLLOWED BY DR. JOVITA BAUTISTA   • Depression    • Encounter for screening mammogram for breast cancer     09/2014, 10/2014, 11/2015, 11/2016, 11/2017, 11/2018, 11/2019.   • Exudative senile macular retinal degeneration (CMS/HCC)    • GERD (gastroesophageal reflux disease)     FOLLOWED BY DR. IMTIAZ SELLERS   • Hemorrhoids    • History of chemotherapy 2020    FOR ANAL CANCER, FOLLOWED BY DR. KASHMIR ARITA   • History of radiation therapy 2020    FOLLOWED BY DR. 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       Past Surgical History:   Procedure Laterality Date   • COLONOSCOPY W/ POLYPECTOMY N/A 01/03/2020    LARGE MASS IN ANORECTAL AREA, BX: SQUAMOUS CELL CARCINOMA, 3 TUBULAR ADENOMA POLYPS IN ASCENDING, 3 TUBULAR ADENOMA POLYPS IN TRANSVERSE, MELANOSIS COLI, DR. IMTIAZ SELLERS AT [facility]    • ENDOSCOPY N/A 01/03/2020    DR. IMTIAZ SELLERS AT [facility]   •  HYSTERECTOMY N/A 1993       SOCIAL HISTORY:   reports that she has quit smoking. Her smoking use included cigarettes. She started smoking about 15 years ago. She has a 20.00 pack-year smoking history. She has never used smokeless tobacco. She reports that she drank alcohol. She reports that she does not use drugs.    FAMILY HISTORY:  family history includes Brain cancer in her sister; Cancer in her sister, sister, sister, and sister; Heart disease in her brother and father; Hypertension in her brother; Lung cancer in her sister and sister; Lung cancer (age of onset: 61) in her sister; Lung cancer (age of onset: 72) in her sister.    ALLERGIES:  Allergies   Allergen Reactions   • Codeine Rash   • Lipitor [Atorvastatin] Rash       MEDICATIONS:  As listed in the electronic medical record.    Review of Systems   Constitutional: Positive for activity change, fatigue and unexpected weight change.   Gastrointestinal:        Early satiety.  Taste changes.   Neurological: Positive for weakness.   All other systems reviewed and are negative.      Vitals:    08/07/20 1849 08/07/20 2352 08/08/20 0339 08/08/20 0655   BP: 149/77 142/75 133/80 127/72   BP Location: Right arm Right arm Right arm Right arm   Patient Position: Sitting Lying Lying Lying   Pulse: 100 104 97 96   Resp: 16 16 16 16   Temp: 99.1 °F (37.3 °C) 99.1 °F (37.3 °C) 97.8 °F (36.6 °C) 98.7 °F (37.1 °C)   TempSrc: Skin Skin Skin Skin   SpO2: 93% 91% 91% 94%   Weight:       Height:           Physical Exam   Constitutional: She is oriented to person, place, and time. She appears well-developed and well-nourished.   HENT:   Head: Normocephalic and atraumatic.   Nose: Nose normal.   Eyes: Pupils are equal, round, and reactive to light. Conjunctivae and EOM are normal.   Neck: Neck supple.   Cardiovascular: Normal rate, regular rhythm, S1 normal, S2 normal and normal heart sounds. Exam reveals no gallop and no friction rub.   No murmur heard.  Pulmonary/Chest: Effort  normal and breath sounds normal. No stridor. No respiratory distress. She has no wheezes. She has no rhonchi. She has no rales. She exhibits no tenderness.   Abdominal: Soft. Bowel sounds are normal. She exhibits no distension and no mass. There is no tenderness. There is no rebound and no guarding.   Musculoskeletal: Normal range of motion. She exhibits no edema.   Lymphadenopathy:     She has no cervical adenopathy.     She has no axillary adenopathy.        Right: No inguinal and no supraclavicular adenopathy present.        Left: No inguinal and no supraclavicular adenopathy present.   Neurological: She is alert and oriented to person, place, and time. No cranial nerve deficit or sensory deficit.   Skin: Skin is warm and dry. No rash noted. No erythema.   Psychiatric: She has a normal mood and affect. Her behavior is normal. Judgment and thought content normal.   Vitals reviewed.      DIAGNOSTIC DATA:  WBC   Date Value Ref Range Status   08/08/2020 19.46 (H) 3.40 - 10.80 10*3/mm3 Final     RBC   Date Value Ref Range Status   08/08/2020 3.14 (L) 3.77 - 5.28 10*6/mm3 Final     Hemoglobin   Date Value Ref Range Status   08/08/2020 9.5 (L) 12.0 - 15.9 g/dL Final     Hematocrit   Date Value Ref Range Status   08/08/2020 29.8 (L) 34.0 - 46.6 % Final     MCV   Date Value Ref Range Status   08/08/2020 94.9 79.0 - 97.0 fL Final     MCH   Date Value Ref Range Status   08/08/2020 30.3 26.6 - 33.0 pg Final     MCHC   Date Value Ref Range Status   08/08/2020 31.9 31.5 - 35.7 g/dL Final     RDW   Date Value Ref Range Status   08/08/2020 12.7 12.3 - 15.4 % Final     RDW-SD   Date Value Ref Range Status   08/08/2020 44.4 37.0 - 54.0 fl Final     MPV   Date Value Ref Range Status   08/08/2020 8.9 6.0 - 12.0 fL Final     Platelets   Date Value Ref Range Status   08/08/2020 589 (H) 140 - 450 10*3/mm3 Final     Neutrophil %   Date Value Ref Range Status   08/08/2020 83.6 (H) 42.7 - 76.0 % Final     Lymphocyte %   Date Value Ref  Range Status   08/08/2020 8.9 (L) 19.6 - 45.3 % Final     Monocyte %   Date Value Ref Range Status   08/08/2020 5.4 5.0 - 12.0 % Final     Eosinophil %   Date Value Ref Range Status   08/08/2020 0.3 0.3 - 6.2 % Final     Basophil %   Date Value Ref Range Status   08/08/2020 0.6 0.0 - 1.5 % Final     Immature Grans %   Date Value Ref Range Status   08/08/2020 1.2 (H) 0.0 - 0.5 % Final     Neutrophils, Absolute   Date Value Ref Range Status   08/08/2020 16.26 (H) 1.70 - 7.00 10*3/mm3 Final     Lymphocytes, Absolute   Date Value Ref Range Status   08/08/2020 1.73 0.70 - 3.10 10*3/mm3 Final     Monocytes, Absolute   Date Value Ref Range Status   08/08/2020 1.06 (H) 0.10 - 0.90 10*3/mm3 Final     Eosinophils, Absolute   Date Value Ref Range Status   08/08/2020 0.06 0.00 - 0.40 10*3/mm3 Final     Basophils, Absolute   Date Value Ref Range Status   08/08/2020 0.11 0.00 - 0.20 10*3/mm3 Final     Immature Grans, Absolute   Date Value Ref Range Status   08/08/2020 0.24 (H) 0.00 - 0.05 10*3/mm3 Final     nRBC   Date Value Ref Range Status   08/08/2020 0.0 0.0 - 0.2 /100 WBC Final       Glucose   Date Value Ref Range Status   08/08/2020 121 (H) 65 - 99 mg/dL Final     Sodium   Date Value Ref Range Status   08/08/2020 136 136 - 145 mmol/L Final     Potassium   Date Value Ref Range Status   08/08/2020 3.1 (L) 3.5 - 5.2 mmol/L Final     CO2   Date Value Ref Range Status   08/08/2020 19.2 (L) 22.0 - 29.0 mmol/L Final     Chloride   Date Value Ref Range Status   08/08/2020 107 98 - 107 mmol/L Final     Anion Gap   Date Value Ref Range Status   08/08/2020 9.8 5.0 - 15.0 mmol/L Final     Creatinine   Date Value Ref Range Status   08/08/2020 1.49 (H) 0.57 - 1.00 mg/dL Final     BUN   Date Value Ref Range Status   08/08/2020 31 (H) 8 - 23 mg/dL Final     BUN/Creatinine Ratio   Date Value Ref Range Status   08/08/2020 20.8 7.0 - 25.0 Final     Calcium   Date Value Ref Range Status   08/08/2020 9.8 8.6 - 10.5 mg/dL Final     eGFR Non   Amer   Date Value Ref Range Status   08/08/2020 34 (L) >60 mL/min/1.73 Final     Alkaline Phosphatase   Date Value Ref Range Status   08/08/2020 187 (H) 39 - 117 U/L Final     Total Protein   Date Value Ref Range Status   08/08/2020 6.6 6.0 - 8.5 g/dL Final     ALT (SGPT)   Date Value Ref Range Status   08/08/2020 48 (H) 1 - 33 U/L Final     AST (SGOT)   Date Value Ref Range Status   08/08/2020 50 (H) 1 - 32 U/L Final     Total Bilirubin   Date Value Ref Range Status   08/08/2020 0.4 0.0 - 1.2 mg/dL Final     Albumin   Date Value Ref Range Status   08/08/2020 2.70 (L) 3.50 - 5.20 g/dL Final     Globulin   Date Value Ref Range Status   08/08/2020 3.9 gm/dL Final         IMAGING: I personally reviewed her peripheral blood smear.  Increased population of mature appearing neutrophils.  No blasts are visible.  Increased platelet numbers.  Red cells appear normal.    Assessment/Plan   ASSESSMENT:  This is a 80 y.o. female with:    · *History of squamous cell anal cancer.  Initiation of concurrent chemotherapy and radiation with Xeloda and mitomycin on 2/12/2020 with mitomycin administered on 2/17/2020.  This was her only dose of mitomycin.    · Completed therapy on 3/27/2020.  · She followed up with Dr. Stack 4/29/2020 with improvement in her symptoms and improvement in the size of the tumor on physical examination.  · Follow-up with Dr. Stack on 6/15/2020 with no residual tumor visible on examination.  · Follow-up PET scan 6/16/2020 with a significant decrease in FDG uptake in the distal rectum and anus with no evidence of metastatic disease in the chest abdomen or pelvis.  Intense mediastinal bilateral hilar lymphadenopathy unchanged from prior imaging thought to be reactive.    · She saw Dr. Gray on 6/18/2020.  She was doing well at that time aside from some ongoing perianal pain treated with Aquaphor.  Follow-up CT imaging in 6 months planned.       *Normocytic anemia  · Admission hemoglobin 10.2, MCV  91.8  · Baseline hemoglobin normal between 12 and 13 February 2020  · Gradually worsening anemia likely secondary to chemotherapy  · Hemoglobin had improved at 11.0 as of 6/18/2020  · Further evaluation with labs    *Thrombocytosis  · Baseline platelet count has been normal  · Admission platelets 6 8/7/2020 622,000  · Could be reactive thrombocytosis.  Myeloproliferative disorder possible.    *Leukocytosis with neutrophilia  · Baseline white blood cell count around 11,000  · White blood cell count normalized during chemotherapy  · White blood cell count 13.86 on 6/18/2020 with 74% neutrophils and 15% lymphocytes  · Admission white blood cell count on 8/7/2020 was 17.07 with 85% neutrophils and 7% lymphocytes  · Potentially reactive but possibility of a myeloproliferative disorder    *Weakness, weight loss, early satiety  · Gastroenterology has evaluated.  She prefers not to have an EGD at this time.    *Admitted status post fall secondary to weakness: No acute injuries    RECOMMENDATIONS/PLAN:   *Follow-up CT imaging of the abdomen and pelvis  *Evaluate for myeloproliferative disorder with peripheral blood flow cytometry, Bertin 2 and FISH for BCR-ABL.  *Anemia evaluation with ferritin, iron studies, reticulocyte count, vitamin B12 level, RBC folate level  Daily CBC    Discussed with her at the bedside    Omi Laguerre MD

## 2020-08-08 NOTE — PROGRESS NOTES
[facility] HOSPITALIST    PROGRESS NOTE    Name:  Benita Garcia   Age:  80 y.o.  Sex:  female  :  1940  MRN:  [id number]   Visit Number:  [id number]  Admission Date:  2020  Date Of Service:  20  Primary Care Physician:  Rosa Christianson MD     LOS: 0 days :  Patient Care Team:  Rosa Christianson MD as PCP - General (Internal Medicine)  Zen Franco MD as Consulting Physician (Gastroenterology)  Ezequiel Gray MD as Consulting Physician (Hematology and Oncology)  Rosa Christianson MD as Referring Physician (Internal Medicine)  Mariana Beyer MD as Consulting Physician (Radiation Oncology):    History taken from:     patient chart family    Chief Complaint:      Weight loss.    Subjective     Interval History:     Patient seen and examined again today.  Reviewed consults.    Patient has had 25 pound weight loss since completing her chemoradiation for anal cancer in 2020.  She had a PET scan in 2020 which showed improvement.  She claims that she does not want to eat, and she also claims that she has early satiety.  She cannot taste food, but she does try to eat.  But she takes 1 or 2 bites and she feels full.  She denies any food getting stuck midesophagus.  She does have a history of reflux.  She takes Protonix for this.  She had a EGD in the past but is many years ago.  Her initial creatinine was 1.65, and is now 1.4 now with IV fluids.  Iron studies were ordered which suggest chronic disease.  CRP is elevated as is WBC.  Patient has thrombocytosis as well.    Lactate was normal.  Sed rate is greater than 140 however.  Oncology was consulted, given her history of anal cancer and her blood abnormalities.  Oncology is ruling out myeloma proliferative disorder, with peripheral blood flow cytometry,.Bertin 2 and FISH for BCR-ABL.    Gastroenterology was consulted, they recommended EGD, however the patient refused to this.  Advised her  to get this given her weight loss and her early satiety.  Nutritionist seen the patient and has deemed her to have severe malnutrition.    Review of Systems:     All systems were reviewed and negative except for:  Gastrointestinal: positive for  early satiety    Objective     Vital Signs:    Temp:  [97.6 °F (36.4 °C)-99.1 °F (37.3 °C)] 97.6 °F (36.4 °C)  Heart Rate:  [] 90  Resp:  [16] 16  BP: (125-176)/(70-92) 125/70    Physical Exam:    General: Alert and oriented x4, no acute distress  Heart: Regular rate and rhythm without murmur throw  Lungs: Clear to auscultation bilaterally without use of accessory muscles respiration  Abdomen: Soft/nontender/nondistended, no hepatosplenomegaly is noted.  MSK: 5/5 strength in upper/lower extremities bilaterally     Results Review:      I reviewed the patient's new clinical results.    Labs:    Lab Results (last 24 hours)     Procedure Component Value Units Date/Time    Vitamin B12 [[id number]]  (Normal) Collected:  08/07/20 1039    Specimen:  Blood Updated:  08/08/20 1328     Vitamin B-12 467 pg/mL     Narrative:       Results may be falsely increased if patient taking Biotin.      Ferritin [[id number]]  (Abnormal) Collected:  08/08/20 0658    Specimen:  Blood Updated:  08/08/20 1319     Ferritin 274.00 ng/mL     Narrative:       Results may be falsely decreased if patient taking Biotin.      Iron Profile [[id number]]  (Abnormal) Collected:  08/08/20 0658    Specimen:  Blood Updated:  08/08/20 1316     Iron 22 mcg/dL      Iron Saturation 11 %      Transferrin 134 mg/dL      TIBC 200 mcg/dL     Sedimentation Rate [[id number]]  (Abnormal) Collected:  08/08/20 0658    Specimen:  Blood Updated:  08/08/20 1053     Sed Rate >140 mm/hr     Lactic Acid, Plasma [[id number]]  (Normal) Collected:  08/08/20 1022    Specimen:  Blood from Arm, Right Updated:  08/08/20 1045     Lactate 1.5 mmol/L     C-reactive Protein [[id number]]  (Abnormal) Collected:  08/08/20 0658    Specimen:  Blood  Updated:  08/08/20 1043     C-Reactive Protein 10.00 mg/dL     Urine Culture - Urine, Urine, Clean Catch [[id number]]  (Normal) Collected:  08/07/20 1141    Specimen:  Urine, Clean Catch Updated:  08/08/20 1043     Urine Culture No growth    Blood Culture - Blood, Arm, Right [[id number]] Collected:  08/08/20 1022    Specimen:  Blood from Arm, Right Updated:  08/08/20 1029    Comprehensive Metabolic Panel [[id number]]  (Abnormal) Collected:  08/08/20 0658    Specimen:  Blood Updated:  08/08/20 0755     Glucose 121 mg/dL      BUN 31 mg/dL      Creatinine 1.49 mg/dL      Sodium 136 mmol/L      Potassium 3.1 mmol/L      Chloride 107 mmol/L      CO2 19.2 mmol/L      Calcium 9.8 mg/dL      Total Protein 6.6 g/dL      Albumin 2.70 g/dL      ALT (SGPT) 48 U/L      AST (SGOT) 50 U/L      Alkaline Phosphatase 187 U/L      Total Bilirubin 0.4 mg/dL      eGFR Non African Amer 34 mL/min/1.73      Globulin 3.9 gm/dL      A/G Ratio 0.7 g/dL      BUN/Creatinine Ratio 20.8     Anion Gap 9.8 mmol/L     Narrative:       GFR Normal >60  Chronic Kidney Disease <60  Kidney Failure <15      Lipid Panel [[id number]]  (Abnormal) Collected:  08/08/20 0658    Specimen:  Blood Updated:  08/08/20 0755     Total Cholesterol 123 mg/dL      Triglycerides 222 mg/dL      HDL Cholesterol 26 mg/dL      LDL Cholesterol  53 mg/dL      VLDL Cholesterol 44.4 mg/dL      LDL/HDL Ratio 2.02    Narrative:       Cholesterol Reference Ranges  (U.S. Department of Health and Human Services ATP III Classifications)    Desirable          <200 mg/dL  Borderline High    200-239 mg/dL  High Risk          >240 mg/dL      Triglyceride Reference Ranges  (U.S. Department of Health and Human Services ATP III Classifications)    Normal           <150 mg/dL  Borderline High  150-199 mg/dL  High             200-499 mg/dL  Very High        >500 mg/dL    HDL Reference Ranges  (U.S. Department of Health and Human Services ATP III Classifcations)    Low     <40 mg/dl (major risk  factor for CHD)  High    >60 mg/dl ('negative' risk factor for CHD)        LDL Reference Ranges  (U.S. Department of Health and Human Services ATP III Classifcations)    Optimal          <100 mg/dL  Near Optimal     100-129 mg/dL  Borderline High  130-159 mg/dL  High             160-189 mg/dL  Very High        >189 mg/dL    CBC Auto Differential [[id number]]  (Abnormal) Collected:  08/08/20 0658    Specimen:  Blood Updated:  08/08/20 0739     WBC 19.46 10*3/mm3      RBC 3.14 10*6/mm3      Hemoglobin 9.5 g/dL      Hematocrit 29.8 %      MCV 94.9 fL      MCH 30.3 pg      MCHC 31.9 g/dL      RDW 12.7 %      RDW-SD 44.4 fl      MPV 8.9 fL      Platelets 589 10*3/mm3      Neutrophil % 83.6 %      Lymphocyte % 8.9 %      Monocyte % 5.4 %      Eosinophil % 0.3 %      Basophil % 0.6 %      Immature Grans % 1.2 %      Neutrophils, Absolute 16.26 10*3/mm3      Lymphocytes, Absolute 1.73 10*3/mm3      Monocytes, Absolute 1.06 10*3/mm3      Eosinophils, Absolute 0.06 10*3/mm3      Basophils, Absolute 0.11 10*3/mm3      Immature Grans, Absolute 0.24 10*3/mm3      nRBC 0.0 /100 WBC     POC Glucose Once [[id number]]  (Normal) Collected:  08/07/20 2212    Specimen:  Blood Updated:  08/07/20 2214     Glucose 117 mg/dL     Procalcitonin [[id number]]  (Normal) Collected:  08/07/20 1039    Specimen:  Blood Updated:  08/07/20 1828     Procalcitonin 0.22 ng/mL     Narrative:       As a Marker for Sepsis (Non-Neonates):   1. <0.5 ng/mL represents a low risk of severe sepsis and/or septic shock.  1. >2 ng/mL represents a high risk of severe sepsis and/or septic shock.    As a Marker for Lower Respiratory Tract Infections that require antibiotic therapy:  PCT on Admission     Antibiotic Therapy             6-12 Hrs later  > 0.5                Strongly Recommended            >0.25 - <0.5         Recommended  0.1 - 0.25           Discouraged                   Remeasure/reassess PCT  <0.1                 Strongly Discouraged           "Remeasure/reassess PCT      As 28 day mortality risk marker: \"Change in Procalcitonin Result\" (> 80 % or <=80 %) if Day 0 (or Day 1) and Day 4 values are available. Refer to http://www.[facility]-pct-calculator.com/   Change in PCT <=80 %   A decrease of PCT levels below or equal to 80 % defines a positive change in PCT test result representing a higher risk for 28-day all-cause mortality of patients diagnosed with severe sepsis or septic shock.  Change in PCT > 80 %   A decrease of PCT levels of more than 80 % defines a negative change in PCT result representing a lower risk for 28-day all-cause mortality of patients diagnosed with severe sepsis or septic shock.                Results may be falsely decreased if patient taking Biotin.     Blood Culture - Blood, Arm, Left [[id number]] Collected:  08/07/20 1741    Specimen:  Blood from Arm, Left Updated:  08/07/20 1754    Blood Culture - Blood, Arm, Right [[id number]] Collected:  08/07/20 1741    Specimen:  Blood from Arm, Right Updated:  08/07/20 1753    COVID PRE-OP / PRE-PROCEDURE SCREENING ORDER (NO ISOLATION) - Swab, Nasopharynx [[id number]] Collected:  08/07/20 1402    Specimen:  Swab from Nasopharynx Updated:  08/07/20 7827    Narrative:       The following orders were created for panel order COVID PRE-OP / PRE-PROCEDURE SCREENING ORDER (NO ISOLATION) - Swab, Nasopharynx.  Procedure                               Abnormality         Status                     ---------                               -----------         ------                     Respiratory Panel PCR w/...[[id number]]  Normal              Final result                 Please view results for these tests on the individual orders.    Respiratory Panel PCR w/COVID-19(SARS-CoV-2) SHAHRZAD/ALEX/PATRICIA In-House, NP Swab in [facility], 3-4 Hr TAT - Swab, Nasopharynx [[id number]]  (Normal) Collected:  08/07/20 1402    Specimen:  Swab from Nasopharynx Updated:  08/07/20 2550     ADENOVIRUS, PCR Not Detected     Coronavirus " 229E Not Detected     Coronavirus HKU1 Not Detected     Coronavirus NL63 Not Detected     Coronavirus OC43 Not Detected     COVID19 Not Detected     Human Metapneumovirus Not Detected     Human Rhinovirus/Enterovirus Not Detected     Influenza A PCR Not Detected     Influenza A H1 Not Detected     Influenza A H1 2009 PCR Not Detected     Influenza A H3 Not Detected     Influenza B PCR Not Detected     Parainfluenza Virus 1 Not Detected     Parainfluenza Virus 2 Not Detected     Parainfluenza Virus 3 Not Detected     Parainfluenza Virus 4 Not Detected     RSV, PCR Not Detected     Bordetella pertussis pcr Not Detected     Bordetella parapertussis PCR Not Detected     Chlamydophila pneumoniae PCR Not Detected     Mycoplasma pneumo by PCR Not Detected    Narrative:       Fact sheet for providers: https://docs.[employer]/wp-content/uploads/MKN2831-4820-HE9.1-EUA-Provider-Fact-Sheet-3.pdf    Fact sheet for patients: https://docs.[employer]/wp-content/uploads/TMP6169-3059-OA0.1-EUA-Patient-Fact-Sheet-1.pdf           Radiology:    Imaging Results (Last 24 Hours)     ** No results found for the last 24 hours. **          Medication Review:       aspirin 81 mg Oral Daily   pantoprazole 40 mg Oral Q AM   pravastatin 40 mg Oral Nightly   sodium chloride 10 mL Intravenous Q12H         sodium chloride 50 mL/hr Last Rate: 50 mL/hr (08/07/20 1821)       Assessment/Plan     Problem List Items Addressed This Visit        Other    Fall - Primary      Other Visit Diagnoses     Acute renal insufficiency        Elevated liver function tests              1.  Unintentional weight loss.  2.  Early satiety.  3.  Recent rectal cancer status post chemoradiation completed March 2020.  4.  Essential hypertension, improved  5.  History of pulmonary hypertension.  6.  Hyperlipidemia.  7.  Leukocytosis with out any obvious sign of infection.  COVID-19 was negative.  8.  Thrombocytosis  9.  Anemia  10.  Severe malnutrition  11.  Elevated  LFTs.    Plan:    Oncology has been consulted, they have proceeded with work-up on her leukocytosis, thrombocytosis, anemia.  Gastroenterology has recommended EGD, strongly encourage patient to get this.  Continue with PT and OT.  Continue to monitor lab work.  Blood pressures improved with her current blood pressure medications.  Await abdominal CT results.  Urinalysis was negative.  Electrolyte replacement protocol.  He had Lovenox for DVT prophylaxis.  Lab work in the a.m.  Further recommendations will depend on the clinical course.    Adair Lara DO  08/08/20  14:41

## 2020-08-08 NOTE — CONSULTS
[facility]  Initial Inpatient Consult Note    Referring Provider: Rosa Christianson MD    Reason for Consultation: Early satiety, weight loss    Subjective     History of present illness:    80 y.o. female diagnosed with anal cancer in January and subsequent chemo and radiation therapy administered through March of this year.  She notes a weight loss of approximately 25 pounds since her diagnosis 7 months ago.  She admits to early satiety with a feeling of fullness after a few bites of food.  She denies any nausea or vomiting or dysphagia.  She does have a history of reflux which is well controlled with pantoprazole.  She has had previous upper endoscopy but cannot recall the specific dates, per her recollection there were no abnormalities seen.  Family history is negative for peptic ulcer disease or upper GI cancers.  She states her appetite waxes and wanes, she has lost the taste for food.  She denies any specific abdominal pain.  She believes her symptoms are related to a psychologic influence and not associated with any specific anatomic problem.  Bowel pattern is intact per her account.  She had previous issues with constipation according to review of chart records.    Past Medical History:  Past Medical History:   Diagnosis Date   • Anal cancer (CMS/Formerly [facility]) 01/2020    SQUAMOUS CELL CARCINOMA, FOLLOWED BY DR. JOVITA BAUTISTA   • Aortic atherosclerosis (CMS/Formerly [facility])    • Arthritis    • Boil    • Chronic constipation    • Colon polyps     FOLLOWED BY DR. JOVITA BAUTISTA   • Depression    • Encounter for screening mammogram for breast cancer     09/2014, 10/2014, 11/2015, 11/2016, 11/2017, 11/2018, 11/2019.   • Exudative senile macular retinal degeneration (CMS/Formerly [facility])    • GERD (gastroesophageal reflux disease)     FOLLOWED BY DR. IMTIAZ SELLERS   • Hemorrhoids    • History of chemotherapy 2020    FOR ANAL CANCER, FOLLOWED BY DR. KASHMIR ARITA   • History of radiation therapy 2020    FOLLOWED BY DR. 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     Past Surgical History:  Past Surgical History:   Procedure Laterality Date   • COLONOSCOPY W/ POLYPECTOMY N/A 01/03/2020    LARGE MASS IN ANORECTAL AREA, BX: SQUAMOUS CELL CARCINOMA, 3 TUBULAR ADENOMA POLYPS IN ASCENDING, 3 TUBULAR ADENOMA POLYPS IN TRANSVERSE, MELANOSIS COLI, DR. IMTIAZ SELLERS AT [facility]    • ENDOSCOPY N/A 01/03/2020    DR. IMTIAZ SELLERS AT [facility]   • HYSTERECTOMY N/A 1993      Social History:   Social History     Tobacco Use   • Smoking status: Former Smoker     Packs/day: 0.50     Years: 40.00     Pack years: 20.00     Types: Cigarettes     Start date: 10/31/2004   • Smokeless tobacco: Never Used   Substance Use Topics   • Alcohol use: Not Currently     Frequency: Never      Family History:  Family History   Problem Relation Age of Onset   • Cancer Sister         Lung Cancer   • Lung cancer Sister 61   • Brain cancer Sister    • Hypertension Brother    • Heart disease Brother    • Cancer Sister         Brain Cancer   • Lung cancer Sister 72   • Heart disease Father    • Cancer Sister    • Lung cancer Sister    • Cancer Sister    • Lung cancer Sister        Home Meds:  Medications Prior to Admission   Medication Sig Dispense Refill Last Dose   • aspirin 81 MG EC tablet Take 81 mg by mouth Daily.   8/6/2020 at 1900   • Cranberry 250 MG tablet Take  by mouth.   8/6/2020 at 1900   • lisinopril (PRINIVIL,ZESTRIL) 10 MG tablet    8/6/2020 at 0900   • Multiple Vitamins-Minerals (MULTIVITAMIN ADULT) tablet Take  by mouth.   8/6/2020 at 0900   • Multiple Vitamins-Minerals (PRESERVISION AREDS 2 PO) Take  by mouth 2 (Two) Times a Day.   8/6/2020 at 1900   • ofloxacin (OCUFLOX) 0.3 %  ophthalmic solution    Past Month at Unknown time   • pantoprazole (PROTONIX) 40 MG EC tablet    8/6/2020 at 0900   • pravastatin (PRAVACHOL) 40 MG tablet 40 mg 2 (Two) Times a Day.   8/6/2020 at 1900   • vitamin C (ASCORBIC ACID) 500 MG tablet Take 500 mg by mouth Daily.   8/6/2020 at 0900   • vitamin E 400 UNIT capsule Take 400 Units by mouth Daily.   8/6/2020 at 1900     Current Meds:     aspirin 81 mg Oral Daily   pantoprazole 40 mg Oral Q AM   pravastatin 40 mg Oral Nightly   sodium chloride 10 mL Intravenous Q12H     Allergies:  Allergies   Allergen Reactions   • Codeine Rash   • Lipitor [Atorvastatin] Rash     Review of Systems  Pertinent items are noted in HPI, all other systems reviewed and negative     Objective     Vital Signs  Temp:  [97.8 °F (36.6 °C)-99.1 °F (37.3 °C)] 98.7 °F (37.1 °C)  Heart Rate:  [] 96  Resp:  [16-18] 16  BP: (126-176)/(71-92) 127/72  Physical Exam:  General Appearance:    Alert, cooperative, in no acute distress   Head:    Normocephalic, without obvious abnormality, atraumatic   Eyes:          conjunctivae and sclerae normal, no   icterus   Throat:   no thrush, oral mucosa moist   Neck:   Supple, no adenopathy   Lungs:     Clear to auscultation bilaterally    Heart:    Regular rhythm and normal rate    Chest Wall:    No abnormalities observed   Abdomen:     Soft, nondistended, nontender; normal bowel sounds   Extremities:   no edema, no redness   Skin:   No bruising or rash   Psychiatric:  normal mood and insight     Results Review:   I reviewed the patient's new clinical results.    Results from last 7 days   Lab Units 08/08/20  0658 08/07/20  1039   WBC 10*3/mm3 19.46* 17.07*   HEMOGLOBIN g/dL 9.5* 10.2*   HEMATOCRIT % 29.8* 31.3*   PLATELETS 10*3/mm3 589* 622*     Results from last 7 days   Lab Units 08/08/20  0658 08/07/20  1039   SODIUM mmol/L 136 137   POTASSIUM mmol/L 3.1* 3.8   CHLORIDE mmol/L 107 105   CO2 mmol/L 19.2* 18.9*   BUN mg/dL 31* 38*   CREATININE mg/dL  1.49* 1.65*   CALCIUM mg/dL 9.8 10.2   BILIRUBIN mg/dL 0.4 0.3   ALK PHOS U/L 187* 171*   ALT (SGPT) U/L 48* 44*   AST (SGOT) U/L 50* 37*   GLUCOSE mg/dL 121* 115*         No results found for: LIPASE    Radiology:  XR Chest 1 View   Final Result   Mild interstitial prominence could be chronic in nature   versus some mild interstitial edema. This is somewhat exaggerated by   suboptimal inspiration.   2. No focal infiltrates are seen.       This report was finalized on 8/7/2020 11:03 AM by Dr. Matthias Landeros M.D.          CT Abdomen Pelvis With Contrast    (Results Pending)       Assessment/Plan   Patient Active Problem List   Diagnosis   • Rectal mass   • Anal cancer (CMS/HCC)   • Fall   • Weakness   • LIAM (acute kidney injury) (CMS/HCC)   • Dehydration   • Metabolic acidosis   • Weight loss   • Hyperlipidemia   • Hypertension   • Leukocytosis       Assessment:  1. Weight loss: Per patient's account 25 pounds within the last 7 months.  2. Early satiety  3. GERD: Controlled with PPI  4. Anal cancer: Status post chemo and radiation therapy    Plan:  · Discussed options for evaluation with patient, including endoscopic assessment, radiographic evaluation, and calorie count to monitor intake.  She is inclined at this time not to go through endoscopy.  Advised once calorie count completed can discuss this further.      I discussed the patients findings and my recommendations with patient.    Ezequiel Acosta MD

## 2020-08-08 NOTE — PLAN OF CARE
Problem: Nutrition, Imbalanced: Inadequate Oral Intake (Adult)  Goal: Identify Related Risk Factors and Signs and Symptoms  Outcome: Ongoing (interventions implemented as appropriate)     Pt reports significant weight loss and decr appetite; s/p tx for anal cancer Jan-Mar 2020    Regular diet  Boost Plus TID  Calorie count ordered by GI x 3 days  RD following

## 2020-08-08 NOTE — THERAPY EVALUATION
Acute Care - Physical Therapy Initial Evaluation  [facility]     Patient Name: Benita Garcia  : 1940  MRN: [id number]  Today's Date: 2020                Admit Date: 2020    Visit Dx:     ICD-10-CM ICD-9-CM   1. Fall, initial encounter W19.XXXA E888.9   2. Acute renal insufficiency N28.9 593.9   3. Elevated liver function tests R79.89 790.6     Patient Active Problem List   Diagnosis   • Rectal mass   • Anal cancer (CMS/HCC)   • Fall   • Weakness   • LIAM (acute kidney injury) (CMS/HCC)   • Dehydration   • Metabolic acidosis   • Weight loss   • Hyperlipidemia   • Hypertension   • Leukocytosis     Past Medical History:   Diagnosis Date   • Anal cancer (CMS/HCC) 2020    SQUAMOUS CELL CARCINOMA, FOLLOWED BY DR. JOVITA BAUTISTA   • Aortic atherosclerosis (CMS/HCC)    • Arthritis    • Boil    • Chronic constipation    • Colon polyps     FOLLOWED BY DR. JOVITA BAUTISTA   • Depression    • Encounter for screening mammogram for breast cancer     2014, 10/2014, 2015, 2016, 2017, 2018, 2019.   • Exudative senile macular retinal degeneration (CMS/HCC)    • GERD (gastroesophageal reflux disease)     FOLLOWED BY DR. IMTIAZ SELLERS   • Hemorrhoids    • History of chemotherapy     FOR ANAL CANCER, FOLLOWED BY DR. KASHMIR ARITA   • History of radiation therapy     FOLLOWED BY DR. ANGELIQUE QUIROGA   • Hyperlipidemia    • Hypertension    • Melanosis coli 2020   • Osteoporosis    • Peripheral neuropathy    • Postmenopausal disorder    • Pulmonary HTN (CMS/HCC)    • Rectal bleeding    • Senile purpura (CMS/HCC)    • Transfusion history     Hx of blood transfussion.     Past Surgical History:   Procedure Laterality Date   • COLONOSCOPY W/ POLYPECTOMY N/A 2020    LARGE MASS IN ANORECTAL AREA, BX: SQUAMOUS CELL CARCINOMA, 3 TUBULAR ADENOMA POLYPS IN ASCENDING, 3 TUBULAR ADENOMA POLYPS IN TRANSVERSE, MELANOSIS COLI, DR. IMTIAZ SELLERS AT [facility]    • ENDOSCOPY N/A  01/03/2020    DR. IMTIAZ SELLERS AT [facility]   • HYSTERECTOMY N/A 1993        PT ASSESSMENT (last 12 hours)      Physical Therapy Evaluation    No documentation.       Physical Therapy Education                 Title: PT OT SLP Therapies (Done)     Topic: Physical Therapy (Done)     Point: Mobility training (Done)     Description:   Instruct learner(s) on safety and technique for assisting patient out of bed, chair or wheelchair.  Instruct in the proper use of assistive devices, such as walker, crutches, cane or brace.              Patient Friendly Description:   It's important to get you on your feet again, but we need to do so in a way that is safe for you. Falling has serious consequences, and your personal safety is the most important thing of all.        When it's time to get out of bed, one of us or a family member will sit next to you on the bed to give you support.     If your doctor or nurse tells you to use a walker, crutches, a cane, or a brace, be sure you use it every time you get out of bed, even if you think you don't need it.    Learning Progress Summary           Patient Acceptance, E,TB, VU,NR by CS at 8/8/2020 1132                   Point: Home exercise program (Done)     Description:   Instruct learner(s) on appropriate technique for monitoring, assisting and/or progressing patient with therapeutic exercises and activities.              Learning Progress Summary           Patient Acceptance, E,TB, VU,NR by CS at 8/8/2020 1132                   Point: Body mechanics (Done)     Description:   Instruct learner(s) on proper positioning and spine alignment for patient and/or caregiver during mobility tasks and/or exercises.              Learning Progress Summary           Patient Acceptance, E,TB, VU,NR by CS at 8/8/2020 1132                   Point: Precautions (Done)     Description:   Instruct learner(s) on prescribed precautions during mobility and gait tasks              Learning  Progress Summary           Patient Acceptance, E,TB, VU,NR by  at 8/8/2020 1132                               User Key     Initials Effective Dates Name Provider Type Discipline     05/14/18 -  Aston Sharif, PT Physical Therapist PT              PT Recommendation and Plan  Anticipated Discharge Disposition (PT): home with OP services  Planned Therapy Interventions (PT Eval): balance training, bed mobility training, gait training, transfer training, stair training  Therapy Frequency (PT Clinical Impression): daily  Outcome Summary/Treatment Plan (PT)  Anticipated Discharge Disposition (PT): home with OP services  Plan of Care Reviewed With: patient     Time Calculation:   PT Charges     Row Name 08/08/20 1139             Time Calculation    Start Time  1114  -      Stop Time  1132  -      Time Calculation (min)  18 min  -      PT Received On  08/08/20  -      PT - Next Appointment  08/09/20  -      PT Goal Re-Cert Due Date  08/15/20  -        User Key  (r) = Recorded By, (t) = Taken By, (c) = Cosigned By    Initials Name Provider Type     Aston Sharif, PT Physical Therapist        Therapy Charges for Today     Code Description Service Date Service Provider Modifiers Qty    00651794906 HC PT EVAL MOD COMPLEXITY 2 8/8/2020 Aston Sharif, PT GP 1    37725601194 HC PT THER PROC EA 15 MIN 8/8/2020 Aston Sharif, PT GP 1          PT G-Codes  Outcome Measure Options: AM-PAC 6 Clicks Basic Mobility (PT)  AM-PAC 6 Clicks Score (PT): 20      Aston Sharif PT  8/8/2020

## 2020-08-08 NOTE — CONSULTS
Responded to req for SC. Pt is in the room with visitor. Pt declined visit and wanted to talk with the visitor.

## 2020-08-08 NOTE — PLAN OF CARE
Problem: Patient Care Overview  Goal: Plan of Care Review  Outcome: Ongoing (interventions implemented as appropriate)  Flowsheets (Taken 8/8/2020 5613)  Progress: improving  Plan of Care Reviewed With: patient  Outcome Summary: Pt is 79 y/o female admitted with LIAM after sustaining a fall at home. IV fluids started and labwork improved. GI, oncology, and nutrition consulted. Calorie count started. Awaiting CT of abdomen. Will continue to monitor.

## 2020-08-09 VITALS
RESPIRATION RATE: 16 BRPM | HEART RATE: 82 BPM | HEIGHT: 61 IN | TEMPERATURE: 97.8 F | BODY MASS INDEX: 25.05 KG/M2 | OXYGEN SATURATION: 96 % | SYSTOLIC BLOOD PRESSURE: 108 MMHG | WEIGHT: 132.7 LBS | DIASTOLIC BLOOD PRESSURE: 62 MMHG

## 2020-08-09 LAB
ALBUMIN SERPL-MCNC: 2.6 G/DL (ref 3.5–5.2)
ANION GAP SERPL CALCULATED.3IONS-SCNC: 10 MMOL/L (ref 5–15)
BASOPHILS # BLD AUTO: 0.11 10*3/MM3 (ref 0–0.2)
BASOPHILS NFR BLD AUTO: 0.6 % (ref 0–1.5)
BUN SERPL-MCNC: 26 MG/DL (ref 8–23)
BUN/CREAT SERPL: 19.5 (ref 7–25)
CALCIUM SPEC-SCNC: 9.4 MG/DL (ref 8.6–10.5)
CHLORIDE SERPL-SCNC: 107 MMOL/L (ref 98–107)
CO2 SERPL-SCNC: 20 MMOL/L (ref 22–29)
CREAT SERPL-MCNC: 1.33 MG/DL (ref 0.57–1)
DEPRECATED RDW RBC AUTO: 42.7 FL (ref 37–54)
EOSINOPHIL # BLD AUTO: 0.11 10*3/MM3 (ref 0–0.4)
EOSINOPHIL NFR BLD AUTO: 0.6 % (ref 0.3–6.2)
ERYTHROCYTE [DISTWIDTH] IN BLOOD BY AUTOMATED COUNT: 12.9 % (ref 12.3–15.4)
FOLATE SERPL-MCNC: 18.2 NG/ML (ref 4.78–24.2)
GFR SERPL CREATININE-BSD FRML MDRD: 39 ML/MIN/1.73
GLUCOSE SERPL-MCNC: 107 MG/DL (ref 65–99)
HCT VFR BLD AUTO: 25.8 % (ref 34–46.6)
HGB BLD-MCNC: 8.6 G/DL (ref 12–15.9)
IMM GRANULOCYTES # BLD AUTO: 0.19 10*3/MM3 (ref 0–0.05)
IMM GRANULOCYTES NFR BLD AUTO: 1 % (ref 0–0.5)
LYMPHOCYTES # BLD AUTO: 1.52 10*3/MM3 (ref 0.7–3.1)
LYMPHOCYTES NFR BLD AUTO: 8 % (ref 19.6–45.3)
MAGNESIUM SERPL-MCNC: 1.8 MG/DL (ref 1.6–2.4)
MCH RBC QN AUTO: 30.6 PG (ref 26.6–33)
MCHC RBC AUTO-ENTMCNC: 33.3 G/DL (ref 31.5–35.7)
MCV RBC AUTO: 91.8 FL (ref 79–97)
MONOCYTES # BLD AUTO: 1.09 10*3/MM3 (ref 0.1–0.9)
MONOCYTES NFR BLD AUTO: 5.7 % (ref 5–12)
NEUTROPHILS NFR BLD AUTO: 16.08 10*3/MM3 (ref 1.7–7)
NEUTROPHILS NFR BLD AUTO: 84.1 % (ref 42.7–76)
NRBC BLD AUTO-RTO: 0 /100 WBC (ref 0–0.2)
PHOSPHATE SERPL-MCNC: 3.5 MG/DL (ref 2.5–4.5)
PLATELET # BLD AUTO: 599 10*3/MM3 (ref 140–450)
PMV BLD AUTO: 8.9 FL (ref 6–12)
POTASSIUM SERPL-SCNC: 3.4 MMOL/L (ref 3.5–5.2)
RBC # BLD AUTO: 2.81 10*6/MM3 (ref 3.77–5.28)
SODIUM SERPL-SCNC: 137 MMOL/L (ref 136–145)
WBC # BLD AUTO: 19.1 10*3/MM3 (ref 3.4–10.8)

## 2020-08-09 PROCEDURE — 88184 FLOWCYTOMETRY/ TC 1 MARKER: CPT | Performed by: INTERNAL MEDICINE

## 2020-08-09 PROCEDURE — 88185 FLOWCYTOMETRY/TC ADD-ON: CPT | Performed by: INTERNAL MEDICINE

## 2020-08-09 PROCEDURE — 99225 PR SBSQ OBSERVATION CARE/DAY 25 MINUTES: CPT | Performed by: INTERNAL MEDICINE

## 2020-08-09 PROCEDURE — 99214 OFFICE O/P EST MOD 30 MIN: CPT | Performed by: INTERNAL MEDICINE

## 2020-08-09 PROCEDURE — 82746 ASSAY OF FOLIC ACID SERUM: CPT | Performed by: INTERNAL MEDICINE

## 2020-08-09 PROCEDURE — 80069 RENAL FUNCTION PANEL: CPT | Performed by: INTERNAL MEDICINE

## 2020-08-09 PROCEDURE — 88182 CELL MARKER STUDY: CPT

## 2020-08-09 PROCEDURE — 88377 M/PHMTRC ALYS ISHQUANT/SEMIQ: CPT

## 2020-08-09 PROCEDURE — G0378 HOSPITAL OBSERVATION PER HR: HCPCS

## 2020-08-09 PROCEDURE — 81270 JAK2 GENE: CPT

## 2020-08-09 PROCEDURE — 88185 FLOWCYTOMETRY/TC ADD-ON: CPT

## 2020-08-09 PROCEDURE — 83735 ASSAY OF MAGNESIUM: CPT | Performed by: INTERNAL MEDICINE

## 2020-08-09 PROCEDURE — 85025 COMPLETE CBC W/AUTO DIFF WBC: CPT | Performed by: INTERNAL MEDICINE

## 2020-08-09 RX ADMIN — SODIUM CHLORIDE, PRESERVATIVE FREE 10 ML: 5 INJECTION INTRAVENOUS at 09:37

## 2020-08-09 RX ADMIN — PANTOPRAZOLE SODIUM 40 MG: 40 TABLET, DELAYED RELEASE ORAL at 06:04

## 2020-08-09 RX ADMIN — ASPIRIN 81 MG: 81 TABLET, COATED ORAL at 09:36

## 2020-08-09 NOTE — PROGRESS NOTES
[facility]  Inpatient Progress Note    Reason for Follow Up:  Early satiety, weight loss    Subjective     Interval History:   Says that she ate breakfast this morning without issue, ate dinner without issue.  Denies abdominal pain or early satiety.  Says that her issue with not wanting to eat is more poor appetite, poor sense of taste and smell    Current Facility-Administered Medications:   •  acetaminophen (TYLENOL) tablet 650 mg, 650 mg, Oral, Q4H PRN, Brenda Garrison APRN  •  aspirin EC tablet 81 mg, 81 mg, Oral, Daily, Brenda Garrison APRN, 81 mg at 08/08/20 0845  •  enoxaparin (LOVENOX) syringe 30 mg, 30 mg, Subcutaneous, Q24H, Adair Lara, , 30 mg at 08/08/20 1630  •  ondansetron (ZOFRAN) tablet 4 mg, 4 mg, Oral, Q6H PRN **OR** ondansetron (ZOFRAN) injection 4 mg, 4 mg, Intravenous, Q6H PRN, Brenda Garrison APRN  •  pantoprazole (PROTONIX) EC tablet 40 mg, 40 mg, Oral, Q AM, Brenda Garrison APRN, 40 mg at 08/09/20 0604  •  pravastatin (PRAVACHOL) tablet 40 mg, 40 mg, Oral, Nightly, Brenda Garrison APRN, 40 mg at 08/08/20 2127  •  [COMPLETED] Insert peripheral IV, , , Once **AND** sodium chloride 0.9 % flush 10 mL, 10 mL, Intravenous, PRN, Brenda Garrison, APRN  •  sodium chloride 0.9 % flush 10 mL, 10 mL, Intravenous, Q12H, Brenda Garrison APRN, 10 mL at 08/08/20 0845  •  sodium chloride 0.9 % flush 10 mL, 10 mL, Intravenous, PRN, Brenda Garrison, APRN  Review of Systems:   All systems reviewed and negative except for: Constitution: Anorexia, poor taste    Objective     Vital Signs  Temp:  [97.6 °F (36.4 °C)-100.5 °F (38.1 °C)] 97.8 °F (36.6 °C)  Heart Rate:  [] 89  Resp:  [16] 16  BP: (110-137)/(59-78) 110/59  Body mass index is 25.07 kg/m².    Intake/Output Summary (Last 24 hours) at 8/9/2020 0833  Last data filed at 8/9/2020 0600  Gross per 24 hour   Intake 120 ml   Output 200 ml   Net -80 ml     No intake/output data recorded.     Physical Exam:   General:  patient awake, alert and cooperative, thin   Eyes: Normal lids and lashes, no scleral icterus   Neck: supple, normal ROM   Skin: warm and dry, not jaundiced   Cardiovascular: regular rhythm and rate, no murmurs auscultated   Pulm: clear to auscultation bilaterally, regular and unlabored   Abdomen: soft, nontender, nondistended; normal bowel sounds   Rectal: deferred   Extremities: no rash or edema   Psychiatric: Normal mood and behavior; memory intact     Results Review:     I reviewed the patient's new clinical results.    Results from last 7 days   Lab Units 08/09/20 0328 08/08/20  0658 08/07/20  1039   WBC 10*3/mm3 19.10* 19.46* 17.07*   HEMOGLOBIN g/dL 8.6* 9.5* 10.2*   HEMATOCRIT % 25.8* 29.8* 31.3*   PLATELETS 10*3/mm3 599* 589* 622*     Results from last 7 days   Lab Units 08/09/20 0328 08/08/20 0658 08/07/20  1039   SODIUM mmol/L 137 136 137   POTASSIUM mmol/L 3.4* 3.1* 3.8   CHLORIDE mmol/L 107 107 105   CO2 mmol/L 20.0* 19.2* 18.9*   BUN mg/dL 26* 31* 38*   CREATININE mg/dL 1.33* 1.49* 1.65*   CALCIUM mg/dL 9.4 9.8 10.2   BILIRUBIN mg/dL  --  0.4 0.3   ALK PHOS U/L  --  187* 171*   ALT (SGPT) U/L  --  48* 44*   AST (SGOT) U/L  --  50* 37*   GLUCOSE mg/dL 107* 121* 115*         No results found for: LIPASE    Radiology:  CT Abdomen Pelvis With Contrast   Final Result   1.  Continued evolution of ill-defined soft tissue thickening and   stranding within the presacral soft tissues, likely related to ongoing   treatment for patient's primary malignancy. There is also thickening of   the pelvic floor appears to be asymmetric with more prominent on the   left; however this area is not well evaluated on noncontrast CT while   findings may be posttreatment related, residual neoplasm cannot be   excluded and continued attention on follow-up versus further evaluation   with PET/CT can be performed if clinically indicated.   2.  Small fat-containing right inguinal hernia without findings of   obstruction, as  before.   3.  Asymmetric enlargement of the left adnexa, grossly unchanged since   01/30/2020 which has not demonstrated significant FDG uptake on PET CTs.   4.  Other findings as above.       This report was finalized on 8/8/2020 8:18 PM by Dr. Carlos Mackey M.D.          XR Chest 1 View   Final Result   Mild interstitial prominence could be chronic in nature   versus some mild interstitial edema. This is somewhat exaggerated by   suboptimal inspiration.   2. No focal infiltrates are seen.       This report was finalized on 8/7/2020 11:03 AM by Dr. Matthias Landeros M.D.              Assessment/Plan     Patient Active Problem List   Diagnosis   • Rectal mass   • Anal cancer (CMS/HCC)   • Fall   • Weakness   • LIAM (acute kidney injury) (CMS/HCC)   • Dehydration   • Metabolic acidosis   • Weight loss   • Hyperlipidemia   • Hypertension   • Leukocytosis       Impression  1. Unintentional weight loss: Secondary to poor appetite    2.  Poor appetite: She says that there is no component of abdominal pain, nausea nor early satiety.    3. GERD: Able on PPI    4. Anal cancer: diagnosed earlier this year, s/p chemo and radiation    5. Anemia, thrombocytosis, leukocytosis: Rheumatology is on board    6. Elevated transaminases: mild, normal appearing liver on imaging    All issues are new to me today    Plan  We discussed that her bigger issue is poor appetite and lack of desire to eat.  She is able to eat without difficulty, there is no pain, no nausea no fullness.  I discussed with her that she is simply going to have to make herself eat and to eat frequent small meals that are high in calories throughout the day.  She verbalizes understanding of this and is in agreement    She does have a scale at home and advised that she weigh herself weekly    I discussed the patients findings and my recommendations with patient.    Over 25 minutes was spent reviewing the patient's chart and records, in discussion with the patient, in  examination of the patient, and in discussion with members of the patient's medical team.    Karin Allen MD

## 2020-08-09 NOTE — PROGRESS NOTES
[facility] INPATIENT PROGRESS NOTE    Length of Stay:  0 days    CHIEF COMPLAINT/REASON FOR VISIT:  History of anal/rectal squamous cell carcinoma  Decreased appetite, weight loss, fall  Provide an opinion regarding further evaluation and treatment    SUBJECTIVE:  Tmax 100.5.  Currently afebrile.  Ate well yesterday and this morning.  Leg pain significantly improved.  Wants to go home.     ROS:  Positive for - decreased appetite, improved.  Weakness, improved  Negative for - dyspnea, chills    OBJECTIVE:  Vitals:    08/08/20 2251 08/09/20 0254 08/09/20 0600 08/09/20 0700   BP: 116/62 114/67  110/59   BP Location: Left arm Left arm  Right arm   Patient Position: Lying Lying  Lying   Pulse: 105 94 88 89   Resp: 16 16  16   Temp: 100 °F (37.8 °C) 98.7 °F (37.1 °C)  97.8 °F (36.6 °C)   TempSrc: Skin Skin  Skin   SpO2: 93% 92%  94%   Weight:       Height:             PHYSICAL EXAMINATION:  General:  NAD, sitting chair, alert/oriented  Chest/Lungs:  CTAB  Heart: RRR  Abdomen/GI: soft, NT, ND, NABS  Extremities:WWP.  No edema    DIAGNOSTIC DATA:  Results Review:     I reviewed the patient's new clinical results.    Results from last 7 days   Lab Units 08/09/20  0328   WBC 10*3/mm3 19.10*   HEMOGLOBIN g/dL 8.6*   HEMATOCRIT % 25.8*   PLATELETS 10*3/mm3 599*     Lab Results   Component Value Date    NEUTROABS 16.08 (H) 08/09/2020     Results from last 7 days   Lab Units 08/09/20  0328   SODIUM mmol/L 137   POTASSIUM mmol/L 3.4*   CHLORIDE mmol/L 107   CO2 mmol/L 20.0*   BUN mg/dL 26*   CREATININE mg/dL 1.33*   GLUCOSE mg/dL 107*   CALCIUM mg/dL 9.4         Results from last 7 days   Lab Units 08/09/20  0328   MAGNESIUM mg/dL 1.8       IMAGING:    CT imaging chest abdomen pelvis 8/8/2020.    Images personally reviewed.    Ill-defined soft tissue thickening and stranding in the presacral soft tissues with continued evolution.  Thickening of the pelvic floor.  Residual neoplasm cannot be excluded.  Small  fat-containing right inguinal hernia.  Asymmetric enlargement of the left adnexa, unchanged since 1/30/2020.    Assessment/Plan   ASSESSMENT/PLAN:  This is a 80 y.o. female with:     *History of squamous cell anal cancer.   · Initiation of concurrent chemotherapy and radiation with Xeloda and mitomycin on 2/12/2020 with mitomycin administered on 2/17/2020.  This was her only dose of mitomycin.    · Completed therapy on 3/27/2020.  · She followed up with Dr. Stack 4/29/2020 with improvement in her symptoms and improvement in the size of the tumor on physical examination.  · Follow-up with Dr. Stack on 6/15/2020 with no residual tumor visible on examination.  · Follow-up PET scan 6/16/2020 with a significant decrease in FDG uptake in the distal rectum and anus with no evidence of metastatic disease in the chest abdomen or pelvis.  Intense mediastinal bilateral hilar lymphadenopathy unchanged from prior imaging thought to be reactive.    · She saw Dr. Gray on 6/18/2020.  She was doing well at that time aside from some ongoing perianal pain treated with Aquaphor.  Follow-up CT imaging in 6 months planned.     · CT abdomen and pelvis 8/8/2020: Ill-defined soft tissue thickening and stranding in the presacral soft tissues with continued evolution.  Thickening of the pelvic floor.  Residual neoplasm cannot be excluded.  Small fat-containing right inguinal hernia.  Asymmetric enlargement of the left adnexa, unchanged since 1/30/2020.     *Normocytic anemia  · Admission hemoglobin 10.2, MCV 91.8  · Baseline hemoglobin normal between 12 and 13 February 2020  · Gradually worsening anemia likely secondary to chemotherapy  · Hemoglobin had improved at 11.0 as of 6/18/2020  · Ferritin 274, iron 22, TIBC 200, 11% iron saturation, folic acid 18.2, vitamin B12 467  · Hemoglobin dropped to 8.6 as of 8/9/2020     *Thrombocytosis  · Baseline platelet count has been normal  · Admission platelets 6 8/7/2020 622,000  · Could be  reactive thrombocytosis.  Myeloproliferative disorder possible.  · Persistently elevated 599,000 on 8/9/2020     *Leukocytosis with neutrophilia  · Baseline white blood cell count around 11,000  · White blood cell count normalized during chemotherapy  · White blood cell count 13.86 on 6/18/2020 with 74% neutrophils and 15% lymphocytes  · Admission white blood cell count on 8/7/2020 was 17.07 with 85% neutrophils and 7% lymphocytes  · Potentially reactive but possibility of a myeloproliferative disorder  · Persistently elevated at 19.1 on 8/9/2020     *Weakness, anorexia, weight loss, early satiety  · Gastroenterology has evaluated.  She prefers not to have an EGD at this time.  · Symptoms improved      *Admitted status post fall secondary to weakness: No acute injuries    *History of mediastinal and hilar adenopathy on prior imaging.  PET scan 1/30/2020 with moderate to intense mediastinal and bilateral hilar adenopathy.  Appearance unchanged as of 6/16/2020 PET scan.  Favored to be reactive.  · It is difficult to imagine that anything changed with these over the past couple of months that would be contributing to her current symptoms since there was stability from January to June.     RECOMMENDATIONS/PLAN:   *Pending labs include peripheral blood flow cytometry, Bertin 2 and FISH for BCR-ABL.  *She is being discharged today.  I'll make sure she has follow up in the office scheduled in the next couple of weeks.             Omi Laguerre MD

## 2020-08-09 NOTE — PROGRESS NOTES
Discharge Planning Assessment  [facility]     Patient Name: Benita Garcia  MRN: [id number]  Today's Date: 8/9/2020    Admit Date: 8/7/2020    Discharge Needs Assessment     Row Name 08/09/20 0814       Living Environment    Lives With  spouse    Current Living Arrangements  home/apartment/condo    Primary Care Provided by  self    Provides Primary Care For  no one    Family Caregiver if Needed  spouse    Quality of Family Relationships  helpful;involved;supportive    Able to Return to Prior Arrangements  yes       Resource/Environmental Concerns    Resource/Environmental Concerns  none    Transportation Concerns  car, none       Transition Planning    Patient/Family Anticipates Transition to  home with family    Patient/Family Anticipated Services at Transition  none    Transportation Anticipated  family or friend will provide       Discharge Needs Assessment    Concerns to be Addressed  discharge planning    Equipment Currently Used at Home  none Pt. has walker and cane at home, did not require use prior to admit.     Anticipated Changes Related to Illness  none        Discharge Plan     Row Name 08/09/20 0816       Plan    Plan  Pt. plans to return home with spouse at DC. Denies needs, aware CCP available and can assist should needs arise/wishes change...........Florencio GAINES     Patient/Family in Agreement with Plan  yes    Plan Comments  Spoke with patient, introduced self and role. Pt. lives in a ss house with spouse, no steps to enter and one step from sun porch to kitchen inside. Pt. denies using assistive devices prior to admit, states she has a walker and cane at home if she need them at DC. Pt. denies hx with HH or [facility]. Plans to return home with spouse and states she does not believe that she will need Home Health at DC but if she changes her mind will let RN and/or CCP know. Aware CCP available should needs arise.........Florencio GAINES           Functional Status     Row Name 08/09/20 0814        Functional Status    Usual Activity Tolerance  moderate    Current Activity Tolerance  moderate       Functional Status, IADL    Medications  independent;assistive person    Meal Preparation  independent;assistive person    Housekeeping  independent;assistive person    Laundry  independent;assistive person    Shopping  independent;assistive person          Carole Luna RN

## 2020-08-09 NOTE — DISCHARGE SUMMARY
[facility] HOSPITALIST   DISCHARGE SUMMARY      Name:  Benita Garcia   Age:  80 y.o.  Sex:  female  :  1940  MRN:  [id number]   Visit Number:  [id number]  Primary Care Physician:  Rosa Christianson MD  Date of Discharge:  2020  Admission Date:  2020      Discharge Diagnosis:     1.  Unintentional weight loss.  2.  Early satiety.  3.  Recent rectal cancer status post chemoradiation completed 2020.  4.  Essential hypertension, improved  5.  History of pulmonary hypertension.  6.  Hyperlipidemia.  7.  Leukocytosis with out any obvious sign of infection.  COVID-19 was negative.  8.  Thrombocytosis  9.  Anemia  10.  Severe malnutrition  11.    Mildly elevated LFTs.    Active Hospital Problems    Diagnosis  POA   • Fall [W19.XXXA]  Yes   • Weakness [R53.1]  Unknown   • LIAM (acute kidney injury) (CMS/HCC) [N17.9]  Unknown   • Dehydration [E86.0]  Unknown   • Metabolic acidosis [E87.2]  Unknown   • Weight loss [R63.4]  Unknown   • Hyperlipidemia [E78.5]  Unknown   • Hypertension [I10]  Unknown   • Leukocytosis [D72.829]  Unknown   • Anal cancer (CMS/HCC) [C21.0]  Yes      Resolved Hospital Problems   No resolved problems to display.         Presenting Problem/History of Present Illness:    Acute renal insufficiency [N28.9]  Elevated liver function tests [R79.89]  Fall, initial encounter [W19.XXXA]         Hospital Course:    Patient has had 25 pound weight loss since completing her chemoradiation for anal cancer in 2020.  She had a PET scan in 2020 which showed improvement.  She claims that she does not want to eat, and she also claims that she has early satiety.  She cannot taste food, but she does try to eat.  But she takes 1 or 2 bites and she feels full.  She denies any food getting stuck midesophagus.  She does have a history of reflux.  She takes Protonix for this.  She had a EGD in the past but is many years ago.  Her initial creatinine was 1.65, and is  now 1.4 now with IV fluids.  Iron studies were ordered which suggest chronic disease.  CRP is elevated as is WBC.  Patient has thrombocytosis as well.     Lactate was normal.  Sed rate is greater than 140 however.  Oncology was consulted, given her history of anal cancer and her blood abnormalities.  Oncology is ruling out myeloma proliferative disorder, with peripheral blood flow cytometry,.Bertin 2 and FISH for BCR-ABL.  Urinalysis was negative for infection, chest x-ray was negative as well.  Abdominal CT was done which did not show evidence of infection, and was essentially unchanged from previous CT.  There however was increased ill-defined soft tissue thickening within the presacral soft tissues.  Recommended follow-up PET/CT as an outpatient by radiology, as this was felt to be related to the patient's primary malignancy.     Gastroenterology was consulted, they recommended EGD, however the patient refused  this.  Advised her to get this given her weight loss and her early satiety.  Nutritionist seen the patient and has deemed her to have severe malnutrition.    Patient was adamant that she wanted to go home.  Advised her of the elevated WBC count which is being worked up by oncology at present.  They felt this was reactive versus possibility of myeloproliferative disorder.  She states that she could follow-up with  from oncology, advised she would need to follow-up next week.  Also advised her if she was to develop fever, or worsening abdominal pain or shortness of breath she is to return to the emergency room.  Advised her that work-up was in progress, however no sign of infection has been found at the present time so this would not warrant antibiotics.    Patient is totally asymptomatic at present.  She states she is eating better.  She is ambulating in the room.  She denies any chest pressure, nausea, vomiting, shortness of breath, cough.  COVID-19 testing was negative.  Patient is afebrile at  present.    Patient should also follow-up with her PCP this week.    Procedures Performed:           Consults:     Consults     Date and Time Order Name Status Description    8/7/2020 1818 Inpatient Gastroenterology Consult Completed     8/7/2020 1749 Hematology & Oncology Inpatient Consult Completed     8/7/2020 1224 [facility] (on-call MD unless specified) Details Completed           Pertinent Test Results:     Lab Results (all)     Procedure Component Value Units Date/Time    Blood Culture - Blood, Arm, Right [[id number]] Collected:  08/08/20 1022    Specimen:  Blood from Arm, Right Updated:  08/09/20 1030     Blood Culture No growth at 24 hours    BCR-ABL FISH [[id number]] Collected:  08/09/20 0328    Specimen:  Blood Updated:  08/09/20 0633    JAK2 Mutation Analysis, Qual [[id number]] Collected:  08/09/20 0328    Specimen:  Blood Updated:  08/09/20 0632    Folate [[id number]]  (Normal) Collected:  08/09/20 0328    Specimen:  Blood Updated:  08/09/20 0458     Folate 18.20 ng/mL     Narrative:       Results may be falsely increased if patient taking Biotin.      Renal Function Panel [[id number]]  (Abnormal) Collected:  08/09/20 0328    Specimen:  Blood Updated:  08/09/20 0441     Glucose 107 mg/dL      BUN 26 mg/dL      Creatinine 1.33 mg/dL      Sodium 137 mmol/L      Potassium 3.4 mmol/L      Chloride 107 mmol/L      CO2 20.0 mmol/L      Calcium 9.4 mg/dL      Albumin 2.60 g/dL      Phosphorus 3.5 mg/dL      Anion Gap 10.0 mmol/L      BUN/Creatinine Ratio 19.5     eGFR Non African Amer 39 mL/min/1.73     Narrative:       GFR Normal >60  Chronic Kidney Disease <60  Kidney Failure <15      Magnesium [[id number]]  (Normal) Collected:  08/09/20 0328    Specimen:  Blood Updated:  08/09/20 0441     Magnesium 1.8 mg/dL     CBC & Differential [[id number]] Collected:  08/09/20 0328    Specimen:  Blood Updated:  08/09/20 0420    Narrative:       The following orders were created for panel order CBC & Differential.  Procedure                                Abnormality         Status                     ---------                               -----------         ------                     CBC Auto Differential[[id number]]        Abnormal            Final result                 Please view results for these tests on the individual orders.    CBC Auto Differential [[id number]]  (Abnormal) Collected:  08/09/20 0328    Specimen:  Blood Updated:  08/09/20 0420     WBC 19.10 10*3/mm3      RBC 2.81 10*6/mm3      Hemoglobin 8.6 g/dL      Hematocrit 25.8 %      MCV 91.8 fL      MCH 30.6 pg      MCHC 33.3 g/dL      RDW 12.9 %      RDW-SD 42.7 fl      MPV 8.9 fL      Platelets 599 10*3/mm3      Neutrophil % 84.1 %      Lymphocyte % 8.0 %      Monocyte % 5.7 %      Eosinophil % 0.6 %      Basophil % 0.6 %      Immature Grans % 1.0 %      Neutrophils, Absolute 16.08 10*3/mm3      Lymphocytes, Absolute 1.52 10*3/mm3      Monocytes, Absolute 1.09 10*3/mm3      Eosinophils, Absolute 0.11 10*3/mm3      Basophils, Absolute 0.11 10*3/mm3      Immature Grans, Absolute 0.19 10*3/mm3      nRBC 0.0 /100 WBC     Flow Cytometry, Blood [[id number]] Collected:  08/09/20 0328    Specimen:  Blood Updated:  08/09/20 0409    Blood Culture - Blood, Arm, Left [[id number]] Collected:  08/07/20 1741    Specimen:  Blood from Arm, Left Updated:  08/08/20 1800     Blood Culture No growth at 24 hours    Blood Culture - Blood, Arm, Right [[id number]] Collected:  08/07/20 1741    Specimen:  Blood from Arm, Right Updated:  08/08/20 1800     Blood Culture No growth at 24 hours    Retic With IRF & RET-He [[id number]]  (Abnormal) Collected:  08/08/20 1635    Specimen:  Blood Updated:  08/08/20 1700     Immature Reticulocyte Fraction 27.8 %      Reticulocyte % 1.88 %      Reticulocyte Hgb 30.5 pg     Vitamin B12 [[id number]]  (Normal) Collected:  08/07/20 1039    Specimen:  Blood Updated:  08/08/20 1328     Vitamin B-12 467 pg/mL     Narrative:       Results may be falsely increased if patient taking  Biotin.      Ferritin [[id number]]  (Abnormal) Collected:  08/08/20 0658    Specimen:  Blood Updated:  08/08/20 1319     Ferritin 274.00 ng/mL     Narrative:       Results may be falsely decreased if patient taking Biotin.      Iron Profile [[id number]]  (Abnormal) Collected:  08/08/20 0658    Specimen:  Blood Updated:  08/08/20 1316     Iron 22 mcg/dL      Iron Saturation 11 %      Transferrin 134 mg/dL      TIBC 200 mcg/dL     Sedimentation Rate [[id number]]  (Abnormal) Collected:  08/08/20 0658    Specimen:  Blood Updated:  08/08/20 1053     Sed Rate >140 mm/hr     Lactic Acid, Plasma [[id number]]  (Normal) Collected:  08/08/20 1022    Specimen:  Blood from Arm, Right Updated:  08/08/20 1045     Lactate 1.5 mmol/L     C-reactive Protein [[id number]]  (Abnormal) Collected:  08/08/20 0658    Specimen:  Blood Updated:  08/08/20 1043     C-Reactive Protein 10.00 mg/dL     Urine Culture - Urine, Urine, Clean Catch [[id number]]  (Normal) Collected:  08/07/20 1141    Specimen:  Urine, Clean Catch Updated:  08/08/20 1043     Urine Culture No growth    Comprehensive Metabolic Panel [[id number]]  (Abnormal) Collected:  08/08/20 0658    Specimen:  Blood Updated:  08/08/20 0755     Glucose 121 mg/dL      BUN 31 mg/dL      Creatinine 1.49 mg/dL      Sodium 136 mmol/L      Potassium 3.1 mmol/L      Chloride 107 mmol/L      CO2 19.2 mmol/L      Calcium 9.8 mg/dL      Total Protein 6.6 g/dL      Albumin 2.70 g/dL      ALT (SGPT) 48 U/L      AST (SGOT) 50 U/L      Alkaline Phosphatase 187 U/L      Total Bilirubin 0.4 mg/dL      eGFR Non African Amer 34 mL/min/1.73      Globulin 3.9 gm/dL      A/G Ratio 0.7 g/dL      BUN/Creatinine Ratio 20.8     Anion Gap 9.8 mmol/L     Narrative:       GFR Normal >60  Chronic Kidney Disease <60  Kidney Failure <15      Lipid Panel [[id number]]  (Abnormal) Collected:  08/08/20 0658    Specimen:  Blood Updated:  08/08/20 0755     Total Cholesterol 123 mg/dL      Triglycerides 222 mg/dL      HDL  Cholesterol 26 mg/dL      LDL Cholesterol  53 mg/dL      VLDL Cholesterol 44.4 mg/dL      LDL/HDL Ratio 2.02    Narrative:       Cholesterol Reference Ranges  (U.S. Department of Health and Human Services ATP III Classifications)    Desirable          <200 mg/dL  Borderline High    200-239 mg/dL  High Risk          >240 mg/dL      Triglyceride Reference Ranges  (U.S. Department of Health and Human Services ATP III Classifications)    Normal           <150 mg/dL  Borderline High  150-199 mg/dL  High             200-499 mg/dL  Very High        >500 mg/dL    HDL Reference Ranges  (U.S. Department of Health and Human Services ATP III Classifcations)    Low     <40 mg/dl (major risk factor for CHD)  High    >60 mg/dl ('negative' risk factor for CHD)        LDL Reference Ranges  (U.S. Department of Health and Human Services ATP III Classifcations)    Optimal          <100 mg/dL  Near Optimal     100-129 mg/dL  Borderline High  130-159 mg/dL  High             160-189 mg/dL  Very High        >189 mg/dL    CBC Auto Differential [[id number]]  (Abnormal) Collected:  08/08/20 0658    Specimen:  Blood Updated:  08/08/20 0739     WBC 19.46 10*3/mm3      RBC 3.14 10*6/mm3      Hemoglobin 9.5 g/dL      Hematocrit 29.8 %      MCV 94.9 fL      MCH 30.3 pg      MCHC 31.9 g/dL      RDW 12.7 %      RDW-SD 44.4 fl      MPV 8.9 fL      Platelets 589 10*3/mm3      Neutrophil % 83.6 %      Lymphocyte % 8.9 %      Monocyte % 5.4 %      Eosinophil % 0.3 %      Basophil % 0.6 %      Immature Grans % 1.2 %      Neutrophils, Absolute 16.26 10*3/mm3      Lymphocytes, Absolute 1.73 10*3/mm3      Monocytes, Absolute 1.06 10*3/mm3      Eosinophils, Absolute 0.06 10*3/mm3      Basophils, Absolute 0.11 10*3/mm3      Immature Grans, Absolute 0.24 10*3/mm3      nRBC 0.0 /100 WBC     POC Glucose Once [[id number]]  (Normal) Collected:  08/07/20 2212    Specimen:  Blood Updated:  08/07/20 2214     Glucose 117 mg/dL     Procalcitonin [[id number]]  (Normal)  "Collected:  08/07/20 1039    Specimen:  Blood Updated:  08/07/20 1828     Procalcitonin 0.22 ng/mL     Narrative:       As a Marker for Sepsis (Non-Neonates):   1. <0.5 ng/mL represents a low risk of severe sepsis and/or septic shock.  1. >2 ng/mL represents a high risk of severe sepsis and/or septic shock.    As a Marker for Lower Respiratory Tract Infections that require antibiotic therapy:  PCT on Admission     Antibiotic Therapy             6-12 Hrs later  > 0.5                Strongly Recommended            >0.25 - <0.5         Recommended  0.1 - 0.25           Discouraged                   Remeasure/reassess PCT  <0.1                 Strongly Discouraged          Remeasure/reassess PCT      As 28 day mortality risk marker: \"Change in Procalcitonin Result\" (> 80 % or <=80 %) if Day 0 (or Day 1) and Day 4 values are available. Refer to http://www.Optynpct-calculator.[employer]/   Change in PCT <=80 %   A decrease of PCT levels below or equal to 80 % defines a positive change in PCT test result representing a higher risk for 28-day all-cause mortality of patients diagnosed with severe sepsis or septic shock.  Change in PCT > 80 %   A decrease of PCT levels of more than 80 % defines a negative change in PCT result representing a lower risk for 28-day all-cause mortality of patients diagnosed with severe sepsis or septic shock.                Results may be falsely decreased if patient taking Biotin.     COVID PRE-OP / PRE-PROCEDURE SCREENING ORDER (NO ISOLATION) - Swab, Nasopharynx [[id number]] Collected:  08/07/20 1402    Specimen:  Swab from Nasopharynx Updated:  08/07/20 7170    Narrative:       The following orders were created for panel order COVID PRE-OP / PRE-PROCEDURE SCREENING ORDER (NO ISOLATION) - Swab, Nasopharynx.  Procedure                               Abnormality         Status                     ---------                               -----------         ------                     Respiratory Panel PCR " w/...[[id number]]  Normal              Final result                 Please view results for these tests on the individual orders.    Respiratory Panel PCR w/COVID-19(SARS-CoV-2) SHAHRZAD/ALEX/PATRICIA In-House, NP Swab in UTM/VTP, 3-4 Hr TAT - Swab, Nasopharynx [[id number]]  (Normal) Collected:  08/07/20 1402    Specimen:  Swab from Nasopharynx Updated:  08/07/20 1459     ADENOVIRUS, PCR Not Detected     Coronavirus 229E Not Detected     Coronavirus HKU1 Not Detected     Coronavirus NL63 Not Detected     Coronavirus OC43 Not Detected     COVID19 Not Detected     Human Metapneumovirus Not Detected     Human Rhinovirus/Enterovirus Not Detected     Influenza A PCR Not Detected     Influenza A H1 Not Detected     Influenza A H1 2009 PCR Not Detected     Influenza A H3 Not Detected     Influenza B PCR Not Detected     Parainfluenza Virus 1 Not Detected     Parainfluenza Virus 2 Not Detected     Parainfluenza Virus 3 Not Detected     Parainfluenza Virus 4 Not Detected     RSV, PCR Not Detected     Bordetella pertussis pcr Not Detected     Bordetella parapertussis PCR Not Detected     Chlamydophila pneumoniae PCR Not Detected     Mycoplasma pneumo by PCR Not Detected    Narrative:       Fact sheet for providers: https://docs.[employer]/wp-content/uploads/JMA1637-9806-JQ6.1-EUA-Provider-Fact-Sheet-3.pdf    Fact sheet for patients: https://docs.[employer]/wp-content/uploads/PFN6454-4874-MF9.1-EUA-Patient-Fact-Sheet-1.pdf    Urinalysis, Microscopic Only - Urine, Clean Catch [[id number]]  (Abnormal) Collected:  08/07/20 1141    Specimen:  Urine, Clean Catch Updated:  08/07/20 1159     RBC, UA 6-12 /HPF      WBC, UA 13-20 /HPF      Bacteria, UA None Seen /HPF      Squamous Epithelial Cells, UA 0-2 /HPF      Hyaline Casts, UA 7-12 /LPF      Methodology Automated Microscopy    Urinalysis With Culture If Indicated - Urine, Clean Catch [[id number]]  (Abnormal) Collected:  08/07/20 1141    Specimen:  Urine, Clean Catch Updated:  08/07/20  1159     Color, UA Yellow     Appearance, UA Clear     pH, UA <=5.0     Specific Gravity, UA 1.015     Glucose, UA Negative     Ketones, UA Negative     Bilirubin, UA Negative     Blood, UA Moderate (2+)     Protein,  mg/dL (2+)     Leuk Esterase, UA Small (1+)     Nitrite, UA Negative     Urobilinogen, UA 0.2 E.U./dL    [place] Draw [[id number]] Collected:  08/07/20 1039    Specimen:  Blood Updated:  08/07/20 1145    Narrative:       The following orders were created for panel order [place] Draw.  Procedure                               Abnormality         Status                     ---------                               -----------         ------                     Light Blue Top[[id number]]                                   Final result               Green Top (Gel)[[id number]]                                  Final result               Lavender Top[[id number]]                                     Final result               Gold Top - SST[[id number]]                                   Final result                 Please view results for these tests on the individual orders.    Light Blue Top [[id number]] Collected:  08/07/20 1039    Specimen:  Blood Updated:  08/07/20 1145     Extra Tube hold for add-on     Comment: Auto resulted       Green Top (Gel) [[id number]] Collected:  08/07/20 1039    Specimen:  Blood Updated:  08/07/20 1145     Extra Tube Hold for add-ons.     Comment: Auto resulted.       Lavender Top [[id number]] Collected:  08/07/20 1039    Specimen:  Blood Updated:  08/07/20 1145     Extra Tube hold for add-on     Comment: Auto resulted       Gold Top - SST [[id number]] Collected:  08/07/20 1039    Specimen:  Blood Updated:  08/07/20 1145     Extra Tube Hold for add-ons.     Comment: Auto resulted.       Magnesium [[id number]]  (Normal) Collected:  08/07/20 1039    Specimen:  Blood Updated:  08/07/20 1143     Magnesium 1.9 mg/dL     CK [[id number]]  (Normal) Collected:  08/07/20 1039    Specimen:  Blood  Updated:  08/07/20 1143     Creatine Kinase 25 U/L     BNP [[id number]]  (Normal) Collected:  08/07/20 1039    Specimen:  Blood Updated:  08/07/20 1139     proBNP 1,069.0 pg/mL     Narrative:       Among patients with dyspnea, NT-proBNP is highly sensitive for the detection of acute congestive heart failure. In addition NT-proBNP of <300 pg/ml effectively rules out acute congestive heart failure with 99% negative predictive value.    Results may be falsely decreased if patient taking Biotin.      Comprehensive Metabolic Panel [[id number]]  (Abnormal) Collected:  08/07/20 1039    Specimen:  Blood Updated:  08/07/20 1113     Glucose 115 mg/dL      BUN 38 mg/dL      Creatinine 1.65 mg/dL      Sodium 137 mmol/L      Potassium 3.8 mmol/L      Chloride 105 mmol/L      CO2 18.9 mmol/L      Calcium 10.2 mg/dL      Total Protein 6.7 g/dL      Albumin 2.80 g/dL      ALT (SGPT) 44 U/L      AST (SGOT) 37 U/L      Alkaline Phosphatase 171 U/L      Total Bilirubin 0.3 mg/dL      eGFR Non African Amer 30 mL/min/1.73      Globulin 3.9 gm/dL      A/G Ratio 0.7 g/dL      BUN/Creatinine Ratio 23.0     Anion Gap 13.1 mmol/L     Narrative:       GFR Normal >60  Chronic Kidney Disease <60  Kidney Failure <15      Troponin [[id number]]  (Normal) Collected:  08/07/20 1039    Specimen:  Blood Updated:  08/07/20 1113     Troponin T <0.010 ng/mL     Narrative:       Troponin T Reference Range:  <= 0.03 ng/mL-   Negative for AMI  >0.03 ng/mL-     Abnormal for myocardial necrosis.  Clinicians would have to utilize clinical acumen, EKG, Troponin and serial changes to determine if it is an Acute Myocardial Infarction or myocardial injury due to an underlying chronic condition.       Results may be falsely decreased if patient taking Biotin.      CBC & Differential [[id number]] Collected:  08/07/20 1039    Specimen:  Blood Updated:  08/07/20 1049    Narrative:       The following orders were created for panel order CBC & Differential.  Procedure                                Abnormality         Status                     ---------                               -----------         ------                     CBC Auto Differential[[id number]]        Abnormal            Final result                 Please view results for these tests on the individual orders.    CBC Auto Differential [[id number]]  (Abnormal) Collected:  08/07/20 1039    Specimen:  Blood Updated:  08/07/20 1049     WBC 17.07 10*3/mm3      RBC 3.41 10*6/mm3      Hemoglobin 10.2 g/dL      Hematocrit 31.3 %      MCV 91.8 fL      MCH 29.9 pg      MCHC 32.6 g/dL      RDW 12.7 %      RDW-SD 42.3 fl      MPV 8.6 fL      Platelets 622 10*3/mm3      Neutrophil % 85.7 %      Lymphocyte % 7.3 %      Monocyte % 4.3 %      Eosinophil % 0.6 %      Basophil % 0.6 %      Immature Grans % 1.5 %      Neutrophils, Absolute 14.62 10*3/mm3      Lymphocytes, Absolute 1.25 10*3/mm3      Monocytes, Absolute 0.74 10*3/mm3      Eosinophils, Absolute 0.10 10*3/mm3      Basophils, Absolute 0.11 10*3/mm3      Immature Grans, Absolute 0.25 10*3/mm3      nRBC 0.0 /100 WBC           Imaging Results (All)     Procedure Component Value Units Date/Time    CT Abdomen Pelvis With Contrast [[id number]] Collected:  08/08/20 1630     Updated:  08/08/20 2021    Narrative:       CT ABDOMEN AND PELVIS WITH IV CONTRAST     HISTORY: Weight loss, decreased appetite, anal cancer     TECHNIQUE: Radiation dose reduction techniques were utilized, including  automated exposure control and exposure modulation based on body size.   3 mm images were obtained through the abdomen and pelvis after the  administration of IV contrast.      COMPARISON: PET/CT dating back to 01/30/2020     FINDINGS: Evaluation is suboptimal due to respiratory motion artifact.     Bibasilar atelectasis and or pleural parenchymal scarring is present, as  before.     Small hiatal hernia is present. There are no findings of small bowel  obstruction. The appendix is unremarkable. The  liver, gallbladder,  pancreas, and spleen have an unremarkable postcontrast CT appearance.     Bilateral adrenal nodularity is grossly unchanged since 01/30/2020,  measuring 1.5 x 0.9 cm on the left and 1.6 x 1.1 cm on the right. These  areas have not demonstrated FDG avidity above that of background liver  on prior PET.     Cystic density lesion within the left kidney likely represents simple  cyst. Subcentimeter hypodense renal lesions are too small to  characterize. There is no hydronephrosis.     There is no abdominopelvic adenopathy by size criteria. Asymmetric  enlargement within the left adnexa is present measuring approximately  3.2 x 1.7 cm, grossly unchanged since 01/30/2020. This area is not  demonstrated significant FDG uptake on prior PET/CT.     There is irregular thickening of the pelvic floor which appears to be  asymmetric with prominent on the left. Colonic diverticulosis is  present. There is ill-defined soft tissue thickening within the  presacral soft tissues and lower pelvis which has increased since  06/16/2020. No free intraperitoneal air is present.     There are no suspicious lytic or blastic osseous lesions. Mild to  moderate anterolisthesis present at L5-S1 secondary to bilateral pars  defects.     Right small bowel containing inguinal hernia is present as seen since  01/30/2020. The uterus is surgically absent. The bladder is not well  evaluated.       Impression:       1.  Continued evolution of ill-defined soft tissue thickening and  stranding within the presacral soft tissues, likely related to ongoing  treatment for patient's primary malignancy. There is also thickening of  the pelvic floor appears to be asymmetric with more prominent on the  left; however this area is not well evaluated on noncontrast CT while  findings may be posttreatment related, residual neoplasm cannot be  excluded and continued attention on follow-up versus further evaluation  with PET/CT can be performed if  "clinically indicated.  2.  Small fat-containing right inguinal hernia without findings of  obstruction, as before.  3.  Asymmetric enlargement of the left adnexa, grossly unchanged since  01/30/2020 which has not demonstrated significant FDG uptake on PET CTs.  4.  Other findings as above.     This report was finalized on 8/8/2020 8:18 PM by Dr. Carlos Mackey M.D.       XR Chest 1 View [[id number]] Collected:  08/07/20 1102     Updated:  08/07/20 1106    Narrative:       XR CHEST 1 VW-  08/07/2020     HISTORY: Possible pneumonia.     Heart size is within normal limits. Lungs are slightly underinflated  with some vascular crowding. There is mild interstitial prominence of  the lungs which could be chronic in nature versus some mild interstitial  edema.     No focal infiltrates are seen.     There are no previous studies available for comparison.       Impression:       Mild interstitial prominence could be chronic in nature  versus some mild interstitial edema. This is somewhat exaggerated by  suboptimal inspiration.  2. No focal infiltrates are seen.     This report was finalized on 8/7/2020 11:03 AM by Dr. Matthias Landeros M.D.             Condition on Discharge:      Stable but guarded    Vital Signs:    /59 (BP Location: Right arm, Patient Position: Lying)   Pulse 89   Temp 97.8 °F (36.6 °C) (Skin)   Resp 16   Ht 154.9 cm (61\")   Wt 60.2 kg (132 lb 11.2 oz)   LMP  (LMP Unknown)   SpO2 94%   BMI 25.07 kg/m²     Physical Exam:    General: Alert and oriented x4, no acute distress  Heart: Regular rate and rhythm without murmur throw  Lungs: Clear to auscultation bilaterally without use of accessory muscles respiration  Abdomen: Soft/nontender/nondistended, no hepatosplenomegaly is noted.  MSK: 5/5 strength in upper/lower extremities bilaterally    Discharge Disposition:    Home or Self Care    Discharge Medication:       Discharge Medications      Continue These Medications      Instructions Start " Date   aspirin 81 MG EC tablet  Notes to patient:  Next dose due: tomorrow   81 mg, Oral, Daily      Cranberry 250 MG tablet  Notes to patient:  Next dose due: today   Oral      Multivitamin Adult tablet  Notes to patient:  Next dose due: today   Oral      PRESERVISION AREDS 2 PO  Notes to patient:  Next dose due: tonight   Oral, 2 Times Daily      ofloxacin 0.3 % ophthalmic solution  Commonly known as:  OCUFLOX  Notes to patient:  Next dose due: today   No dose, route, or frequency recorded.      pantoprazole 40 MG EC tablet  Commonly known as:  PROTONIX  Notes to patient:  Next dose due: tomorrow   No dose, route, or frequency recorded.      pravastatin 40 MG tablet  Commonly known as:  PRAVACHOL  Notes to patient:  Next dose due: tonight   40 mg, 2 Times Daily      vitamin C 500 MG tablet  Commonly known as:  ASCORBIC ACID  Notes to patient:  Next dose due: today   500 mg, Oral, Daily      vitamin E 400 UNIT capsule  Notes to patient:  Next dose due: today   400 Units, Oral, Daily         Stop These Medications    lisinopril 10 MG tablet  Commonly known as:  PRINIVIL,ZESTRIL            Discharge Diet:     Diet Instructions     Diet: Regular      Discharge Diet:  Regular          Activity at Discharge:     Activity Instructions     Activity as Tolerated            Follow-up Appointments:    Future Appointments   Date Time Provider Department Center   9/9/2020  1:20 PM Barry Stack MD [facility] None   11/25/2020  8:45 AM [facility]   12/3/2020  2:40 PM [facility] ONLY CBC [facility]   12/3/2020  3:00 PM Ezequiel Gray MD [facility]     Additional Instructions for the Follow-ups that You Need to Schedule     Discharge Follow-up with PCP   As directed       Currently Documented PCP:    Rosa Christianson MD    PCP Phone Number:    [phone]     Follow Up Details:  1 week         Discharge Follow-up with Specified Provider: dr Albright onc; 1 Week   As directed      To:    Jose Ramon onc    Follow Up:  1 Week               Test Results Pending at Discharge:     Order Current Status    BCR-ABL FISH In process    Flow Cytometry, Blood In process    JAK2 Mutation Analysis, Qual In process    Blood Culture - Blood, Arm, Left Preliminary result    Blood Culture - Blood, Arm, Right Preliminary result    Blood Culture - Blood, Arm, Right Preliminary result             Adair Lara DO  08/09/20  11:08

## 2020-08-09 NOTE — PLAN OF CARE
Problem: Patient Care Overview  Goal: Plan of Care Review  Outcome: Ongoing (interventions implemented as appropriate)  Flowsheets (Taken 8/9/2020 0136)  Progress: no change  Plan of Care Reviewed With: patient  Outcome Summary: patient resting comfortably between care, unstable with ambulation, no complaints of pain, educated on b/p monitoring and encouraged PO intake

## 2020-08-10 ENCOUNTER — TELEPHONE (OUTPATIENT)
Dept: ONCOLOGY | Facility: CLINIC | Age: 80
End: 2020-08-10

## 2020-08-10 NOTE — TELEPHONE ENCOUNTER
----- Message from Omi Laguerre MD sent at 8/9/2020  8:05 PM EDT -----  Regarding: FW: [facility]      ----- Message -----  From: Ezequiel Gray MD  Sent: 8/9/2020   4:24 PM EDT  To: Omi Laguerre MD  Subject: RE: [facility]?                        I will be glad to see this patient in follow-up.  Thank you, MDK  ----- Message -----  From: Omi Laguerre MD  Sent: 8/9/2020  11:31 AM EDT  To: Ezequiel Gray MD, #  Subject: [facility]?                            She was admitted with anorexia, weakness, and a fall.    WBC and PLT counts are up.  Anemia worse.  Labs sent and pending.    She needs to come back into the office in about 2 weeks or so with a CBC.  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  Please see if he could see her.  If not, I can.  CBC ordered.  Thanks, LAURA

## 2020-08-10 NOTE — PROGRESS NOTES
"Case Management Discharge Note      Final Note: DC\"d home. Allison Enamorado RN         Destination      No service has been selected for the patient.      Durable Medical Equipment      No service has been selected for the patient.      Dialysis/Infusion      No service has been selected for the patient.      Home Medical Care      No service has been selected for the patient.      Therapy      No service has been selected for the patient.      Community Resources      No service has been selected for the patient.        Transportation Services  Private: Car    Final Discharge Disposition Code: 01 - home or self-care  "

## 2020-08-10 NOTE — TELEPHONE ENCOUNTER
----- Message from Omi Laguerre MD sent at 8/9/2020 11:31 AM EDT -----  Regarding: hospital f/u, [place]?  She was admitted with anorexia, weakness, and a fall.    WBC and PLT counts are up.  Anemia worse.  Labs sent and pending.    She needs to come back into the office in about 2 weeks or so with a CBC.  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  Please see if he could see her.  If not, I can.  CBC ordered.  Thanks, LAURA

## 2020-08-11 LAB
REF LAB TEST METHOD: NORMAL
REF LAB TEST METHOD: NORMAL

## 2020-08-12 LAB
BACTERIA SPEC AEROBE CULT: NORMAL
BACTERIA SPEC AEROBE CULT: NORMAL

## 2020-08-13 LAB — BACTERIA SPEC AEROBE CULT: NORMAL

## 2020-08-18 LAB — REF LAB TEST METHOD: NORMAL

## 2020-08-20 NOTE — PROGRESS NOTES
Subjective Today the patient reports that she has not had an appetite and she complains of pain in her legs at a 10 on the pain score.      REASON FOR CONSULTATION: Anal carcinoma     REQUESTING PHYSICIAN: Rosa Christianson MD, Zen Franco MD    History of Present Illness patient is a 79-year-old female with the above-mentioned history .     The patient is a 79-year-old female followed (according to record) by primary care with depression, pulmonary hypertension, macular degeneration and retinal degeneration as well as aortic atherosclerosis.  She was seen December 10, 2019 by primary care plans to continue aspirin, lisinopril, pantoprazole and pravastatin.She had been noting bright red blood per rectum since the fall, 2019 with the presumption that she had hemorrhoidal bleeding.  She also indicates that she has been chronically constipated now requiring a laxative on a regular basis.  She has been tested previously with hemoccult but requested that this was not going to be helpful with her symptoms.  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     Review of the procedure note by Dr. Zen Franco January 3, 2020 indicates that there is a large mass at the anorectal area likely in the distal rectum, cauliflower with ulceration multiple biopsies taken. She has not been evaluated by colorectal surgery thus far.  The patient requested assessment and [facility] and is seen with her  and granddaughter.  She has not had weight loss, night sweats, fever, chills though has recognized change in bowel function as described above with bright red blood per rectum for several months.     The patient was assessed January 27  and felt to have an apparent anal carcinoma that extended into the rectum.  As result she was asked to undergo PET/CT scanning and assessment by colorectal surgery.  PET/CT demonstrated the primary tumor as an oblong tumor involving the 4 inferior SPECT SPECT the rectum measuring 5 cm x 3.6 cm with intense hypermetabolism at 33.1.  There is no evidence of disease within the abdomen pelvis with mild hypermetabolism with borderline enlarged bilateral hilar and subcarinal lymph nodes with SUV up to 6.0.  These are suspected to be inflammatory in nature.  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  The patient is now scheduled to be seen by radiation therapy on February 7, 2001.     The patient is seen back February 4, 2020 and we have again discussed concurrent chemoradiotherapy rather than surgery using concurrent 5-FU (in this case capecitabine)and mitomycin.  This is a combination of 10 mg/m²-maximum 15 mg IV of mitomycin given through a free-flowing line, capecitabine is 825 mg meter squared on radiation days through the completion of radiation therapy.  This is been discussed in detail with she and her  of approximately 45 minutes therapy for 2020.  We discussed assessing her for Xeloda toxicity, have her undergo teaching for the combination treatment, subsequently scheduling a PICC line, and to proceed to radiation therapy consultation as scheduled on February 7.  We are hopeful that we can start within the next week.   The patient went on to undergo teaching, had a PICC line placed February 11, 2020 and has been seen by radiation therapy with treatment to begin February 17, 2020.  She is seen back with her  and we discussed the overall plan in detail.  The patient went on to complete her therapy been seen March 23, 2020  completing her Xeloda and radiation therapy by April 27..  April 29 she had assessment by Dr. Stack with examination demonstrating dramatic improvement in the right posterior tumor nearly resolved externally.  As result of the epidemic her PET/CT was delayed until June 16, 2020 demonstrating significant decrease in the uptake within the distal rectum and anus and no findings of metastatic disease in the chest abdomen pelvis.    Additionally and importantly moderate to intense mediastinal bilateral hilar adenopathy is grossly unchanged from January 2020 thought to be reactive.The patient also been seen by Dr. Stack again Martha 15, 2020 with anoscopy performed and no tumor noted.     The patient is next evaluated June 18, 2020 indicating that she still has perirectal pain treated primarily, and most effectively, with Aquaphor.  Otherwise she feels well and is quite pleased about her results.    The patient presented to the emergency room August 7, 2020 due to weakness and a fall.  We did go on to reassess her via repeat CT of imaging and pelvis and also found leukocytosis with neutrophilia as well as thrombocytosis assessing peripheral blood flow cytometry, JAK2 and FISH for BCR ABL.     Imaging showed evolution of ill-defined soft tissue thickening and stranding within the presacral soft tissues, thickening of the pelvic floor thought to be symmetric possible posttreatment related, small fat-containing right inguinal hernia and asymmetric enlargement left adnexa unchanged.  Chest x-ray revealed moderate interstitial prominence thought to be chronic.  The patient's molecular testing was all negative, flow cytometry negative, iron studies and ferritin consistent with anemia chronic disease.  It was recognized the patient had significant weight loss since completing her chemoradiation therapy by March 2020 blood, fortunately there is no evidence of recurrent disease.  Notably her sed rate was elevated at greater than 140  though she was considerably anemic at the time this was drawn down to 8.6 hemoglobin.  She returns the office August 27, 2020 with her  Amaris and that her quality of life has suffered substantially since the completion of radiation therapy.  At that point she began to have further weakness, loss of appetite and weight loss.  A concern remains about the areas seen on her previous PET/CT that had been done in June with moderate mediastinal and bilateral hilar adenopathy that was unchanged from January with findings favored to be reactive.   This patient will need a follow-up PET/CT now and we plan to schedule this next available.      Past Medical History:   Diagnosis Date   • Anal cancer (CMS/HCC) 01/2020    SQUAMOUS CELL CARCINOMA, FOLLOWED BY DR. JOVITA BAUTISTA   • Aortic atherosclerosis (CMS/HCC)    • Arthritis    • Boil    • Chronic constipation    • Colon polyps     FOLLOWED BY DR. JOVITA BAUTISTA   • Depression    • Encounter for screening mammogram for breast cancer     09/2014, 10/2014, 11/2015, 11/2016, 11/2017, 11/2018, 11/2019.   • Exudative senile macular retinal degeneration (CMS/HCC)    • GERD (gastroesophageal reflux disease)     FOLLOWED BY DR. IMTIAZ SELLERS   • Hemorrhoids    • History of chemotherapy 2020    FOR ANAL CANCER, FOLLOWED BY DR. KASHMIR ARITA   • History of radiation therapy 2020    FOLLOWED BY DR. 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       Past Surgical History:   Procedure Laterality Date   • COLONOSCOPY W/ POLYPECTOMY N/A 01/03/2020    LARGE MASS IN ANORECTAL AREA, BX: SQUAMOUS CELL CARCINOMA, 3 TUBULAR ADENOMA POLYPS IN ASCENDING, 3 TUBULAR ADENOMA POLYPS IN TRANSVERSE, MELANOSIS COLI, DR. IMTIAZ SELLERS AT [facility]     • ENDOSCOPY N/A 01/03/2020    DR. IMTIAZ SELLERS AT [facility]   • HYSTERECTOMY N/A 1993        Current Outpatient Medications on File Prior to Visit   Medication Sig Dispense Refill   • aspirin 81 MG EC tablet Take 81 mg by mouth Daily.     • Cranberry 250 MG tablet Take  by mouth.     • Multiple Vitamins-Minerals (MULTIVITAMIN ADULT) tablet Take  by mouth.     • Multiple Vitamins-Minerals (PRESERVISION AREDS 2 PO) Take  by mouth 2 (Two) Times a Day.     • ofloxacin (OCUFLOX) 0.3 % ophthalmic solution      • pantoprazole (PROTONIX) 40 MG EC tablet      • pravastatin (PRAVACHOL) 40 MG tablet 40 mg 2 (Two) Times a Day.     • vitamin C (ASCORBIC ACID) 500 MG tablet Take 500 mg by mouth Daily.     • vitamin E 400 UNIT capsule Take 400 Units by mouth Daily.       No current facility-administered medications on file prior to visit.         ALLERGIES:    Allergies   Allergen Reactions   • Codeine Rash   • Lipitor [Atorvastatin] Rash        Social History     Socioeconomic History   • Marital status:      Spouse name: Dimitri   • Number of children: 2   • Years of education: High school   • Highest education level: Not on file   Occupational History     Employer: RETIRED   Tobacco Use   • Smoking status: Former Smoker     Packs/day: 0.50     Years: 40.00     Pack years: 20.00     Types: Cigarettes     Start date: 10/31/2004   • Smokeless tobacco: Never Used   Substance and Sexual Activity   • Alcohol use: Not Currently     Frequency: Never   • Drug use: Never   • Sexual activity: Yes     Partners: Male     Birth control/protection: Post-menopausal, Surgical     Comment: .        Family History   Problem Relation Age of Onset   • Cancer Sister         Lung Cancer   • Lung cancer Sister 61   • Brain cancer Sister    • Hypertension Brother    • Heart disease Brother    • Cancer Sister         Brain Cancer   • Lung cancer Sister 72   • Heart disease Father    • Cancer Sister    • Lung cancer Sister    •  "Cancer Sister    • Lung cancer Sister         Review of Systems   Constitutional: Positive for appetite change and fatigue. Negative for activity change, chills and fever.   HENT: Negative for mouth sores, nosebleeds and trouble swallowing.    Respiratory: Negative for cough and shortness of breath.    Cardiovascular: Negative for chest pain and leg swelling.   Gastrointestinal: Negative for abdominal pain, blood in stool, constipation, diarrhea, nausea, rectal pain and vomiting.   Genitourinary: Negative for difficulty urinating, pelvic pain and urgency.   Skin: Negative for rash.   Neurological: Positive for weakness. Negative for dizziness and numbness.   Hematological: Negative for adenopathy. Does not bruise/bleed easily.   Psychiatric/Behavioral: Negative for sleep disturbance.   3/17/2020 ROS unchanged from above except as updated.     Objective     Vitals:    08/27/20 0850   BP: 110/67   Pulse: 99   Resp: 16   Temp: 98 °F (36.7 °C)   TempSrc: Temporal   SpO2: 96%   Weight: 52 kg (114 lb 9.6 oz)   Height: 154.9 cm (60.98\")   PainSc: 10-Worst pain ever   PainLoc: Leg     Current Status 8/27/2020   ECOG score 0       Physical Exam   Constitutional: She is oriented to person, place, and time. She appears well-developed.   Generalized muscle loss, cachexia   HENT:   Mouth/Throat: Mucous membranes are normal. No oropharyngeal exudate.   Eyes: Pupils are equal, round, and reactive to light.   Neck: Normal range of motion.   Pulmonary/Chest: She has no rhonchi.   Abdominal: Normal appearance. There is no hepatosplenomegaly.   Musculoskeletal: Normal range of motion. She exhibits no edema.   Neurological: She is alert and oriented to person, place, and time.   Skin: Skin is warm and dry. No rash noted.   Psychiatric: She has a normal mood and affect.   Vitals reviewed.      RECENT LABS:  Hematology WBC   Date Value Ref Range Status   08/27/2020 20.28 (H) 3.40 - 10.80 10*3/mm3 Final     RBC   Date Value Ref Range " Status   08/27/2020 3.23 (L) 3.77 - 5.28 10*6/mm3 Final     Hemoglobin   Date Value Ref Range Status   08/27/2020 9.7 (L) 12.0 - 15.9 g/dL Final     Hematocrit   Date Value Ref Range Status   08/27/2020 30.3 (L) 34.0 - 46.6 % Final     Platelets   Date Value Ref Range Status   08/27/2020 560 (H) 140 - 450 10*3/mm3 Final        FDG PET/CT IMAGING SKULL BASE TO MID THIGH  1/30/2020    FINDINGS: The primary neoplasm is an oblong tumor mass involving the far  inferior aspect of the rectum and the pain is that measures  approximately 5 cm in greatest cephalocaudad dimension and 3.6 cm in  greatest mediolateral dimension. It demonstrates intense hypermetabolism  with a maximum measured as she 33.1 g/mL. There is no metabolic evidence  of metastatic disease within the abdomen or pelvis. Particular, no  hypermetabolic lymphadenopathy is identified. There is physiologic  distribution of the radiopharmaceutical within the neck. Imaging of the  chest shows mild hypermetabolism within a borderline enlarged bilateral  hilar and subcarinal lymph nodes. These have maximum SUV in the range of  6.0 g/mL. This would be a very usual location for isolated metastatic  disease from an anorectal neoplasm. These lymph nodes are favored to be  inflammatory in etiology.     IMPRESSION:  Intensely hypermetabolic neoplasm involving the far inferior  aspect of the rectum and essentially the entire anus. There is no  compelling metabolic evidence of metastatic disease within the neck,  chest, abdomen or pelvis.     This report was finalized on 1/31/2020 2:39 PM by Dr. Mayco Patel M.D.      F-18 FDG PET FROM SKULL BASE TO MID THIGH WITH PET/CT FUSION  6/16/2020  FINDINGS:  There is no cervical adenopathy demonstrating FDG uptake significantly  above that of blood pool.     The thyroid, submandibular and parotid glands demonstrate roughly  symmetric FDG uptake. There is a small hiatal hernia.     Moderate to intense bilateral hilar and  mediastinal lymph nodes are  present with index nodes as below:  *  Precarinal node measuring 1.1 cm in short axis dimension with a max  SUV of 4.6, grossly unchanged since 01/30/2020.  *  Left hilar node measuring 1 cm in short axis dimension and  demonstrating a max SUV of 5.9, as before.      There is no axillary adenopathy demonstrating FDG uptake significantly  above that of blood pool.     The heart is normal in size. Extensive coronary artery calcification is  present.     There is moderate emphysema. No pulmonary consolidation, pleural  effusion or pneumothorax is present. Sub-6 mm pulmonary nodule within  the right upper lobe is stable since 01/30/2020. There are no new  suspicious FDG avid pulmonary nodules.     There are no suspicious FDG avid lesions within the liver, gallbladder,  pancreas, spleen or kidneys. Bilateral adrenal nodules measure 1.6 cm on  the left and 1.3 cm on the right, demonstrate densities less than 10  Hounsfield units, are grossly unchanged since 01/30/2020 and does not  demonstrate FDG uptake significantly above that of the liver, likely  representing adenomas.     Cystic density lesion arising off the left kidney likely represents  simple cyst, as before. There are also left-sided parapelvic cysts. No  hydronephrosis is present. There is no abdominopelvic adenopathy  demonstrating FDG uptake significantly above that of blood pool.     There is colonic diverticulosis. The appendix is unremarkable.     Previously seen long segment of intense FDG uptake within the distal  rectum and anus has significantly decreased in FDG uptake demonstrating  a max SUV of 3.5 (previously 33.1.). The overall length of uptake has  also decreased. There is no free intraperitoneal air or fluid.     There are not suspicious lytic or blastic FDG avid osseous lesions.  Moderate anterolisthesis of L5 on S1 is present. Findings of pelvic  floor prolapse with an enterocele extending approximately 1 cm below  the  pubococcygeal line. The uterus is surgically absent.     IMPRESSION:  1.  Significant decrease in extent and degree of FDG uptake within the  distal rectum and anus as above. No findings of new metastatic disease  in the chest, abdomen or pelvis.  2.  Moderate to intense mediastinal and bilateral hilar adenopathy which  are grossly unchanged since 01/30/2020 and while findings are favored be  reactive, continued attention on follow-up is recommended to ensure  stability.  3.  Findings of bilateral adrenal adenomas, as before.  4.  Other findings as above.     This report was finalized on 6/18/2020 9:34 AM by Dr. Carlos Mackey M.D.             Assessment/Plan       80-year-old female followed by primary care as described above with development over the last 4 to 5 months of bright red blood per rectum that became associated with worsening constipation.  The patient symptoms, unfortunately, progressed until she had GI assessment that revealed a mass in the distal rectum/anal rectal region that was cauliflower in description with ulceration.  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  She has been advised that she has a rectal malignancy.                                              In discussing this with the patient and her family we discussed that a primary rectal squamous cell carcinomas is a rare disorder and one wonders whether this is not an anal carcinoma that has extended into the rectum.  They are, incidentally, treated like rectal adenocarcinomas with surgical resection but there are studies that use chemoradiotherapy.      This patient needs to be assessed by colorectal surgery and undergo additional staging endoscopically.  We have contacted Dr. Dara Stack's office and, as they review the record, have requested that the patient proceed with laboratory studies, PET/CT and colorectal surgery assessment.  Additional laboratory studies include a CEA of 3.07, no  evidence of iron deficiency, CMP with total protein mildly elevated at 8.3 g/dL.  This did occur and the the patient's studies suggest T2 N0 M0-stage IIA disease.      After review by colorectal surgery the patient is felt best treated by chemoradiation therapy and she is seen with her  February 4, 2020 at which time we discussed how this can be accomplished.  The patient proceeded to laboratory for DPD testing which is currently pending as she is seen February 12, 2020.  We have obtained Xeloda at a 25 mg meter squared to 1500 mg p.o. twice daily during days of radiation therapy with mitomycin also planned - 10 mg meter squared to 15 mg dosing anticipated day 1.    Treatment initiated on 2/17/2020 with concurrent chemotherapy and radiation.  Patient received IV mitomycin 15 mg, as well as initiated Xeloda 1500 Mg twice daily on days of radiation    2/24/2020 patient returns for evaluation.  Overall, tolerating treatment fairly well, other than some mild nausea.  .  Zofran has not been beneficial.  Will prescribe Compazine 10 mg every 6 hours as needed for nausea.  We will flush her PICC line today and change her dressing.  Patient will return in 1 week for reevaluation.    3/10/2020 patient returns for evaluation, starting to have significant discomfort in her anorectal region, particularly with bowel movements and sitting.  She does not have pain medication.  She is reluctant to try pain medication, but is willing to try something particularly to help her sleep at night.  We will prescribe tramadol 1 every 6 hours as needed for pain.    3/17/2020 Worsening pain. Tramadol only helping for about 2 hours.  After discussion and review with Dr. Gray, we will prescribe Norco 5/325 1 to 2 tablets every 6 hours as needed for pain.  I have warned her that this can cause issues with constipation.  Also, we will go ahead and remove her PICC line today, as her counts have remained stable.    Patient reassessed March  23, 2020, Norco more effective though she is having urinary spasm.  We discussed completing treatment and arranging for her reassessments approximately 6 weeks from now including radiologic as well as colorectal subsequent reviews.  The patient continue Xeloda and radiation therapy completing treatment by the end of March 2020.  She treated symptomatically thereafter and assessed periodically as possible during the COVID epidemic.  Review endoscopically in late April demonstrated further improvement and performed most recently Martha 15 also had no evidence of residual tumor endoscopically.     A follow-up PET done June 16 demonstrates a significant decrease in FDG uptake within the distal rectum and anus with no evidence of metastatic disease in the chest abdomen pelvis though there remains moderate to intense mediastinal bilateral hilar adenopathy that is unchanged from January 30 and thought to be reactive.  I have reviewed all these findings with the patient requesting that she undergo a repeat CT scan of the chest abdomen pelvis in approximately 6 months.          Though we had planned the above process the patient had subsequent hospitalization having presented to the emergency room August 7, 2020 due to weakness and a fall.  We did go on to reassess her via repeat CT of imaging and pelvis and also found leukocytosis with neutrophilia as well as thrombocytosis assessing peripheral blood flow cytometry, JAK2 and FISH for BCR ABL.     Imaging showed evolution of ill-defined soft tissue thickening and stranding within the presacral soft tissues, thickening of the pelvic floor thought to be symmetric possible posttreatment related, small fat-containing right inguinal hernia and asymmetric enlargement left adnexa unchanged.  Chest x-ray revealed moderate interstitial prominence thought to be chronic.  The patient's molecular testing was all negative, flow cytometry negative, iron studies and ferritin consistent with  anemia chronic disease.  It was recognized the patient had significant weight loss since completing her chemoradiation therapy by March 2020 blood, fortunately there is no evidence of recurrent disease.  Notably her sed rate was elevated at greater than 140 though she was considerably anemic at the time this was drawn down to 8.6 hemoglobin.  She returns the office August 27, 2020 with her  Amaris and that her quality of life has suffered substantially since the completion of radiation therapy.  At that point she began to have further weakness, loss of appetite and weight loss.  A concern remains about the areas seen on her previous PET/CT that had been done in June with moderate mediastinal and bilateral hilar adenopathy that was unchanged from January with findings favored to be reactive.   This patient will need a follow-up PET/CT now and we plan to schedule this next available.    Plan:  *Initiate prednisone 10 mg p.o. every morning with breakfast to stimulate appetite and performance status  *Norco 5/325 101-2 p.o. every 4 hours.  Pain E scribed to pharmacy  *Stool softeners to be restarted at least daily  *PET/CT in the next several days to week  *MD follow-up approximately 3 days lat

## 2020-08-27 ENCOUNTER — LAB (OUTPATIENT)
Dept: LAB | Facility: HOSPITAL | Age: 80
End: 2020-08-27

## 2020-08-27 ENCOUNTER — OFFICE VISIT (OUTPATIENT)
Dept: ONCOLOGY | Facility: CLINIC | Age: 80
End: 2020-08-27

## 2020-08-27 VITALS
WEIGHT: 114.6 LBS | SYSTOLIC BLOOD PRESSURE: 110 MMHG | HEART RATE: 99 BPM | TEMPERATURE: 98 F | BODY MASS INDEX: 21.64 KG/M2 | OXYGEN SATURATION: 96 % | HEIGHT: 61 IN | DIASTOLIC BLOOD PRESSURE: 67 MMHG | RESPIRATION RATE: 16 BRPM

## 2020-08-27 DIAGNOSIS — R91.8 OTHER NONSPECIFIC ABNORMAL FINDING OF LUNG FIELD: ICD-10-CM

## 2020-08-27 DIAGNOSIS — C21.0 ANAL CANCER (HCC): ICD-10-CM

## 2020-08-27 DIAGNOSIS — C21.0 ANAL CANCER (HCC): Primary | ICD-10-CM

## 2020-08-27 LAB
BASOPHILS # BLD AUTO: 0.12 10*3/MM3 (ref 0–0.2)
BASOPHILS NFR BLD AUTO: 0.6 % (ref 0–1.5)
DEPRECATED RDW RBC AUTO: 58.4 FL (ref 37–54)
EOSINOPHIL # BLD AUTO: 0.07 10*3/MM3 (ref 0–0.4)
EOSINOPHIL NFR BLD AUTO: 0.3 % (ref 0.3–6.2)
ERYTHROCYTE [DISTWIDTH] IN BLOOD BY AUTOMATED COUNT: 17.2 % (ref 12.3–15.4)
HCT VFR BLD AUTO: 30.3 % (ref 34–46.6)
HGB BLD-MCNC: 9.7 G/DL (ref 12–15.9)
IMM GRANULOCYTES # BLD AUTO: 0.37 10*3/MM3 (ref 0–0.05)
IMM GRANULOCYTES NFR BLD AUTO: 1.8 % (ref 0–0.5)
LYMPHOCYTES # BLD AUTO: 1.92 10*3/MM3 (ref 0.7–3.1)
LYMPHOCYTES NFR BLD AUTO: 9.5 % (ref 19.6–45.3)
MCH RBC QN AUTO: 30 PG (ref 26.6–33)
MCHC RBC AUTO-ENTMCNC: 32 G/DL (ref 31.5–35.7)
MCV RBC AUTO: 93.8 FL (ref 79–97)
MONOCYTES # BLD AUTO: 1.06 10*3/MM3 (ref 0.1–0.9)
MONOCYTES NFR BLD AUTO: 5.2 % (ref 5–12)
NEUTROPHILS NFR BLD AUTO: 16.74 10*3/MM3 (ref 1.7–7)
NEUTROPHILS NFR BLD AUTO: 82.6 % (ref 42.7–76)
NRBC BLD AUTO-RTO: 0 /100 WBC (ref 0–0.2)
PLATELET # BLD AUTO: 560 10*3/MM3 (ref 140–450)
PMV BLD AUTO: 9.5 FL (ref 6–12)
RBC # BLD AUTO: 3.23 10*6/MM3 (ref 3.77–5.28)
WBC # BLD AUTO: 20.28 10*3/MM3 (ref 3.4–10.8)

## 2020-08-27 PROCEDURE — 36415 COLL VENOUS BLD VENIPUNCTURE: CPT

## 2020-08-27 PROCEDURE — 85025 COMPLETE CBC W/AUTO DIFF WBC: CPT

## 2020-08-27 PROCEDURE — 99215 OFFICE O/P EST HI 40 MIN: CPT | Performed by: INTERNAL MEDICINE

## 2020-08-27 RX ORDER — PREDNISONE 10 MG/1
10 TABLET ORAL DAILY
Qty: 30 TABLET | Refills: 2 | Status: SHIPPED | OUTPATIENT
Start: 2020-08-27 | End: 2020-09-26

## 2020-08-27 RX ORDER — HYDROCODONE BITARTRATE AND ACETAMINOPHEN 5; 325 MG/1; MG/1
1 TABLET ORAL EVERY 4 HOURS PRN
Qty: 100 TABLET | Refills: 0 | Status: SHIPPED | OUTPATIENT
Start: 2020-08-27 | End: 2021-01-15

## 2020-09-01 ENCOUNTER — HOSPITAL ENCOUNTER (OUTPATIENT)
Dept: PET IMAGING | Facility: HOSPITAL | Age: 80
Discharge: HOME | End: 2020-09-01
Admitting: INTERNAL MEDICINE

## 2020-09-01 ENCOUNTER — HOSPITAL ENCOUNTER (OUTPATIENT)
Dept: PET IMAGING | Facility: HOSPITAL | Age: 80
Discharge: HOME | End: 2020-09-01

## 2020-09-01 DIAGNOSIS — C21.0 ANAL CANCER (HCC): ICD-10-CM

## 2020-09-01 DIAGNOSIS — R91.8 OTHER NONSPECIFIC ABNORMAL FINDING OF LUNG FIELD: ICD-10-CM

## 2020-09-01 LAB — GLUCOSE BLDC GLUCOMTR-MCNC: 110 MG/DL (ref 70–130)

## 2020-09-01 PROCEDURE — 82962 GLUCOSE BLOOD TEST: CPT

## 2020-09-01 PROCEDURE — 78815 PET IMAGE W/CT SKULL-THIGH: CPT

## 2020-09-01 PROCEDURE — A9552 F18 FDG: HCPCS | Performed by: INTERNAL MEDICINE

## 2020-09-01 PROCEDURE — 0 FLUDEOXYGLUCOSE F18 SOLUTION: Performed by: INTERNAL MEDICINE

## 2020-09-01 RX ADMIN — FLUDEOXYGLUCOSE F18 1 DOSE: 300 INJECTION INTRAVENOUS at 08:04

## 2020-09-04 ENCOUNTER — LAB (OUTPATIENT)
Dept: LAB | Facility: HOSPITAL | Age: 80
End: 2020-09-04

## 2020-09-04 ENCOUNTER — OFFICE VISIT (OUTPATIENT)
Dept: ONCOLOGY | Facility: CLINIC | Age: 80
End: 2020-09-04

## 2020-09-04 VITALS
TEMPERATURE: 97.3 F | OXYGEN SATURATION: 93 % | RESPIRATION RATE: 18 BRPM | BODY MASS INDEX: 22.09 KG/M2 | SYSTOLIC BLOOD PRESSURE: 150 MMHG | DIASTOLIC BLOOD PRESSURE: 78 MMHG | WEIGHT: 117 LBS | HEIGHT: 61 IN | HEART RATE: 69 BPM

## 2020-09-04 DIAGNOSIS — R63.4 WEIGHT LOSS: Primary | ICD-10-CM

## 2020-09-04 DIAGNOSIS — C21.0 ANAL CANCER (HCC): ICD-10-CM

## 2020-09-04 DIAGNOSIS — C18.9 MALIGNANT NEOPLASM OF COLON, UNSPECIFIED PART OF COLON (HCC): Primary | ICD-10-CM

## 2020-09-04 LAB
ALBUMIN SERPL-MCNC: 3.4 G/DL (ref 3.5–5.2)
ALBUMIN/GLOB SERPL: 0.9 G/DL (ref 1.1–2.4)
ALP SERPL-CCNC: 153 U/L (ref 38–116)
ALT SERPL W P-5'-P-CCNC: 39 U/L (ref 0–33)
ANION GAP SERPL CALCULATED.3IONS-SCNC: 11 MMOL/L (ref 5–15)
AST SERPL-CCNC: 29 U/L (ref 0–32)
BASOPHILS # BLD AUTO: 0.05 10*3/MM3 (ref 0–0.2)
BASOPHILS NFR BLD AUTO: 0.3 % (ref 0–1.5)
BILIRUB SERPL-MCNC: 0.2 MG/DL (ref 0.2–1.2)
BUN SERPL-MCNC: 39 MG/DL (ref 6–20)
BUN/CREAT SERPL: 25.8 (ref 7.3–30)
CALCIUM SPEC-SCNC: 9.4 MG/DL (ref 8.5–10.2)
CHLORIDE SERPL-SCNC: 99 MMOL/L (ref 98–107)
CHROMATIN AB SERPL-ACNC: 16.7 IU/ML (ref 0–14)
CO2 SERPL-SCNC: 26 MMOL/L (ref 22–29)
CREAT SERPL-MCNC: 1.51 MG/DL (ref 0.6–1.1)
DEPRECATED RDW RBC AUTO: 61.8 FL (ref 37–54)
EOSINOPHIL # BLD AUTO: 0.06 10*3/MM3 (ref 0–0.4)
EOSINOPHIL NFR BLD AUTO: 0.4 % (ref 0.3–6.2)
ERYTHROCYTE [DISTWIDTH] IN BLOOD BY AUTOMATED COUNT: 17.6 % (ref 12.3–15.4)
ERYTHROCYTE [SEDIMENTATION RATE] IN BLOOD: 116 MM/HR (ref 0–30)
GFR SERPL CREATININE-BSD FRML MDRD: 33 ML/MIN/1.73
GLOBULIN UR ELPH-MCNC: 4 GM/DL (ref 1.8–3.5)
GLUCOSE SERPL-MCNC: 91 MG/DL (ref 74–124)
HCT VFR BLD AUTO: 29.3 % (ref 34–46.6)
HGB BLD-MCNC: 9.3 G/DL (ref 12–15.9)
IMM GRANULOCYTES # BLD AUTO: 0.19 10*3/MM3 (ref 0–0.05)
IMM GRANULOCYTES NFR BLD AUTO: 1.2 % (ref 0–0.5)
LYMPHOCYTES # BLD AUTO: 2.01 10*3/MM3 (ref 0.7–3.1)
LYMPHOCYTES NFR BLD AUTO: 13.1 % (ref 19.6–45.3)
MCH RBC QN AUTO: 30.5 PG (ref 26.6–33)
MCHC RBC AUTO-ENTMCNC: 31.7 G/DL (ref 31.5–35.7)
MCV RBC AUTO: 96.1 FL (ref 79–97)
MONOCYTES # BLD AUTO: 0.92 10*3/MM3 (ref 0.1–0.9)
MONOCYTES NFR BLD AUTO: 6 % (ref 5–12)
NEUTROPHILS NFR BLD AUTO: 12.07 10*3/MM3 (ref 1.7–7)
NEUTROPHILS NFR BLD AUTO: 79 % (ref 42.7–76)
NRBC BLD AUTO-RTO: 0 /100 WBC (ref 0–0.2)
PLATELET # BLD AUTO: 391 10*3/MM3 (ref 140–450)
PMV BLD AUTO: 9.7 FL (ref 6–12)
POTASSIUM SERPL-SCNC: 4.4 MMOL/L (ref 3.5–4.7)
PROT SERPL-MCNC: 7.4 G/DL (ref 6.3–8)
RBC # BLD AUTO: 3.05 10*6/MM3 (ref 3.77–5.28)
SODIUM SERPL-SCNC: 136 MMOL/L (ref 134–145)
WBC # BLD AUTO: 15.3 10*3/MM3 (ref 3.4–10.8)

## 2020-09-04 PROCEDURE — 80053 COMPREHEN METABOLIC PANEL: CPT | Performed by: INTERNAL MEDICINE

## 2020-09-04 PROCEDURE — 85025 COMPLETE CBC W/AUTO DIFF WBC: CPT

## 2020-09-04 PROCEDURE — 85652 RBC SED RATE AUTOMATED: CPT | Performed by: INTERNAL MEDICINE

## 2020-09-04 PROCEDURE — 99214 OFFICE O/P EST MOD 30 MIN: CPT | Performed by: INTERNAL MEDICINE

## 2020-09-04 PROCEDURE — 36415 COLL VENOUS BLD VENIPUNCTURE: CPT

## 2020-09-04 PROCEDURE — 86431 RHEUMATOID FACTOR QUANT: CPT | Performed by: INTERNAL MEDICINE

## 2020-09-07 LAB
CENTROMERE B AB SER-ACNC: <0.2 AI (ref 0–0.9)
CHROMATIN AB SERPL-ACNC: <0.2 AI (ref 0–0.9)
DSDNA AB SER-ACNC: 1 IU/ML (ref 0–9)
ENA JO1 AB SER-ACNC: <0.2 AI (ref 0–0.9)
ENA RNP AB SER-ACNC: 0.3 AI (ref 0–0.9)
ENA SCL70 AB SER-ACNC: <0.2 AI (ref 0–0.9)
ENA SM AB SER-ACNC: <0.2 AI (ref 0–0.9)
ENA SS-A AB SER-ACNC: <0.2 AI (ref 0–0.9)
ENA SS-B AB SER-ACNC: <0.2 AI (ref 0–0.9)
Lab: NORMAL

## 2020-09-08 LAB — CCP IGA+IGG SERPL IA-ACNC: 5 UNITS (ref 0–19)

## 2020-09-09 ENCOUNTER — OFFICE VISIT (OUTPATIENT)
Dept: SURGERY | Facility: CLINIC | Age: 80
End: 2020-09-09

## 2020-09-09 VITALS
WEIGHT: 115.9 LBS | OXYGEN SATURATION: 94 % | HEIGHT: 61 IN | HEART RATE: 87 BPM | TEMPERATURE: 97.9 F | SYSTOLIC BLOOD PRESSURE: 150 MMHG | BODY MASS INDEX: 21.88 KG/M2 | DIASTOLIC BLOOD PRESSURE: 78 MMHG

## 2020-09-09 DIAGNOSIS — C21.0 ANAL CANCER (HCC): ICD-10-CM

## 2020-09-09 DIAGNOSIS — R29.898 WEAKNESS OF BOTH LEGS: Primary | ICD-10-CM

## 2020-09-09 PROCEDURE — 46600 DIAGNOSTIC ANOSCOPY SPX: CPT | Performed by: COLON & RECTAL SURGERY

## 2020-09-09 PROCEDURE — 99213 OFFICE O/P EST LOW 20 MIN: CPT | Performed by: COLON & RECTAL SURGERY

## 2020-09-09 NOTE — PROGRESS NOTES
"Benita Garcia is a 80 y.o. female in for follow up of Weakness of both legs    Anal cancer (CMS/HCC)    C/o leg pain  Trouble with balance and stamina  A lot of noturinal urination  Very upset with care received from [facility] recently  No rb  bm reg  No FI    /78 (BP Location: Left arm, Patient Position: Sitting, Cuff Size: Small Adult)   Pulse 87   Temp 97.9 °F (36.6 °C)   Ht 154.9 cm (61\")   Wt 52.6 kg (115 lb 14.4 oz)   LMP  (LMP Unknown)   SpO2 94%   Breastfeeding No   BMI 21.90 kg/m²   Body mass index is 21.9 kg/m².      PE:  Physical Exam   Constitutional: She appears well-developed. No distress.   HENT:   Head: Normocephalic and atraumatic.   Abdominal: Soft. She exhibits no distension.   Genitourinary:   Genitourinary Comments: Perianal exam: external hem - rosemary  TEGAN- decrease  tone, no masses  Anoscopy performed:   ROSEMARY     Musculoskeletal: Normal range of motion.   Neurological: She is alert.   Psychiatric: Thought content normal.         Assessment:   1. Weakness of both legs    2. Anal cancer (CMS/HCC)         Plan she needs physical therapy for stamina.  A consult was placed.  She is reluctant to do this.  For the anal cancer will continue every 3 month anoscopic exams        "

## 2020-10-01 ENCOUNTER — LAB (OUTPATIENT)
Dept: LAB | Facility: HOSPITAL | Age: 80
End: 2020-10-01

## 2020-10-01 ENCOUNTER — OFFICE VISIT (OUTPATIENT)
Dept: ONCOLOGY | Facility: CLINIC | Age: 80
End: 2020-10-01

## 2020-10-01 ENCOUNTER — APPOINTMENT (OUTPATIENT)
Dept: LAB | Facility: HOSPITAL | Age: 80
End: 2020-10-01

## 2020-10-01 VITALS
HEART RATE: 106 BPM | OXYGEN SATURATION: 94 % | SYSTOLIC BLOOD PRESSURE: 132 MMHG | TEMPERATURE: 97.5 F | RESPIRATION RATE: 18 BRPM | BODY MASS INDEX: 21.86 KG/M2 | HEIGHT: 61 IN | WEIGHT: 115.8 LBS | DIASTOLIC BLOOD PRESSURE: 84 MMHG

## 2020-10-01 DIAGNOSIS — C21.0 ANAL CANCER (HCC): Primary | ICD-10-CM

## 2020-10-01 DIAGNOSIS — R63.4 WEIGHT LOSS: ICD-10-CM

## 2020-10-01 DIAGNOSIS — C21.0 ANAL CANCER (HCC): ICD-10-CM

## 2020-10-01 DIAGNOSIS — M35.3 PMR (POLYMYALGIA RHEUMATICA) (HCC): ICD-10-CM

## 2020-10-01 DIAGNOSIS — D72.829 LEUKOCYTOSIS, UNSPECIFIED TYPE: ICD-10-CM

## 2020-10-01 LAB
BASOPHILS # BLD AUTO: 0.1 10*3/MM3 (ref 0–0.2)
BASOPHILS NFR BLD AUTO: 0.7 % (ref 0–1.5)
DEPRECATED RDW RBC AUTO: 55.6 FL (ref 37–54)
EOSINOPHIL # BLD AUTO: 0.15 10*3/MM3 (ref 0–0.4)
EOSINOPHIL NFR BLD AUTO: 1.1 % (ref 0.3–6.2)
ERYTHROCYTE [DISTWIDTH] IN BLOOD BY AUTOMATED COUNT: 15.8 % (ref 12.3–15.4)
HCT VFR BLD AUTO: 33.7 % (ref 34–46.6)
HGB BLD-MCNC: 10.8 G/DL (ref 12–15.9)
IMM GRANULOCYTES # BLD AUTO: 0.14 10*3/MM3 (ref 0–0.05)
IMM GRANULOCYTES NFR BLD AUTO: 1 % (ref 0–0.5)
LYMPHOCYTES # BLD AUTO: 2.64 10*3/MM3 (ref 0.7–3.1)
LYMPHOCYTES NFR BLD AUTO: 18.8 % (ref 19.6–45.3)
MCH RBC QN AUTO: 30.9 PG (ref 26.6–33)
MCHC RBC AUTO-ENTMCNC: 32 G/DL (ref 31.5–35.7)
MCV RBC AUTO: 96.3 FL (ref 79–97)
MONOCYTES # BLD AUTO: 1.38 10*3/MM3 (ref 0.1–0.9)
MONOCYTES NFR BLD AUTO: 9.8 % (ref 5–12)
NEUTROPHILS NFR BLD AUTO: 68.6 % (ref 42.7–76)
NEUTROPHILS NFR BLD AUTO: 9.62 10*3/MM3 (ref 1.7–7)
NRBC BLD AUTO-RTO: 0 /100 WBC (ref 0–0.2)
PLATELET # BLD AUTO: 331 10*3/MM3 (ref 140–450)
PMV BLD AUTO: 9.7 FL (ref 6–12)
RBC # BLD AUTO: 3.5 10*6/MM3 (ref 3.77–5.28)
WBC # BLD AUTO: 14.03 10*3/MM3 (ref 3.4–10.8)

## 2020-10-01 PROCEDURE — 85025 COMPLETE CBC W/AUTO DIFF WBC: CPT

## 2020-10-01 PROCEDURE — 36415 COLL VENOUS BLD VENIPUNCTURE: CPT

## 2020-10-01 PROCEDURE — 99213 OFFICE O/P EST LOW 20 MIN: CPT | Performed by: INTERNAL MEDICINE

## 2020-10-07 ENCOUNTER — DOCUMENTATION (OUTPATIENT)
Dept: ONCOLOGY | Facility: CLINIC | Age: 80
End: 2020-10-07

## 2020-10-07 NOTE — PROGRESS NOTES
Call placed to patient RE: open referral to survivorship clinic from Dr Beyer. Our office spoke to the patient and reviewed purpose and goals of survivorship visit as well as what to expect. Patient declines at present due to lack of perceived need. Patient encouraged to call anytime should they reconsider.

## 2020-11-05 ENCOUNTER — LAB (OUTPATIENT)
Dept: LAB | Facility: HOSPITAL | Age: 80
End: 2020-11-05

## 2020-11-05 DIAGNOSIS — R63.4 WEIGHT LOSS: ICD-10-CM

## 2020-11-05 DIAGNOSIS — M35.3 PMR (POLYMYALGIA RHEUMATICA) (HCC): ICD-10-CM

## 2020-11-05 LAB
ALBUMIN SERPL-MCNC: 4 G/DL (ref 3.5–5.2)
ALBUMIN/GLOB SERPL: 1 G/DL (ref 1.1–2.4)
ALP SERPL-CCNC: 90 U/L (ref 38–116)
ALT SERPL W P-5'-P-CCNC: 9 U/L (ref 0–33)
ANION GAP SERPL CALCULATED.3IONS-SCNC: 12.5 MMOL/L (ref 5–15)
AST SERPL-CCNC: 16 U/L (ref 0–32)
BASOPHILS # BLD AUTO: 0.06 10*3/MM3 (ref 0–0.2)
BASOPHILS NFR BLD AUTO: 0.6 % (ref 0–1.5)
BILIRUB SERPL-MCNC: 0.3 MG/DL (ref 0.2–1.2)
BUN SERPL-MCNC: 37 MG/DL (ref 6–20)
BUN/CREAT SERPL: 24 (ref 7.3–30)
CALCIUM SPEC-SCNC: 9.2 MG/DL (ref 8.5–10.2)
CHLORIDE SERPL-SCNC: 101 MMOL/L (ref 98–107)
CO2 SERPL-SCNC: 24.5 MMOL/L (ref 22–29)
CREAT SERPL-MCNC: 1.54 MG/DL (ref 0.6–1.1)
CRP SERPL-MCNC: 0.12 MG/DL (ref 0–0.5)
DEPRECATED RDW RBC AUTO: 53.1 FL (ref 37–54)
EOSINOPHIL # BLD AUTO: 0.04 10*3/MM3 (ref 0–0.4)
EOSINOPHIL NFR BLD AUTO: 0.4 % (ref 0.3–6.2)
ERYTHROCYTE [DISTWIDTH] IN BLOOD BY AUTOMATED COUNT: 14.6 % (ref 12.3–15.4)
ERYTHROCYTE [SEDIMENTATION RATE] IN BLOOD: 110 MM/HR (ref 0–30)
GFR SERPL CREATININE-BSD FRML MDRD: 33 ML/MIN/1.73
GLOBULIN UR ELPH-MCNC: 3.9 GM/DL (ref 1.8–3.5)
GLUCOSE SERPL-MCNC: 86 MG/DL (ref 74–124)
HCT VFR BLD AUTO: 36.8 % (ref 34–46.6)
HGB BLD-MCNC: 11.3 G/DL (ref 12–15.9)
IMM GRANULOCYTES # BLD AUTO: 0.05 10*3/MM3 (ref 0–0.05)
IMM GRANULOCYTES NFR BLD AUTO: 0.5 % (ref 0–0.5)
LYMPHOCYTES # BLD AUTO: 2.44 10*3/MM3 (ref 0.7–3.1)
LYMPHOCYTES NFR BLD AUTO: 22.8 % (ref 19.6–45.3)
MCH RBC QN AUTO: 30.6 PG (ref 26.6–33)
MCHC RBC AUTO-ENTMCNC: 30.7 G/DL (ref 31.5–35.7)
MCV RBC AUTO: 99.7 FL (ref 79–97)
MONOCYTES # BLD AUTO: 0.93 10*3/MM3 (ref 0.1–0.9)
MONOCYTES NFR BLD AUTO: 8.7 % (ref 5–12)
NEUTROPHILS NFR BLD AUTO: 67 % (ref 42.7–76)
NEUTROPHILS NFR BLD AUTO: 7.19 10*3/MM3 (ref 1.7–7)
NRBC BLD AUTO-RTO: 0 /100 WBC (ref 0–0.2)
PLATELET # BLD AUTO: 468 10*3/MM3 (ref 140–450)
PMV BLD AUTO: 8.4 FL (ref 6–12)
POTASSIUM SERPL-SCNC: 4 MMOL/L (ref 3.5–4.7)
PROT SERPL-MCNC: 7.9 G/DL (ref 6.3–8)
RBC # BLD AUTO: 3.69 10*6/MM3 (ref 3.77–5.28)
SODIUM SERPL-SCNC: 138 MMOL/L (ref 134–145)
WBC # BLD AUTO: 10.71 10*3/MM3 (ref 3.4–10.8)

## 2020-11-05 PROCEDURE — 80053 COMPREHEN METABOLIC PANEL: CPT

## 2020-11-05 PROCEDURE — 86140 C-REACTIVE PROTEIN: CPT | Performed by: INTERNAL MEDICINE

## 2020-11-05 PROCEDURE — 85652 RBC SED RATE AUTOMATED: CPT | Performed by: INTERNAL MEDICINE

## 2020-11-05 PROCEDURE — 85025 COMPLETE CBC W/AUTO DIFF WBC: CPT

## 2020-11-05 PROCEDURE — 36415 COLL VENOUS BLD VENIPUNCTURE: CPT

## 2020-11-05 NOTE — PROGRESS NOTES
Subjective Patient, again, considerably improved  on low-dose steroids    REASON FOR CONSULTATION: Anal carcinoma     REQUESTING PHYSICIAN: Rosa Christianson MD, Zen Franco MD    History of Present Illness      The patient is a 80-year-old female followed (according to record) by primary care with depression, pulmonary hypertension, macular degeneration and retinal degeneration as well as aortic atherosclerosis.  She was seen December 10, 2019 by primary care plans to continue aspirin, lisinopril, pantoprazole and pravastatin.She had been noting bright red blood per rectum since the fall, 2019 with the presumption that she had hemorrhoidal bleeding.  She also indicates that she has been chronically constipated now requiring a laxative on a regular basis.  She has been tested previously with hemoccult but requested that this was not going to be helpful with her symptoms.  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     Review of the procedure note by Dr. Zen Franco January 3, 2020 indicates that there is a large mass at the anorectal area likely in the distal rectum, cauliflower with ulceration multiple biopsies taken. She has not been evaluated by colorectal surgery thus far.  The patient requested assessment and [facility] and is seen with her  and granddaughter.  She has not had weight loss, night sweats, fever, chills though has recognized change in bowel function as described above with bright red blood per rectum for several months.     The patient was assessed January 27 and felt to have an apparent anal carcinoma that extended into the rectum.  As result she was asked to undergo PET/CT scanning and  assessment by colorectal surgery.  PET/CT demonstrated the primary tumor as an oblong tumor involving the 4 inferior SPECT SPECT the rectum measuring 5 cm x 3.6 cm with intense hypermetabolism at 33.1.  There is no evidence of disease within the abdomen pelvis with mild hypermetabolism with borderline enlarged bilateral hilar and subcarinal lymph nodes with SUV up to 6.0.  These are suspected to be inflammatory in nature.  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  The patient is now scheduled to be seen by radiation therapy on February 7, 2001.     The patient is seen back February 4, 2020 and we have again discussed concurrent chemoradiotherapy rather than surgery using concurrent 5-FU (in this case capecitabine)and mitomycin.  This is a combination of 10 mg/m²-maximum 15 mg IV of mitomycin given through a free-flowing line, capecitabine is 825 mg meter squared on radiation days through the completion of radiation therapy.  This is been discussed in detail with she and her  of approximately 45 minutes therapy for 2020.  We discussed assessing her for Xeloda toxicity, have her undergo teaching for the combination treatment, subsequently scheduling a PICC line, and to proceed to radiation therapy consultation as scheduled on February 7.  We are hopeful that we can start within the next week.   The patient went on to undergo teaching, had a PICC line placed February 11, 2020 and has been seen by radiation therapy with treatment to begin February 17, 2020.  She is seen back with her  and we discussed the overall plan in detail.  The patient went on to complete her therapy been seen March 23, 2020 completing her Xeloda and radiation therapy by April 27..  April 29 she had assessment by Dr. Stack with examination demonstrating dramatic  improvement in the right posterior tumor nearly resolved externally.  As result of the epidemic her PET/CT was delayed until June 16, 2020 demonstrating significant decrease in the uptake within the distal rectum and anus and no findings of metastatic disease in the chest abdomen pelvis.    Additionally and importantly moderate to intense mediastinal bilateral hilar adenopathy is grossly unchanged from January 2020 thought to be reactive.The patient also been seen by Dr. Stack again Martha 15, 2020 with anoscopy performed and no tumor noted.     The patient is next evaluated June 18, 2020 indicating that she still has perirectal pain treated primarily, and most effectively, with Aquaphor.  Otherwise she feels well and is quite pleased about her results.    The patient presented to the emergency room August 7, 2020 due to weakness and a fall.  We did go on to reassess her via repeat CT of imaging and pelvis and also found leukocytosis with neutrophilia as well as thrombocytosis assessing peripheral blood flow cytometry, JAK2 and FISH for BCR ABL.     Imaging showed evolution of ill-defined soft tissue thickening and stranding within the presacral soft tissues, thickening of the pelvic floor thought to be symmetric possible posttreatment related, small fat-containing right inguinal hernia and asymmetric enlargement left adnexa unchanged.  Chest x-ray revealed moderate interstitial prominence thought to be chronic.  The patient's molecular testing was all negative, flow cytometry negative, iron studies and ferritin consistent with anemia chronic disease.  It was recognized the patient had significant weight loss since completing her chemoradiation therapy by March 2020 blood, fortunately there is no evidence of recurrent disease.  Notably her sed rate was elevated at greater than 140 though she was considerably anemic at the time this was drawn down to 8.6 hemoglobin.  She returns the office August 27, 2020 with her   Amaris and that her quality of life has suffered substantially since the completion of radiation therapy.  At that point she began to have further weakness, loss of appetite and weight loss.  A concern remains about the areas seen on her previous PET/CT that had been done in June with moderate mediastinal and bilateral hilar adenopathy that was unchanged from January with findings favored to be reactive.   This patient will need a follow-up PET/CT now and we plan to schedule this next available.  We also want to place her on pain medications and low-dose steroids to reduce her symptoms and stimulate her performance status.  A PET/CT was obtained September 1, 2020 demonstrating no cervical adenopathy, no hilar, mediastinal or axillary adenopathy, mildly enlarged precarinal node, previous monitor intense FDG uptake in the mediastinum and bilateral hilar regions have resolved, moderate emphysema, sub-6 mm pulmonary nodule right upper, right middle and left upper lobe stable from June 2020, no pulmonary consolidation, pleural effusion or pneumothorax, no suspicious FDG avid pulmonary nodules, no findings of uptake within the liver, gallbladder, pancreas, spleen or kidneys, evidence of a left kidney simple cyst, hypodense left adrenal nodule measuring 1.9 x 1 x 2 unchanged thought to be adrenal adenoma, no abdominal pelvic lymphadenopathy demonstrating uptake.  There is asymmetric thickening in the left aspect of the pelvic floor representing the symmetric herniation of the left aspect of the vaginal wall posteriorly.  There is FDG uptake in the distal anus that measures 3.7 cm grossly unchanged from June 2020.  Finally there is no evidence of FDG avid lytic or blastic osseous lesion.  She is seen back with her  September 4, 2020 fortunately have improved dramatically with low-dose steroids.  She has gained 3 pounds, able to walk with less assistance and states her general discomfort and weakness are nearly  resolved.  It was elected to continue steroid doses and have her reevaluated approximately a month later.  She is seen with her  October 1, 2020 and both indicate that though she remains modestly weak in her lower extremities she has no other pain, stiffness in joints, has regained her appetite and regained her weight.  We have discussed that this process appears to possibly be similar to PMR considering the high sed rate, musculoskeletal symptoms and rapid response to steroids.  We elected to continue low-dose prednisone and the patient is next seen November 12, 2020 clearly improved!  She has normalized her activity and diet gaining significant weight.  Past Medical History:   Diagnosis Date   • Acute renal failure (CMS/HCC) 08/2020   • Anal cancer (CMS/HCC) 01/2020    SQUAMOUS CELL CARCINOMA, FOLLOWED BY DR. JOVITA BAUTISTA   • Aortic atherosclerosis (CMS/HCC)    • Arthritis    • Boil    • Chronic constipation    • Colon polyps     FOLLOWED BY DR. JOVITA BAUTISTA   • Dehydration 8/7/2020   • Depression    • Elevated liver enzymes 08/2020   • Encounter for screening mammogram for breast cancer     09/2014, 10/2014, 11/2015, 11/2016, 11/2017, 11/2018, 11/2019.   • Exudative senile macular retinal degeneration (CMS/HCC)    • Fall 08/07/2020    ADMITTED TO [facility]   • GERD (gastroesophageal reflux disease)     FOLLOWED BY DR. IMTIAZ SELLERS   • Hemorrhoids    • History of chemotherapy 2020    FOR ANAL CANCER, FOLLOWED BY DR. KASHMIR ARITA   • History of radiation therapy 2020    FOLLOWED BY DR. 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   • Weakness of both legs 09/2020       Past Surgical History:   Procedure  Laterality Date   • COLONOSCOPY W/ POLYPECTOMY N/A 01/03/2020    LARGE MASS IN ANORECTAL AREA, BX: SQUAMOUS CELL CARCINOMA, 3 TUBULAR ADENOMA POLYPS IN ASCENDING, 3 TUBULAR ADENOMA POLYPS IN TRANSVERSE, MELANOSIS COLI, DR. IMTIAZ SELLERS AT [facility]    • ENDOSCOPY N/A 01/03/2020    DR. IMTIAZ SELLERS AT [facility]   • HYSTERECTOMY N/A 1993        Current Outpatient Medications on File Prior to Visit   Medication Sig Dispense Refill   • aspirin 81 MG EC tablet Take 81 mg by mouth Daily.     • Cranberry 250 MG tablet Take  by mouth.     • HYDROcodone-acetaminophen (NORCO) 5-325 MG per tablet Take 1 tablet by mouth Every 4 (Four) Hours As Needed for Severe Pain . 100 tablet 0   • Multiple Vitamins-Minerals (MULTIVITAMIN ADULT) tablet Take  by mouth.     • Multiple Vitamins-Minerals (PRESERVISION AREDS 2 PO) Take  by mouth 2 (Two) Times a Day.     • ofloxacin (OCUFLOX) 0.3 % ophthalmic solution      • pantoprazole (PROTONIX) 40 MG EC tablet      • pravastatin (PRAVACHOL) 40 MG tablet 40 mg 2 (Two) Times a Day.     • predniSONE (DELTASONE) 10 MG tablet      • vitamin C (ASCORBIC ACID) 500 MG tablet Take 500 mg by mouth Daily.     • vitamin E 400 UNIT capsule Take 400 Units by mouth Daily.       No current facility-administered medications on file prior to visit.         ALLERGIES:    Allergies   Allergen Reactions   • Codeine Rash   • Lipitor [Atorvastatin] Rash        Social History     Socioeconomic History   • Marital status:      Spouse name: Dimitri   • Number of children: 2   • Years of education: High school   • Highest education level: Not on file   Occupational History     Employer: RETIRED   Tobacco Use   • Smoking status: Former Smoker     Packs/day: 0.50     Years: 40.00     Pack years: 20.00     Types: Cigarettes     Start date: 10/31/2004   • Smokeless tobacco: Never Used   Substance and Sexual Activity   • Alcohol use: Not Currently     Frequency: Never   • Drug use: Never   •  "Sexual activity: Yes     Partners: Male     Birth control/protection: Post-menopausal, Surgical     Comment: .        Family History   Problem Relation Age of Onset   • Cancer Sister         Lung Cancer   • Lung cancer Sister 61   • Brain cancer Sister    • Hypertension Brother    • Heart disease Brother    • Cancer Sister         Brain Cancer   • Lung cancer Sister 72   • Heart disease Father    • Cancer Sister    • Lung cancer Sister    • Cancer Sister    • Lung cancer Sister         Review of Systems   Constitutional: Negative for activity change, appetite change, chills, fatigue and fever.   HENT: Negative for mouth sores, nosebleeds and trouble swallowing.    Respiratory: Negative for cough and shortness of breath.    Cardiovascular: Negative for chest pain and leg swelling.   Gastrointestinal: Negative for abdominal pain, blood in stool, constipation, diarrhea, nausea, rectal pain and vomiting.   Genitourinary: Negative for difficulty urinating, pelvic pain and urgency.   Skin: Negative for rash.   Neurological: Negative for dizziness, weakness and numbness.   Hematological: Negative for adenopathy. Does not bruise/bleed easily.   Psychiatric/Behavioral: Negative for sleep disturbance.   3/17/2020 ROS unchanged from above except as updated.     Objective     Vitals:    11/12/20 1436   BP: 162/84   Pulse: 92   Resp: 18   Temp: 98.4 °F (36.9 °C)   TempSrc: Temporal   SpO2: 92%   Weight: 57.8 kg (127 lb 6.4 oz)   Height: 154.9 cm (60.98\")   PainSc: 0-No pain     Current Status 11/12/2020   ECOG score 0       Physical Exam   Constitutional: She is oriented to person, place, and time. She appears well-developed.   Mild improvement in muscular loss   HENT:   Mouth/Throat: No oropharyngeal exudate.   Eyes: Pupils are equal, round, and reactive to light.   Neck: Normal range of motion.   Pulmonary/Chest: She has no rhonchi.   Abdominal: Normal appearance.   Musculoskeletal: Normal range of motion. Deformity " (Ulnar deviation bilateral hands) present.   Neurological: She is alert and oriented to person, place, and time.   Skin: Skin is warm and dry. No rash noted.   Vitals reviewed.      RECENT LABS:  Hematology WBC   Date Value Ref Range Status   11/05/2020 10.71 3.40 - 10.80 10*3/mm3 Final     RBC   Date Value Ref Range Status   11/05/2020 3.69 (L) 3.77 - 5.28 10*6/mm3 Final     Hemoglobin   Date Value Ref Range Status   11/05/2020 11.3 (L) 12.0 - 15.9 g/dL Final     Hematocrit   Date Value Ref Range Status   11/05/2020 36.8 34.0 - 46.6 % Final     Platelets   Date Value Ref Range Status   11/05/2020 468 (H) 140 - 450 10*3/mm3 Final        FDG PET/CT IMAGING SKULL BASE TO MID THIGH  1/30/2020    FINDINGS: The primary neoplasm is an oblong tumor mass involving the far  inferior aspect of the rectum and the pain is that measures  approximately 5 cm in greatest cephalocaudad dimension and 3.6 cm in  greatest mediolateral dimension. It demonstrates intense hypermetabolism  with a maximum measured as she 33.1 g/mL. There is no metabolic evidence  of metastatic disease within the abdomen or pelvis. Particular, no  hypermetabolic lymphadenopathy is identified. There is physiologic  distribution of the radiopharmaceutical within the neck. Imaging of the  chest shows mild hypermetabolism within a borderline enlarged bilateral  hilar and subcarinal lymph nodes. These have maximum SUV in the range of  6.0 g/mL. This would be a very usual location for isolated metastatic  disease from an anorectal neoplasm. These lymph nodes are favored to be  inflammatory in etiology.     IMPRESSION:  Intensely hypermetabolic neoplasm involving the far inferior  aspect of the rectum and essentially the entire anus. There is no  compelling metabolic evidence of metastatic disease within the neck,  chest, abdomen or pelvis.     This report was finalized on 1/31/2020 2:39 PM by Dr. Mayco Patel M.D.      F-18 FDG PET FROM SKULL BASE TO MID THIGH  WITH PET/CT FUSION  6/16/2020  FINDINGS:  There is no cervical adenopathy demonstrating FDG uptake significantly  above that of blood pool.     The thyroid, submandibular and parotid glands demonstrate roughly  symmetric FDG uptake. There is a small hiatal hernia.     Moderate to intense bilateral hilar and mediastinal lymph nodes are  present with index nodes as below:  *  Precarinal node measuring 1.1 cm in short axis dimension with a max  SUV of 4.6, grossly unchanged since 01/30/2020.  *  Left hilar node measuring 1 cm in short axis dimension and  demonstrating a max SUV of 5.9, as before.      There is no axillary adenopathy demonstrating FDG uptake significantly  above that of blood pool.     The heart is normal in size. Extensive coronary artery calcification is  present.     There is moderate emphysema. No pulmonary consolidation, pleural  effusion or pneumothorax is present. Sub-6 mm pulmonary nodule within  the right upper lobe is stable since 01/30/2020. There are no new  suspicious FDG avid pulmonary nodules.     There are no suspicious FDG avid lesions within the liver, gallbladder,  pancreas, spleen or kidneys. Bilateral adrenal nodules measure 1.6 cm on  the left and 1.3 cm on the right, demonstrate densities less than 10  Hounsfield units, are grossly unchanged since 01/30/2020 and does not  demonstrate FDG uptake significantly above that of the liver, likely  representing adenomas.     Cystic density lesion arising off the left kidney likely represents  simple cyst, as before. There are also left-sided parapelvic cysts. No  hydronephrosis is present. There is no abdominopelvic adenopathy  demonstrating FDG uptake significantly above that of blood pool.     There is colonic diverticulosis. The appendix is unremarkable.     Previously seen long segment of intense FDG uptake within the distal  rectum and anus has significantly decreased in FDG uptake demonstrating  a max SUV of 3.5 (previously 33.1.).  The overall length of uptake has  also decreased. There is no free intraperitoneal air or fluid.     There are not suspicious lytic or blastic FDG avid osseous lesions.  Moderate anterolisthesis of L5 on S1 is present. Findings of pelvic  floor prolapse with an enterocele extending approximately 1 cm below the  pubococcygeal line. The uterus is surgically absent.     IMPRESSION:  1.  Significant decrease in extent and degree of FDG uptake within the  distal rectum and anus as above. No findings of new metastatic disease  in the chest, abdomen or pelvis.  2.  Moderate to intense mediastinal and bilateral hilar adenopathy which  are grossly unchanged since 01/30/2020 and while findings are favored be  reactive, continued attention on follow-up is recommended to ensure  stability.  3.  Findings of bilateral adrenal adenomas, as before.  4.  Other findings as above.     This report was finalized on 6/18/2020 9:34 AM by Dr. Carlos Mackey M.D.             Assessment/Plan       80-year-old female followed by primary care as described above with development over the last 4 to 5 months of bright red blood per rectum that became associated with worsening constipation.  The patient symptoms, unfortunately, progressed until she had GI assessment that revealed a mass in the distal rectum/anal rectal region that was cauliflower in description with ulceration.  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  She has been advised that she has a rectal malignancy.                                              In discussing this with the patient and her family we discussed that a primary rectal squamous cell carcinomas is a rare disorder and one wonders whether this is not an anal carcinoma that has extended into the rectum.  They are, incidentally, treated like rectal adenocarcinomas with surgical resection but there are studies that use chemoradiotherapy.      This patient needs to be assessed by  colorectal surgery and undergo additional staging endoscopically.  We have contacted Dr. Dara Stack's office and, as they review the record, have requested that the patient proceed with laboratory studies, PET/CT and colorectal surgery assessment.  Additional laboratory studies include a CEA of 3.07, no evidence of iron deficiency, CMP with total protein mildly elevated at 8.3 g/dL.  This did occur and the the patient's studies suggest T2 N0 M0-stage IIA disease.      After review by colorectal surgery the patient is felt best treated by chemoradiation therapy and she is seen with her  February 4, 2020 at which time we discussed how this can be accomplished.  The patient proceeded to laboratory for DPD testing which is currently pending as she is seen February 12, 2020.  We have obtained Xeloda at a 25 mg meter squared to 1500 mg p.o. twice daily during days of radiation therapy with mitomycin also planned - 10 mg meter squared to 15 mg dosing anticipated day 1.    Treatment initiated on 2/17/2020 with concurrent chemotherapy and radiation.  Patient received IV mitomycin 15 mg, as well as initiated Xeloda 1500 Mg twice daily on days of radiation    2/24/2020 patient returns for evaluation.  Overall, tolerating treatment fairly well, other than some mild nausea.  .  Zofran has not been beneficial.  Will prescribe Compazine 10 mg every 6 hours as needed for nausea.  We will flush her PICC line today and change her dressing.  Patient will return in 1 week for reevaluation.    3/10/2020 patient returns for evaluation, starting to have significant discomfort in her anorectal region, particularly with bowel movements and sitting.  She does not have pain medication.  She is reluctant to try pain medication, but is willing to try something particularly to help her sleep at night.  We will prescribe tramadol 1 every 6 hours as needed for pain.    3/17/2020 Worsening pain. Tramadol only helping for about 2 hours.   After discussion and review with Dr. Gray, we will prescribe Norco 5/325 1 to 2 tablets every 6 hours as needed for pain.  I have warned her that this can cause issues with constipation.  Also, we will go ahead and remove her PICC line today, as her counts have remained stable.    Patient reassessed March 23, 2020, Norco more effective though she is having urinary spasm.  We discussed completing treatment and arranging for her reassessments approximately 6 weeks from now including radiologic as well as colorectal subsequent reviews.  The patient continue Xeloda and radiation therapy completing treatment by the end of March 2020.  She treated symptomatically thereafter and assessed periodically as possible during the COVID epidemic.  Review endoscopically in late April demonstrated further improvement and performed most recently Martha 15 also had no evidence of residual tumor endoscopically.     A follow-up PET done June 16 demonstrates a significant decrease in FDG uptake within the distal rectum and anus with no evidence of metastatic disease in the chest abdomen pelvis though there remains moderate to intense mediastinal bilateral hilar adenopathy that is unchanged from January 30 and thought to be reactive.  I have reviewed all these findings with the patient requesting that she undergo a repeat CT scan of the chest abdomen pelvis in approximately 6 months.          Though we had planned the above process the patient had subsequent hospitalization having presented to the emergency room August 7, 2020 due to weakness and a fall.  We did go on to reassess her via repeat CT of imaging and pelvis and also found leukocytosis with neutrophilia as well as thrombocytosis assessing peripheral blood flow cytometry, JAK2 and FISH for BCR ABL.     Imaging showed evolution of ill-defined soft tissue thickening and stranding within the presacral soft tissues, thickening of the pelvic floor thought to be symmetric possible  posttreatment related, small fat-containing right inguinal hernia and asymmetric enlargement left adnexa unchanged.  Chest x-ray revealed moderate interstitial prominence thought to be chronic.  The patient's molecular testing was all negative, flow cytometry negative, iron studies and ferritin consistent with anemia chronic disease.      It was recognized the patient had significant weight loss since completing her chemoradiation therapy by March 2020 blood, fortunately there is no evidence of recurrent disease.  Notably her sed rate was elevated at greater than 140 though she was considerably anemic at the time this was drawn down to 8.6 hemoglobin.  She returns the office August 27, 2020 with her  and evidence that her quality of life has suffered substantially since the completion of radiation therapy.  At that point she began to have further weakness, loss of appetite and weight loss.  A concern remains about the areas seen on her previous PET/CT that had been done in June with moderate mediastinal and bilateral hilar adenopathy that was unchanged from January with findings favored to be reactive.  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  There is no focal increase at this site.  She has stable sub-6 mm pulmonary nodules present with no avidity.     She has improved significantly on low-dose steroids and we have discussed that underlying inflammatory condition may still be present suppressed by her steroid use.  Patient's follow-up testing did confirm an elevated rheumatoid factor but other studies negative while continue to have an elevated sed rate.  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  As the patient seen back November 12, 2020 she continues to thrive.  We discussed a relatively slow taper over 2 months of her prednisone.    Plan:  *Continue prednisone dropping to 5 mg p.o. daily x1 month then every other day 5 mg x 1 month    *2 months follow-up MD    *No overt evidence of malignancy present at this point.

## 2020-11-12 ENCOUNTER — OFFICE VISIT (OUTPATIENT)
Dept: ONCOLOGY | Facility: CLINIC | Age: 80
End: 2020-11-12

## 2020-11-12 ENCOUNTER — APPOINTMENT (OUTPATIENT)
Dept: LAB | Facility: HOSPITAL | Age: 80
End: 2020-11-12

## 2020-11-12 VITALS
TEMPERATURE: 98.4 F | DIASTOLIC BLOOD PRESSURE: 84 MMHG | BODY MASS INDEX: 24.05 KG/M2 | HEART RATE: 92 BPM | RESPIRATION RATE: 18 BRPM | HEIGHT: 61 IN | SYSTOLIC BLOOD PRESSURE: 162 MMHG | WEIGHT: 127.4 LBS | OXYGEN SATURATION: 92 %

## 2020-11-12 DIAGNOSIS — M35.3 PMR (POLYMYALGIA RHEUMATICA) (HCC): ICD-10-CM

## 2020-11-12 DIAGNOSIS — C21.0 ANAL CANCER (HCC): Primary | ICD-10-CM

## 2020-11-12 PROCEDURE — 99213 OFFICE O/P EST LOW 20 MIN: CPT | Performed by: INTERNAL MEDICINE

## 2020-11-12 RX ORDER — PREDNISONE 10 MG/1
TABLET ORAL
COMMUNITY
Start: 2020-10-04 | End: 2021-01-15

## 2020-11-25 ENCOUNTER — APPOINTMENT (OUTPATIENT)
Dept: PET IMAGING | Facility: HOSPITAL | Age: 80
End: 2020-11-25

## 2020-12-08 ENCOUNTER — TELEPHONE (OUTPATIENT)
Dept: SURGERY | Facility: CLINIC | Age: 80
End: 2020-12-08

## 2020-12-08 NOTE — TELEPHONE ENCOUNTER
Pt's  Milad called and wanted to know if yesterdays appt (He thought it was for today) can be done as a video visit instead instead of an office visit.    If so if we can call his cell phone #[phone] for the video visit, he states has done it before with another provider.    If ok for video visit, Please reply to Chandrika.  As I will be leaving the office at 3 pm for a doctor's appt.    Please advice.    Thank you.

## 2020-12-09 NOTE — TELEPHONE ENCOUNTER
Phoned pt and spoke with  Milad per Dr. Stack pt needs office visit for cancer surveillance, Milad voiced understanding and we scheduled for Jan/2021.    Thank you.

## 2020-12-16 ENCOUNTER — TELEPHONE (OUTPATIENT)
Dept: ONCOLOGY | Facility: CLINIC | Age: 80
End: 2020-12-16

## 2020-12-16 NOTE — TELEPHONE ENCOUNTER
Pt's granddaughter (Jayleen) called stating she was worried about her grandmother's mental state and memory. She states over the last 2-3 weeks she has been increasingly forgetful and even confused. Granddaughter states pt asks the same questions over and over without knowing she had already asked. This is new behavior for this patient. Message sent to Dr. Gray.      Ezequiel Gray MD Steitz, Lindsey G, RN             I am not worried about the scans.  She should taper off her prednisone at this point.  They should then continue to observe for her mental state and whether it improves or not.  Thanks, JAJA     Called and informed Jayleen. She will confirm with her grandmother and grandfather that the pt is continuing to taper the prednisone. Pt should be taking 5 mg every other day and should continue doing that for one month. Jayleen will verify that pt is taking the correct dose and will continue to monitor to see if confusion and forgetfulness improves with lower prednisone dose.

## 2021-01-13 NOTE — PROGRESS NOTES
Subjective  Today the patient reports that she has been having constant headaches and she had a fever last night    REASON FOR CONSULTATION: Anal carcinoma     REQUESTING PHYSICIAN: Rosa Christianson MD, Zen Franco MD    History of Present Illness      The patient is a 80-year-old female followed (according to record) by primary care with depression, pulmonary hypertension, macular degeneration and retinal degeneration as well as aortic atherosclerosis.  She was seen December 10, 2019 by primary care plans to continue aspirin, lisinopril, pantoprazole and pravastatin.She had been noting bright red blood per rectum since the fall, 2019 with the presumption that she had hemorrhoidal bleeding.  She also indicates that she has been chronically constipated now requiring a laxative on a regular basis.  She has been tested previously with hemoccult but requested that this was not going to be helpful with her symptoms.  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     Review of the procedure note by Dr. Zen Franco January 3, 2020 indicates that there is a large mass at the anorectal area likely in the distal rectum, cauliflower with ulceration multiple biopsies taken. She has not been evaluated by colorectal surgery thus far.  The patient requested assessment and [facility] and is seen with her  and granddaughter.  She has not had weight loss, night sweats, fever, chills though has recognized change in bowel function as described above with bright red blood per rectum for several months.     The patient was assessed January 27 and felt to have an apparent anal carcinoma that extended into the rectum.  As result she  was asked to undergo PET/CT scanning and assessment by colorectal surgery.  PET/CT demonstrated the primary tumor as an oblong tumor involving the 4 inferior SPECT SPECT the rectum measuring 5 cm x 3.6 cm with intense hypermetabolism at 33.1.  There is no evidence of disease within the abdomen pelvis with mild hypermetabolism with borderline enlarged bilateral hilar and subcarinal lymph nodes with SUV up to 6.0.  These are suspected to be inflammatory in nature.  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  The patient is now scheduled to be seen by radiation therapy on February 7, 2001.     The patient is seen back February 4, 2020 and we have again discussed concurrent chemoradiotherapy rather than surgery using concurrent 5-FU (in this case capecitabine)and mitomycin.  This is a combination of 10 mg/m²-maximum 15 mg IV of mitomycin given through a free-flowing line, capecitabine is 825 mg meter squared on radiation days through the completion of radiation therapy.  This is been discussed in detail with she and her  of approximately 45 minutes therapy for 2020.  We discussed assessing her for Xeloda toxicity, have her undergo teaching for the combination treatment, subsequently scheduling a PICC line, and to proceed to radiation therapy consultation as scheduled on February 7.  We are hopeful that we can start within the next week.   The patient went on to undergo teaching, had a PICC line placed February 11, 2020 and has been seen by radiation therapy with treatment to begin February 17, 2020.  She is seen back with her  and we discussed the overall plan in detail.  The patient went on to complete her therapy been seen March 23, 2020 completing her Xeloda and radiation therapy by April 27..  April 29 she had assessment by   Sreekanth with examination demonstrating dramatic improvement in the right posterior tumor nearly resolved externally.  As result of the epidemic her PET/CT was delayed until June 16, 2020 demonstrating significant decrease in the uptake within the distal rectum and anus and no findings of metastatic disease in the chest abdomen pelvis.    Additionally and importantly moderate to intense mediastinal bilateral hilar adenopathy is grossly unchanged from January 2020 thought to be reactive.The patient also been seen by Dr. Stack again Martha 15, 2020 with anoscopy performed and no tumor noted.     The patient is next evaluated June 18, 2020 indicating that she still has perirectal pain treated primarily, and most effectively, with Aquaphor.  Otherwise she feels well and is quite pleased about her results.    The patient presented to the emergency room August 7, 2020 due to weakness and a fall.  We did go on to reassess her via repeat CT of imaging and pelvis and also found leukocytosis with neutrophilia as well as thrombocytosis assessing peripheral blood flow cytometry, JAK2 and FISH for BCR ABL.     Imaging showed evolution of ill-defined soft tissue thickening and stranding within the presacral soft tissues, thickening of the pelvic floor thought to be symmetric possible posttreatment related, small fat-containing right inguinal hernia and asymmetric enlargement left adnexa unchanged.  Chest x-ray revealed moderate interstitial prominence thought to be chronic.  The patient's molecular testing was all negative, flow cytometry negative, iron studies and ferritin consistent with anemia chronic disease.  It was recognized the patient had significant weight loss since completing her chemoradiation therapy by March 2020 blood, fortunately there is no evidence of recurrent disease.  Notably her sed rate was elevated at greater than 140 though she was considerably anemic at the time this was drawn down to 8.6 hemoglobin.  She  returns the office August 27, 2020 with her  Amaris and that her quality of life has suffered substantially since the completion of radiation therapy.  At that point she began to have further weakness, loss of appetite and weight loss.  A concern remains about the areas seen on her previous PET/CT that had been done in June with moderate mediastinal and bilateral hilar adenopathy that was unchanged from January with findings favored to be reactive.   This patient will need a follow-up PET/CT now and we plan to schedule this next available.  We also want to place her on pain medications and low-dose steroids to reduce her symptoms and stimulate her performance status.  A PET/CT was obtained September 1, 2020 demonstrating no cervical adenopathy, no hilar, mediastinal or axillary adenopathy, mildly enlarged precarinal node, previous monitor intense FDG uptake in the mediastinum and bilateral hilar regions have resolved, moderate emphysema, sub-6 mm pulmonary nodule right upper, right middle and left upper lobe stable from June 2020, no pulmonary consolidation, pleural effusion or pneumothorax, no suspicious FDG avid pulmonary nodules, no findings of uptake within the liver, gallbladder, pancreas, spleen or kidneys, evidence of a left kidney simple cyst, hypodense left adrenal nodule measuring 1.9 x 1 x 2 unchanged thought to be adrenal adenoma, no abdominal pelvic lymphadenopathy demonstrating uptake.  There is asymmetric thickening in the left aspect of the pelvic floor representing the symmetric herniation of the left aspect of the vaginal wall posteriorly.  There is FDG uptake in the distal anus that measures 3.7 cm grossly unchanged from June 2020.  Finally there is no evidence of FDG avid lytic or blastic osseous lesion.  She is seen back with her  September 4, 2020 fortunately have improved dramatically with low-dose steroids.  She has gained 3 pounds, able to walk with less assistance and states  her general discomfort and weakness are nearly resolved.  It was elected to continue steroid doses and have her reevaluated approximately a month later.  She is seen with her  October 1, 2020 and both indicate that though she remains modestly weak in her lower extremities she has no other pain, stiffness in joints, has regained her appetite and regained her weight.  We have discussed that this process appears to possibly be similar to PMR considering the high sed rate, musculoskeletal symptoms and rapid response to steroids.  We elected to continue low-dose prednisone and the patient is next seen November 12, 2020 clearly improved!  She has normalized her activity and diet gaining significant weight.     Patient continued follow-up and is seen back January 20, 2021 with recent assessment by Dr. Bautisat with perianal exam negative, no masses, anoscopy with grade 1x3 internal hemorrhoids no evidence of disease.  Dr. Bautista notes, however, as does the  that the patient has worsening tremor, memory loss and fairly consistent headache that has been present for several weeks to month.  She is also fallen several times but, fortunately, without significant injury.      Past Medical History:   Diagnosis Date   • Acute renal failure (CMS/HCC) 08/2020   • Anal cancer (CMS/HCC) 01/2020    SQUAMOUS CELL CARCINOMA, FOLLOWED BY DR. JOVITA BAUTISTA   • Aortic atherosclerosis (CMS/HCC)    • Arthritis    • Boil    • Chronic constipation    • Colon polyps     FOLLOWED BY DR. JOVITA BAUTISTA   • Dehydration 8/7/2020   • Depression    • Elevated liver enzymes 08/2020   • Encounter for screening mammogram for breast cancer     09/2014, 10/2014, 11/2015, 11/2016, 11/2017, 11/2018, 11/2019.   • Exudative senile macular retinal degeneration (CMS/HCC)    • Fall 08/07/2020    ADMITTED TO [facility]   • GERD (gastroesophageal reflux disease)     FOLLOWED BY DR. IMTIAZ SELLERS   • Hemorrhoids    • History of chemotherapy 2020    FOR ANAL CANCER,  FOLLOWED BY DR. KASHMIR ARITA   • History of radiation therapy 2020    FOLLOWED BY DR. 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   • Weakness of both legs 09/2020       Past Surgical History:   Procedure Laterality Date   • COLONOSCOPY W/ POLYPECTOMY N/A 01/03/2020    LARGE MASS IN ANORECTAL AREA, BX: SQUAMOUS CELL CARCINOMA, 3 TUBULAR ADENOMA POLYPS IN ASCENDING, 3 TUBULAR ADENOMA POLYPS IN TRANSVERSE, MELANOSIS COLI, DR. IMTIAZ SELLERS AT [facility]    • ENDOSCOPY N/A 01/03/2020    DR. IMTIAZ SELLERS AT [facility]   • HYSTERECTOMY N/A 1993        Current Outpatient Medications on File Prior to Visit   Medication Sig Dispense Refill   • aspirin 81 MG EC tablet Take 81 mg by mouth Daily.     • Cranberry 250 MG tablet Take  by mouth.     • Multiple Vitamins-Minerals (MULTIVITAMIN ADULT) tablet Take  by mouth.     • Multiple Vitamins-Minerals (PRESERVISION AREDS 2 PO) Take  by mouth 2 (Two) Times a Day.     • pantoprazole (PROTONIX) 40 MG EC tablet      • pravastatin (PRAVACHOL) 40 MG tablet 40 mg 2 (Two) Times a Day.     • vitamin C (ASCORBIC ACID) 500 MG tablet Take 500 mg by mouth Daily.     • vitamin E 400 UNIT capsule Take 400 Units by mouth Daily.       No current facility-administered medications on file prior to visit.         ALLERGIES:    Allergies   Allergen Reactions   • Codeine Rash   • Lipitor [Atorvastatin] Rash        Social History     Socioeconomic History   • Marital status:      Spouse name: Dimitri   • Number of children: 2   • Years of education: High school   • Highest education level: Not on file   Occupational History     Employer: RETIRED   Tobacco Use   •  "Smoking status: Former Smoker     Packs/day: 0.50     Years: 40.00     Pack years: 20.00     Types: Cigarettes     Start date: 10/31/2004   • Smokeless tobacco: Never Used   Substance and Sexual Activity   • Alcohol use: Not Currently     Frequency: Never   • Drug use: Never   • Sexual activity: Yes     Partners: Male     Birth control/protection: Post-menopausal, Surgical     Comment: .        Family History   Problem Relation Age of Onset   • Cancer Sister         Lung Cancer   • Lung cancer Sister 61   • Brain cancer Sister    • Hypertension Brother    • Heart disease Brother    • Cancer Sister         Brain Cancer   • Lung cancer Sister 72   • Heart disease Father    • Cancer Sister    • Lung cancer Sister    • Cancer Sister    • Lung cancer Sister         Review of Systems   Constitutional: Negative for activity change, appetite change, chills, fatigue and fever.   HENT: Negative for mouth sores, nosebleeds and trouble swallowing.    Respiratory: Negative for cough and shortness of breath.    Cardiovascular: Negative for chest pain and leg swelling.   Gastrointestinal: Negative for abdominal pain, blood in stool, constipation, diarrhea, nausea, rectal pain and vomiting.   Genitourinary: Negative for difficulty urinating, pelvic pain and urgency.   Skin: Negative for rash.   Neurological: Positive for headaches. Negative for dizziness, weakness and numbness.   Hematological: Negative for adenopathy. Does not bruise/bleed easily.   Psychiatric/Behavioral: Negative for sleep disturbance.   All other systems reviewed and are negative.  3/17/2020 ROS unchanged from above except as updated.     Objective     Vitals:    01/20/21 1049   BP: 170/97   Pulse: 94   Resp: 18   Temp: 97.7 °F (36.5 °C)   TempSrc: Temporal   SpO2: 98%   Weight: 56.5 kg (124 lb 9.6 oz)   Height: 157.5 cm (62.01\")   PainSc: 0-No pain     Current Status 1/20/2021   ECOG score 0       Physical Exam   Constitutional: She is oriented to " person, place, and time. She appears well-developed.   Mild improvement in muscular loss   HENT:   Mouth/Throat: No oropharyngeal exudate.   Eyes: Pupils are equal, round, and reactive to light.   Neck: Normal range of motion.   Pulmonary/Chest: She has no rhonchi.   Abdominal: Normal appearance.   Musculoskeletal: Normal range of motion. Deformity (Ulnar deviation bilateral hands) present.   Neurological: She is alert and oriented to person, place, and time.   Skin: Skin is warm and dry. No rash noted.   Vitals reviewed.      RECENT LABS:  Hematology WBC   Date Value Ref Range Status   01/20/2021 8.50 3.40 - 10.80 10*3/mm3 Final     RBC   Date Value Ref Range Status   01/20/2021 3.92 3.77 - 5.28 10*6/mm3 Final     Hemoglobin   Date Value Ref Range Status   01/20/2021 12.4 12.0 - 15.9 g/dL Final     Hematocrit   Date Value Ref Range Status   01/20/2021 39.5 34.0 - 46.6 % Final     Platelets   Date Value Ref Range Status   01/20/2021 417 140 - 450 10*3/mm3 Final        FDG PET/CT IMAGING SKULL BASE TO MID THIGH  1/30/2020    FINDINGS: The primary neoplasm is an oblong tumor mass involving the far  inferior aspect of the rectum and the pain is that measures  approximately 5 cm in greatest cephalocaudad dimension and 3.6 cm in  greatest mediolateral dimension. It demonstrates intense hypermetabolism  with a maximum measured as she 33.1 g/mL. There is no metabolic evidence  of metastatic disease within the abdomen or pelvis. Particular, no  hypermetabolic lymphadenopathy is identified. There is physiologic  distribution of the radiopharmaceutical within the neck. Imaging of the  chest shows mild hypermetabolism within a borderline enlarged bilateral  hilar and subcarinal lymph nodes. These have maximum SUV in the range of  6.0 g/mL. This would be a very usual location for isolated metastatic  disease from an anorectal neoplasm. These lymph nodes are favored to be  inflammatory in etiology.     IMPRESSION:  Intensely  hypermetabolic neoplasm involving the far inferior  aspect of the rectum and essentially the entire anus. There is no  compelling metabolic evidence of metastatic disease within the neck,  chest, abdomen or pelvis.     This report was finalized on 1/31/2020 2:39 PM by Dr. Mayco Patel M.D.      F-18 FDG PET FROM SKULL BASE TO MID THIGH WITH PET/CT FUSION  6/16/2020  FINDINGS:  There is no cervical adenopathy demonstrating FDG uptake significantly  above that of blood pool.     The thyroid, submandibular and parotid glands demonstrate roughly  symmetric FDG uptake. There is a small hiatal hernia.     Moderate to intense bilateral hilar and mediastinal lymph nodes are  present with index nodes as below:  *  Precarinal node measuring 1.1 cm in short axis dimension with a max  SUV of 4.6, grossly unchanged since 01/30/2020.  *  Left hilar node measuring 1 cm in short axis dimension and  demonstrating a max SUV of 5.9, as before.      There is no axillary adenopathy demonstrating FDG uptake significantly  above that of blood pool.     The heart is normal in size. Extensive coronary artery calcification is  present.     There is moderate emphysema. No pulmonary consolidation, pleural  effusion or pneumothorax is present. Sub-6 mm pulmonary nodule within  the right upper lobe is stable since 01/30/2020. There are no new  suspicious FDG avid pulmonary nodules.     There are no suspicious FDG avid lesions within the liver, gallbladder,  pancreas, spleen or kidneys. Bilateral adrenal nodules measure 1.6 cm on  the left and 1.3 cm on the right, demonstrate densities less than 10  Hounsfield units, are grossly unchanged since 01/30/2020 and does not  demonstrate FDG uptake significantly above that of the liver, likely  representing adenomas.     Cystic density lesion arising off the left kidney likely represents  simple cyst, as before. There are also left-sided parapelvic cysts. No  hydronephrosis is present. There is no  abdominopelvic adenopathy  demonstrating FDG uptake significantly above that of blood pool.     There is colonic diverticulosis. The appendix is unremarkable.     Previously seen long segment of intense FDG uptake within the distal  rectum and anus has significantly decreased in FDG uptake demonstrating  a max SUV of 3.5 (previously 33.1.). The overall length of uptake has  also decreased. There is no free intraperitoneal air or fluid.     There are not suspicious lytic or blastic FDG avid osseous lesions.  Moderate anterolisthesis of L5 on S1 is present. Findings of pelvic  floor prolapse with an enterocele extending approximately 1 cm below the  pubococcygeal line. The uterus is surgically absent.     IMPRESSION:  1.  Significant decrease in extent and degree of FDG uptake within the  distal rectum and anus as above. No findings of new metastatic disease  in the chest, abdomen or pelvis.  2.  Moderate to intense mediastinal and bilateral hilar adenopathy which  are grossly unchanged since 01/30/2020 and while findings are favored be  reactive, continued attention on follow-up is recommended to ensure  stability.  3.  Findings of bilateral adrenal adenomas, as before.  4.  Other findings as above.     This report was finalized on 6/18/2020 9:34 AM by Dr. Carlos Mackey M.D.             Assessment/Plan       80-year-old female followed by primary care as described above with development over the last 4 to 5 months of bright red blood per rectum that became associated with worsening constipation.  The patient symptoms, unfortunately, progressed until she had GI assessment that revealed a mass in the distal rectum/anal rectal region that was cauliflower in description with ulceration.  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  She has been advised that she has a rectal malignancy.                                              In discussing this with the patient and her family we  discussed that a primary rectal squamous cell carcinomas is a rare disorder and one wonders whether this is not an anal carcinoma that has extended into the rectum.  They are, incidentally, treated like rectal adenocarcinomas with surgical resection but there are studies that use chemoradiotherapy.      This patient needs to be assessed by colorectal surgery and undergo additional staging endoscopically.  We have contacted Dr. Dara Stack's office and, as they review the record, have requested that the patient proceed with laboratory studies, PET/CT and colorectal surgery assessment.  Additional laboratory studies include a CEA of 3.07, no evidence of iron deficiency, CMP with total protein mildly elevated at 8.3 g/dL.  This did occur and the the patient's studies suggest T2 N0 M0-stage IIA disease.      After review by colorectal surgery the patient is felt best treated by chemoradiation therapy and she is seen with her  February 4, 2020 at which time we discussed how this can be accomplished.  The patient proceeded to laboratory for DPD testing which is currently pending as she is seen February 12, 2020.  We have obtained Xeloda at a 25 mg meter squared to 1500 mg p.o. twice daily during days of radiation therapy with mitomycin also planned - 10 mg meter squared to 15 mg dosing anticipated day 1.    Treatment initiated on 2/17/2020 with concurrent chemotherapy and radiation.  Patient received IV mitomycin 15 mg, as well as initiated Xeloda 1500 Mg twice daily on days of radiation    2/24/2020 patient returns for evaluation.  Overall, tolerating treatment fairly well, other than some mild nausea.  .  Zofran has not been beneficial.  Will prescribe Compazine 10 mg every 6 hours as needed for nausea.  We will flush her PICC line today and change her dressing.  Patient will return in 1 week for reevaluation.    3/10/2020 patient returns for evaluation, starting to have significant discomfort in her anorectal  region, particularly with bowel movements and sitting.  She does not have pain medication.  She is reluctant to try pain medication, but is willing to try something particularly to help her sleep at night.  We will prescribe tramadol 1 every 6 hours as needed for pain.    3/17/2020 Worsening pain. Tramadol only helping for about 2 hours.  After discussion and review with Dr. Gray, we will prescribe Norco 5/325 1 to 2 tablets every 6 hours as needed for pain.  I have warned her that this can cause issues with constipation.  Also, we will go ahead and remove her PICC line today, as her counts have remained stable.    Patient reassessed March 23, 2020, Norco more effective though she is having urinary spasm.  We discussed completing treatment and arranging for her reassessments approximately 6 weeks from now including radiologic as well as colorectal subsequent reviews.  The patient continue Xeloda and radiation therapy completing treatment by the end of March 2020.  She treated symptomatically thereafter and assessed periodically as possible during the COVID epidemic.  Review endoscopically in late April demonstrated further improvement and performed most recently Martha 15 also had no evidence of residual tumor endoscopically.     A follow-up PET done June 16 demonstrates a significant decrease in FDG uptake within the distal rectum and anus with no evidence of metastatic disease in the chest abdomen pelvis though there remains moderate to intense mediastinal bilateral hilar adenopathy that is unchanged from January 30 and thought to be reactive.  I have reviewed all these findings with the patient requesting that she undergo a repeat CT scan of the chest abdomen pelvis in approximately 6 months.          Though we had planned the above process the patient had subsequent hospitalization having presented to the emergency room August 7, 2020 due to weakness and a fall.  We did go on to reassess her via repeat CT of  imaging and pelvis and also found leukocytosis with neutrophilia as well as thrombocytosis assessing peripheral blood flow cytometry, JAK2 and FISH for BCR ABL.     Imaging showed evolution of ill-defined soft tissue thickening and stranding within the presacral soft tissues, thickening of the pelvic floor thought to be symmetric possible posttreatment related, small fat-containing right inguinal hernia and asymmetric enlargement left adnexa unchanged.  Chest x-ray revealed moderate interstitial prominence thought to be chronic.  The patient's molecular testing was all negative, flow cytometry negative, iron studies and ferritin consistent with anemia chronic disease.      It was recognized the patient had significant weight loss since completing her chemoradiation therapy by March 2020 blood, fortunately there is no evidence of recurrent disease.  Notably her sed rate was elevated at greater than 140 though she was considerably anemic at the time this was drawn down to 8.6 hemoglobin.  She returns the office August 27, 2020 with her  and evidence that her quality of life has suffered substantially since the completion of radiation therapy.  At that point she began to have further weakness, loss of appetite and weight loss.  A concern remains about the areas seen on her previous PET/CT that had been done in June with moderate mediastinal and bilateral hilar adenopathy that was unchanged from January with findings favored to be reactive.  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  There is no focal increase at this site.  She has stable sub-6 mm pulmonary nodules present with no avidity.     She has improved significantly on  low-dose steroids and we have discussed that underlying inflammatory condition may still be present suppressed by her steroid use.  Patient's follow-up testing did confirm an elevated rheumatoid factor but other studies negative while continue to have an elevated sed rate.  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  As the patient seen back November 12, 2020 she continues to thrive.  We discussed a relatively slow taper over 2 months of her prednisone.     The patient is seen back after she tapered steroids, review January 20, 2021.  There is no evidence of recurrence of her malignancy that was not concerned about her mental status including memory dysfunction, balance issues, chronic headache and worsening intention tremor.  Her neurologic exam is consistent for a degree of ataxia  Plan:  *We have discussed a follow-up assessment including MRI of the brain with and without contrast which we will schedule in the next 1 to 2 weeks.    *The patient be seen a week after her exams completed    *Medication reviewed, prednisone discontinued, pain meds discontinued, aspirin continued

## 2021-01-14 ENCOUNTER — TELEPHONE (OUTPATIENT)
Dept: ONCOLOGY | Facility: CLINIC | Age: 81
End: 2021-01-14

## 2021-01-14 NOTE — TELEPHONE ENCOUNTER
Pt's spouse calling about wife having frequent headaches.  He states she has had them for months.  States she just finished prednisone taper and they have not improved.  Reports pt taking Tylenol for headaches with temporary relief on occasion.  Asked pt if they still had Norco.  He could not find Norco, but did find Tramadol.  Instructed him to try Tramadol as directed to see if they help.  Pt has apt with Dr. Gray next week.  Asked spouse if he thought she should be seen sooner by a NP and he did not want to do that.  Instructed him to call us if things worsen or take her to ER.  Spouse v/u.

## 2021-01-15 ENCOUNTER — OFFICE VISIT (OUTPATIENT)
Dept: SURGERY | Facility: CLINIC | Age: 81
End: 2021-01-15

## 2021-01-15 ENCOUNTER — TELEPHONE (OUTPATIENT)
Dept: ONCOLOGY | Facility: CLINIC | Age: 81
End: 2021-01-15

## 2021-01-15 VITALS
SYSTOLIC BLOOD PRESSURE: 136 MMHG | OXYGEN SATURATION: 99 % | WEIGHT: 128.1 LBS | HEART RATE: 85 BPM | HEIGHT: 62 IN | BODY MASS INDEX: 23.57 KG/M2 | DIASTOLIC BLOOD PRESSURE: 76 MMHG

## 2021-01-15 DIAGNOSIS — R26.89 BALANCE PROBLEM: Primary | ICD-10-CM

## 2021-01-15 DIAGNOSIS — Z85.048 HISTORY OF ANAL CANCER: ICD-10-CM

## 2021-01-15 PROCEDURE — 99213 OFFICE O/P EST LOW 20 MIN: CPT | Performed by: COLON & RECTAL SURGERY

## 2021-01-15 PROCEDURE — 46600 DIAGNOSTIC ANOSCOPY SPX: CPT | Performed by: COLON & RECTAL SURGERY

## 2021-01-15 RX ORDER — ATENOLOL 25 MG/1
25 TABLET ORAL DAILY
COMMUNITY
End: 2021-01-15

## 2021-01-15 NOTE — TELEPHONE ENCOUNTER
Pt's granddaughter called to see if she could come to pt's appt. Due to pt's memory impairment. Reviewed with Loretta GAINES who said it was ok. Pt's granddaughter made aware.

## 2021-01-15 NOTE — PROGRESS NOTES
"Benita Garcia is a 80 y.o. female in for follow up of Balance problem    History of anal cancer  short term significant memory loss per   Did not do physical therapy  Tremor per patient  Has not discussed with primary care yet but has an appointment soon     /76 (BP Location: Left arm, Patient Position: Sitting, Cuff Size: Adult)   Pulse 85   Ht 157.5 cm (62\")   Wt 58.1 kg (128 lb 1.6 oz)   LMP  (LMP Unknown)   SpO2 99%   Breastfeeding No   BMI 23.43 kg/m²   Body mass index is 23.43 kg/m².    PE:  Physical Exam  Constitutional:       General: She is not in acute distress.     Appearance: She is well-developed.   HENT:      Head: Normocephalic and atraumatic.   Abdominal:      General: There is no distension.      Palpations: Abdomen is soft.   Genitourinary:     Comments: Perianal exam: external hem - wnl. jessica  TEGAN- decreased tone, no masses  Anoscopy performed:  Grade 1 x 3 internal hem,   jessica  Musculoskeletal: Normal range of motion.   Neurological:      Mental Status: She is alert.   Psychiatric:         Thought Content: Thought content normal.         Assessment:   1. Balance problem    2. History of anal cancer         Plan: Again discussed doing physical therapy for failure to thrive and balance issues.  Wrote an order again.  Discussed with patient that she will break a hip or fall and get a concussion if she does not do something.  Charo with patient and  that need to discuss memory and tremor issues with primary  Surveillance anoscope every 3 months for history of anal cancer      "

## 2021-01-18 DIAGNOSIS — C21.0 ANAL CANCER (HCC): Primary | ICD-10-CM

## 2021-01-20 ENCOUNTER — LAB (OUTPATIENT)
Dept: LAB | Facility: HOSPITAL | Age: 81
End: 2021-01-20

## 2021-01-20 ENCOUNTER — OFFICE VISIT (OUTPATIENT)
Dept: ONCOLOGY | Facility: CLINIC | Age: 81
End: 2021-01-20

## 2021-01-20 VITALS
RESPIRATION RATE: 18 BRPM | HEART RATE: 94 BPM | SYSTOLIC BLOOD PRESSURE: 170 MMHG | BODY MASS INDEX: 22.93 KG/M2 | TEMPERATURE: 97.7 F | HEIGHT: 62 IN | WEIGHT: 124.6 LBS | DIASTOLIC BLOOD PRESSURE: 97 MMHG | OXYGEN SATURATION: 98 %

## 2021-01-20 DIAGNOSIS — C21.0 ANAL CANCER (HCC): ICD-10-CM

## 2021-01-20 DIAGNOSIS — M35.3 PMR (POLYMYALGIA RHEUMATICA) (HCC): ICD-10-CM

## 2021-01-20 DIAGNOSIS — C21.0 ANAL CANCER (HCC): Primary | ICD-10-CM

## 2021-01-20 LAB
ALBUMIN SERPL-MCNC: 4.1 G/DL (ref 3.5–5.2)
ALBUMIN/GLOB SERPL: 1 G/DL (ref 1.1–2.4)
ALP SERPL-CCNC: 121 U/L (ref 38–116)
ALT SERPL W P-5'-P-CCNC: 7 U/L (ref 0–33)
ANION GAP SERPL CALCULATED.3IONS-SCNC: 13.3 MMOL/L (ref 5–15)
AST SERPL-CCNC: 18 U/L (ref 0–32)
BASOPHILS # BLD AUTO: 0.06 10*3/MM3 (ref 0–0.2)
BASOPHILS NFR BLD AUTO: 0.7 % (ref 0–1.5)
BILIRUB SERPL-MCNC: 0.3 MG/DL (ref 0.2–1.2)
BUN SERPL-MCNC: 34 MG/DL (ref 6–20)
BUN/CREAT SERPL: 18.7 (ref 7.3–30)
CALCIUM SPEC-SCNC: 10.1 MG/DL (ref 8.5–10.2)
CHLORIDE SERPL-SCNC: 103 MMOL/L (ref 98–107)
CO2 SERPL-SCNC: 21.7 MMOL/L (ref 22–29)
CREAT SERPL-MCNC: 1.82 MG/DL (ref 0.6–1.1)
DEPRECATED RDW RBC AUTO: 52.6 FL (ref 37–54)
EOSINOPHIL # BLD AUTO: 0.07 10*3/MM3 (ref 0–0.4)
EOSINOPHIL NFR BLD AUTO: 0.8 % (ref 0.3–6.2)
ERYTHROCYTE [DISTWIDTH] IN BLOOD BY AUTOMATED COUNT: 14 % (ref 12.3–15.4)
GFR SERPL CREATININE-BSD FRML MDRD: 27 ML/MIN/1.73
GLOBULIN UR ELPH-MCNC: 4.3 GM/DL (ref 1.8–3.5)
GLUCOSE SERPL-MCNC: 101 MG/DL (ref 74–124)
HCT VFR BLD AUTO: 39.5 % (ref 34–46.6)
HGB BLD-MCNC: 12.4 G/DL (ref 12–15.9)
IMM GRANULOCYTES # BLD AUTO: 0.02 10*3/MM3 (ref 0–0.05)
IMM GRANULOCYTES NFR BLD AUTO: 0.2 % (ref 0–0.5)
LYMPHOCYTES # BLD AUTO: 1.59 10*3/MM3 (ref 0.7–3.1)
LYMPHOCYTES NFR BLD AUTO: 18.7 % (ref 19.6–45.3)
MCH RBC QN AUTO: 31.6 PG (ref 26.6–33)
MCHC RBC AUTO-ENTMCNC: 31.4 G/DL (ref 31.5–35.7)
MCV RBC AUTO: 100.8 FL (ref 79–97)
MONOCYTES # BLD AUTO: 0.61 10*3/MM3 (ref 0.1–0.9)
MONOCYTES NFR BLD AUTO: 7.2 % (ref 5–12)
NEUTROPHILS NFR BLD AUTO: 6.15 10*3/MM3 (ref 1.7–7)
NEUTROPHILS NFR BLD AUTO: 72.4 % (ref 42.7–76)
NRBC BLD AUTO-RTO: 0 /100 WBC (ref 0–0.2)
PLATELET # BLD AUTO: 417 10*3/MM3 (ref 140–450)
PMV BLD AUTO: 8.6 FL (ref 6–12)
POTASSIUM SERPL-SCNC: 4.1 MMOL/L (ref 3.5–4.7)
PROT SERPL-MCNC: 8.4 G/DL (ref 6.3–8)
RBC # BLD AUTO: 3.92 10*6/MM3 (ref 3.77–5.28)
SODIUM SERPL-SCNC: 138 MMOL/L (ref 134–145)
WBC # BLD AUTO: 8.5 10*3/MM3 (ref 3.4–10.8)

## 2021-01-20 PROCEDURE — 85025 COMPLETE CBC W/AUTO DIFF WBC: CPT

## 2021-01-20 PROCEDURE — 99214 OFFICE O/P EST MOD 30 MIN: CPT | Performed by: INTERNAL MEDICINE

## 2021-01-20 PROCEDURE — 80053 COMPREHEN METABOLIC PANEL: CPT

## 2021-01-20 PROCEDURE — 36415 COLL VENOUS BLD VENIPUNCTURE: CPT

## 2021-01-22 ENCOUNTER — TELEPHONE (OUTPATIENT)
Dept: ONCOLOGY | Facility: CLINIC | Age: 81
End: 2021-01-22

## 2021-01-22 NOTE — TELEPHONE ENCOUNTER
Caller: CIRO RUBIN    Relationship to patient: [facility]    Best call back number: [phone]    ASKING IF THE OFFICE HAS OBTAINED THE AUTH NUMBER FOR PT'S MRI ON 02/04. ASKS FOR A CALL BACK TO LET HER KNOW AND TO ALSO PROVIDE HER WITH THE AUTH CODE

## 2021-02-04 ENCOUNTER — HOSPITAL ENCOUNTER (OUTPATIENT)
Dept: MRI IMAGING | Facility: HOSPITAL | Age: 81
Discharge: HOME | End: 2021-02-04
Admitting: INTERNAL MEDICINE

## 2021-02-04 DIAGNOSIS — M35.3 PMR (POLYMYALGIA RHEUMATICA) (HCC): ICD-10-CM

## 2021-02-04 DIAGNOSIS — C21.0 ANAL CANCER (HCC): ICD-10-CM

## 2021-02-04 PROCEDURE — 70553 MRI BRAIN STEM W/O & W/DYE: CPT

## 2021-02-04 PROCEDURE — 82565 ASSAY OF CREATININE: CPT

## 2021-02-04 PROCEDURE — A9585 GADOBUTROL INJECTION: HCPCS | Performed by: INTERNAL MEDICINE

## 2021-02-04 PROCEDURE — 0 GADOBUTROL 1 MMOL/ML SOLUTION: Performed by: INTERNAL MEDICINE

## 2021-02-04 RX ADMIN — GADOBUTROL 7 ML: 604.72 INJECTION INTRAVENOUS at 11:52

## 2021-02-04 NOTE — PROGRESS NOTES
Subjective  The patient is contacted by telephone and agrees to the visit.   She indicates that she remains with periodic headache and possible confusion.                                                                                                                                           REASON FOR CONSULTATION: Anal carcinoma     REQUESTING PHYSICIAN: Rosa Christianson MD, Zen Franco MD    History of Present Illness      The patient is a 80-year-old female followed (according to record) by primary care with depression, pulmonary hypertension, macular degeneration and retinal degeneration as well as aortic atherosclerosis.  She was seen December 10, 2019 by primary care plans to continue aspirin, lisinopril, pantoprazole and pravastatin.She had been noting bright red blood per rectum since the fall, 2019 with the presumption that she had hemorrhoidal bleeding.  She also indicates that she has been chronically constipated now requiring a laxative on a regular basis.  She has been tested previously with hemoccult but requested that this was not going to be helpful with her symptoms.  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     Review of the procedure note by Dr. Zen Franco January 3, 2020 indicates that there is a large mass at the anorectal area likely in the distal rectum, cauliflower with ulceration multiple biopsies taken. She has not been evaluated by colorectal surgery thus far.  The patient requested assessment and [facility] and is seen with her  and granddaughter.  She has not had weight loss, night sweats, fever, chills though has recognized change in bowel function as described above with  bright red blood per rectum for several months.     The patient was assessed January 27 and felt to have an apparent anal carcinoma that extended into the rectum.  As result she was asked to undergo PET/CT scanning and assessment by colorectal surgery.  PET/CT demonstrated the primary tumor as an oblong tumor involving the 4 inferior SPECT SPECT the rectum measuring 5 cm x 3.6 cm with intense hypermetabolism at 33.1.  There is no evidence of disease within the abdomen pelvis with mild hypermetabolism with borderline enlarged bilateral hilar and subcarinal lymph nodes with SUV up to 6.0.  These are suspected to be inflammatory in nature.  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  The patient is now scheduled to be seen by radiation therapy on February 7, 2001.     The patient is seen back February 4, 2020 and we have again discussed concurrent chemoradiotherapy rather than surgery using concurrent 5-FU (in this case capecitabine)and mitomycin.  This is a combination of 10 mg/m²-maximum 15 mg IV of mitomycin given through a free-flowing line, capecitabine is 825 mg meter squared on radiation days through the completion of radiation therapy.  This is been discussed in detail with she and her  of approximately 45 minutes therapy for 2020.  We discussed assessing her for Xeloda toxicity, have her undergo teaching for the combination treatment, subsequently scheduling a PICC line, and to proceed to radiation therapy consultation as scheduled on February 7.  We are hopeful that we can start within the next week.   The patient went on to undergo teaching, had a PICC line placed February 11, 2020 and has been seen by radiation therapy with treatment to begin February 17, 2020.  She is seen back with her  and we discussed the overall plan  in detail.  The patient went on to complete her therapy been seen March 23, 2020 completing her Xeloda and radiation therapy by April 27..  April 29 she had assessment by Dr. Stack with examination demonstrating dramatic improvement in the right posterior tumor nearly resolved externally.  As result of the epidemic her PET/CT was delayed until June 16, 2020 demonstrating significant decrease in the uptake within the distal rectum and anus and no findings of metastatic disease in the chest abdomen pelvis.    Additionally and importantly moderate to intense mediastinal bilateral hilar adenopathy is grossly unchanged from January 2020 thought to be reactive.The patient also been seen by Dr. Stack again Martha 15, 2020 with anoscopy performed and no tumor noted.     The patient is next evaluated June 18, 2020 indicating that she still has perirectal pain treated primarily, and most effectively, with Aquaphor.  Otherwise she feels well and is quite pleased about her results.    The patient presented to the emergency room August 7, 2020 due to weakness and a fall.  We did go on to reassess her via repeat CT of imaging and pelvis and also found leukocytosis with neutrophilia as well as thrombocytosis assessing peripheral blood flow cytometry, JAK2 and FISH for BCR ABL.     Imaging showed evolution of ill-defined soft tissue thickening and stranding within the presacral soft tissues, thickening of the pelvic floor thought to be symmetric possible posttreatment related, small fat-containing right inguinal hernia and asymmetric enlargement left adnexa unchanged.  Chest x-ray revealed moderate interstitial prominence thought to be chronic.  The patient's molecular testing was all negative, flow cytometry negative, iron studies and ferritin consistent with anemia chronic disease.  It was recognized the patient had significant weight loss since completing her chemoradiation therapy by March 2020 blood, fortunately there is no  evidence of recurrent disease.  Notably her sed rate was elevated at greater than 140 though she was considerably anemic at the time this was drawn down to 8.6 hemoglobin.  She returns the office August 27, 2020 with her  Amaris and that her quality of life has suffered substantially since the completion of radiation therapy.  At that point she began to have further weakness, loss of appetite and weight loss.  A concern remains about the areas seen on her previous PET/CT that had been done in June with moderate mediastinal and bilateral hilar adenopathy that was unchanged from January with findings favored to be reactive.   This patient will need a follow-up PET/CT now and we plan to schedule this next available.  We also want to place her on pain medications and low-dose steroids to reduce her symptoms and stimulate her performance status.  A PET/CT was obtained September 1, 2020 demonstrating no cervical adenopathy, no hilar, mediastinal or axillary adenopathy, mildly enlarged precarinal node, previous monitor intense FDG uptake in the mediastinum and bilateral hilar regions have resolved, moderate emphysema, sub-6 mm pulmonary nodule right upper, right middle and left upper lobe stable from June 2020, no pulmonary consolidation, pleural effusion or pneumothorax, no suspicious FDG avid pulmonary nodules, no findings of uptake within the liver, gallbladder, pancreas, spleen or kidneys, evidence of a left kidney simple cyst, hypodense left adrenal nodule measuring 1.9 x 1 x 2 unchanged thought to be adrenal adenoma, no abdominal pelvic lymphadenopathy demonstrating uptake.  There is asymmetric thickening in the left aspect of the pelvic floor representing the symmetric herniation of the left aspect of the vaginal wall posteriorly.  There is FDG uptake in the distal anus that measures 3.7 cm grossly unchanged from June 2020.  Finally there is no evidence of FDG avid lytic or blastic osseous lesion.  She is seen  back with her  September 4, 2020 fortunately have improved dramatically with low-dose steroids.  She has gained 3 pounds, able to walk with less assistance and states her general discomfort and weakness are nearly resolved.  It was elected to continue steroid doses and have her reevaluated approximately a month later.  She is seen with her  October 1, 2020 and both indicate that though she remains modestly weak in her lower extremities she has no other pain, stiffness in joints, has regained her appetite and regained her weight.  We have discussed that this process appears to possibly be similar to PMR considering the high sed rate, musculoskeletal symptoms and rapid response to steroids.  We elected to continue low-dose prednisone and the patient is next seen November 12, 2020 clearly improved!  She has normalized her activity and diet gaining significant weight.     Patient continued follow-up and is seen back January 20, 2021 with recent assessment by Dr. Stack with perianal exam negative, no masses, anoscopy with grade 1x3 internal hemorrhoids no evidence of disease.  Dr. Stack notes, however, as does the  that the patient has worsening tremor, memory loss and fairly consistent headache that has been present for several weeks to month.  She is also fallen several times but, fortunately, without significant injury.     As result of her neurologic dysfunction MRI of the brain was obtained February 4, 2021 with no evidence of metastatic disease, moderate small vessel ischemic disease and left frontal developmental venous anomaly noted.  The patient is contacted by telephone February 11 stating that she is doing fair with mild residual headache.  We have discussed other potential possibilities including her medication list and asked her to hold her pravastatin at this point.  Past Medical History:   Diagnosis Date   • Acute renal failure (CMS/[facility]) 08/2020   • Anal cancer (CMS/[facility]) 01/2020     SQUAMOUS CELL CARCINOMA, FOLLOWED BY DR. JOVITA BAUTISTA   • Aortic atherosclerosis (CMS/HCC)    • Arthritis    • Boil    • Chronic constipation    • Colon polyps     FOLLOWED BY DR. JOVITA BAUTISTA   • Dehydration 8/7/2020   • Depression    • Elevated liver enzymes 08/2020   • Encounter for screening mammogram for breast cancer     09/2014, 10/2014, 11/2015, 11/2016, 11/2017, 11/2018, 11/2019.   • Exudative senile macular retinal degeneration (CMS/HCC)    • Fall 08/07/2020    ADMITTED TO [facility]   • GERD (gastroesophageal reflux disease)     FOLLOWED BY DR. IMTIAZ SELLERS   • Hemorrhoids    • History of chemotherapy 2020    FOR ANAL CANCER, FOLLOWED BY DR. KASHMIR ARITA   • History of radiation therapy 2020    FOLLOWED BY DR. 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   • Weakness of both legs 09/2020       Past Surgical History:   Procedure Laterality Date   • COLONOSCOPY W/ POLYPECTOMY N/A 01/03/2020    LARGE MASS IN ANORECTAL AREA, BX: SQUAMOUS CELL CARCINOMA, 3 TUBULAR ADENOMA POLYPS IN ASCENDING, 3 TUBULAR ADENOMA POLYPS IN TRANSVERSE, MELANOSIS COLI, DR. IMTIAZ SELLERS AT [facility]    • ENDOSCOPY N/A 01/03/2020    DR. IMTIAZ SELLERS AT [facility]   • HYSTERECTOMY N/A 1993        Current Outpatient Medications on File Prior to Visit   Medication Sig Dispense Refill   • aspirin 81 MG EC tablet Take 81 mg by mouth Daily.     • Cranberry 250 MG tablet Take  by mouth.     • Multiple Vitamins-Minerals (MULTIVITAMIN ADULT) tablet Take  by mouth.     • Multiple Vitamins-Minerals (PRESERVISION AREDS 2 PO) Take  by mouth 2 (Two) Times a Day.     • pantoprazole (PROTONIX) 40 MG EC tablet      •  pravastatin (PRAVACHOL) 40 MG tablet 40 mg 2 (Two) Times a Day.     • vitamin C (ASCORBIC ACID) 500 MG tablet Take 500 mg by mouth Daily.     • vitamin E 400 UNIT capsule Take 400 Units by mouth Daily.       No current facility-administered medications on file prior to visit.         ALLERGIES:    Allergies   Allergen Reactions   • Codeine Rash   • Lipitor [Atorvastatin] Rash        Social History     Socioeconomic History   • Marital status:      Spouse name: Dimitri   • Number of children: 2   • Years of education: High school   • Highest education level: Not on file   Occupational History     Employer: RETIRED   Tobacco Use   • Smoking status: Former Smoker     Packs/day: 0.50     Years: 40.00     Pack years: 20.00     Types: Cigarettes     Start date: 10/31/2004   • Smokeless tobacco: Never Used   Substance and Sexual Activity   • Alcohol use: Not Currently     Frequency: Never   • Drug use: Never   • Sexual activity: Yes     Partners: Male     Birth control/protection: Post-menopausal, Surgical     Comment: .        Family History   Problem Relation Age of Onset   • Cancer Sister         Lung Cancer   • Lung cancer Sister 61   • Brain cancer Sister    • Hypertension Brother    • Heart disease Brother    • Cancer Sister         Brain Cancer   • Lung cancer Sister 72   • Heart disease Father    • Cancer Sister    • Lung cancer Sister    • Cancer Sister    • Lung cancer Sister         Review of Systems   Constitutional: Negative for activity change, appetite change, chills, fatigue and fever.   HENT: Negative for mouth sores, nosebleeds and trouble swallowing.    Respiratory: Negative for cough and shortness of breath.    Cardiovascular: Negative for chest pain and leg swelling.   Gastrointestinal: Negative for abdominal pain, blood in stool, constipation, diarrhea, nausea, rectal pain and vomiting.   Genitourinary: Negative for difficulty urinating, pelvic pain and urgency.   Skin: Negative for rash.    Neurological: Positive for headaches. Negative for dizziness, weakness and numbness.   Hematological: Negative for adenopathy. Does not bruise/bleed easily.   Psychiatric/Behavioral: Negative for sleep disturbance.   All other systems reviewed and are negative.      Objective     There were no vitals filed for this visit.  Current Status 1/20/2021   ECOG score 0         RECENT LABS:  Hematology WBC   Date Value Ref Range Status   01/20/2021 8.50 3.40 - 10.80 10*3/mm3 Final     RBC   Date Value Ref Range Status   01/20/2021 3.92 3.77 - 5.28 10*6/mm3 Final     Hemoglobin   Date Value Ref Range Status   01/20/2021 12.4 12.0 - 15.9 g/dL Final     Hematocrit   Date Value Ref Range Status   01/20/2021 39.5 34.0 - 46.6 % Final     Platelets   Date Value Ref Range Status   01/20/2021 417 140 - 450 10*3/mm3 Final        FDG PET/CT IMAGING SKULL BASE TO MID THIGH  1/30/2020    FINDINGS: The primary neoplasm is an oblong tumor mass involving the far  inferior aspect of the rectum and the pain is that measures  approximately 5 cm in greatest cephalocaudad dimension and 3.6 cm in  greatest mediolateral dimension. It demonstrates intense hypermetabolism  with a maximum measured as she 33.1 g/mL. There is no metabolic evidence  of metastatic disease within the abdomen or pelvis. Particular, no  hypermetabolic lymphadenopathy is identified. There is physiologic  distribution of the radiopharmaceutical within the neck. Imaging of the  chest shows mild hypermetabolism within a borderline enlarged bilateral  hilar and subcarinal lymph nodes. These have maximum SUV in the range of  6.0 g/mL. This would be a very usual location for isolated metastatic  disease from an anorectal neoplasm. These lymph nodes are favored to be  inflammatory in etiology.     IMPRESSION:  Intensely hypermetabolic neoplasm involving the far inferior  aspect of the rectum and essentially the entire anus. There is no  compelling metabolic evidence of  metastatic disease within the neck,  chest, abdomen or pelvis.     This report was finalized on 1/31/2020 2:39 PM by Dr. Mayco Patel M.D.      F-18 FDG PET FROM SKULL BASE TO MID THIGH WITH PET/CT FUSION  6/16/2020  FINDINGS:  There is no cervical adenopathy demonstrating FDG uptake significantly  above that of blood pool.     The thyroid, submandibular and parotid glands demonstrate roughly  symmetric FDG uptake. There is a small hiatal hernia.     Moderate to intense bilateral hilar and mediastinal lymph nodes are  present with index nodes as below:  *  Precarinal node measuring 1.1 cm in short axis dimension with a max  SUV of 4.6, grossly unchanged since 01/30/2020.  *  Left hilar node measuring 1 cm in short axis dimension and  demonstrating a max SUV of 5.9, as before.      There is no axillary adenopathy demonstrating FDG uptake significantly  above that of blood pool.     The heart is normal in size. Extensive coronary artery calcification is  present.     There is moderate emphysema. No pulmonary consolidation, pleural  effusion or pneumothorax is present. Sub-6 mm pulmonary nodule within  the right upper lobe is stable since 01/30/2020. There are no new  suspicious FDG avid pulmonary nodules.     There are no suspicious FDG avid lesions within the liver, gallbladder,  pancreas, spleen or kidneys. Bilateral adrenal nodules measure 1.6 cm on  the left and 1.3 cm on the right, demonstrate densities less than 10  Hounsfield units, are grossly unchanged since 01/30/2020 and does not  demonstrate FDG uptake significantly above that of the liver, likely  representing adenomas.     Cystic density lesion arising off the left kidney likely represents  simple cyst, as before. There are also left-sided parapelvic cysts. No  hydronephrosis is present. There is no abdominopelvic adenopathy  demonstrating FDG uptake significantly above that of blood pool.     There is colonic diverticulosis. The appendix is  unremarkable.     Previously seen long segment of intense FDG uptake within the distal  rectum and anus has significantly decreased in FDG uptake demonstrating  a max SUV of 3.5 (previously 33.1.). The overall length of uptake has  also decreased. There is no free intraperitoneal air or fluid.     There are not suspicious lytic or blastic FDG avid osseous lesions.  Moderate anterolisthesis of L5 on S1 is present. Findings of pelvic  floor prolapse with an enterocele extending approximately 1 cm below the  pubococcygeal line. The uterus is surgically absent.     IMPRESSION:  1.  Significant decrease in extent and degree of FDG uptake within the  distal rectum and anus as above. No findings of new metastatic disease  in the chest, abdomen or pelvis.  2.  Moderate to intense mediastinal and bilateral hilar adenopathy which  are grossly unchanged since 01/30/2020 and while findings are favored be  reactive, continued attention on follow-up is recommended to ensure  stability.  3.  Findings of bilateral adrenal adenomas, as before.  4.  Other findings as above.     This report was finalized on 6/18/2020 9:34 AM by Dr. Carlos Mackey M.D.     MRI EXAMINATION OF BRAIN WITH AND WITHOUT CONTRAST     HISTORY: Anal cancer, evaluate for metastatic disease.     COMPARISON: No prior MRI is available for comparison.     TECHNIQUE: A MRI examination of the brain was performed utilizing  sagittal T1, axial diffusion, T1, T2, T2 FLAIR, susceptibility weighted  imaging as well as sagittal, axial and coronal T1 postcontrast weighted  sequences.     FINDINGS:  There is no evidence of restricted diffusion, hydrocephalus  or of abnormal extra-axial fluid. There is expected flow-void in the  basilar artery and in the distal aspect of the internal carotid arteries  bilaterally on the axial T2 sequence. A moderate size venous  malformation or a developmental venous anomaly involving the left  frontal lobe posteriorly is appreciated. These  are considered to be  developmental variants. There was no evidence of abnormal enhancement to  suggest metastatic disease.     The axial T2 FLAIR sequence demonstrates confluent increased signal  intensity involving the white matter of the cerebral hemispheres  bilaterally, nonspecific but consistent with moderate small vessel  ischemic disease.     IMPRESSION:  No evidence of metastatic disease. Moderate small vessel  ischemic disease and a left frontal developmental venous anomaly is  appreciated.             Assessment/Plan       80-year-old female followed by primary care as described above with development over the last 4 to 5 months of bright red blood per rectum that became associated with worsening constipation.  The patient symptoms, unfortunately, progressed until she had GI assessment that revealed a mass in the distal rectum/anal rectal region that was cauliflower in description with ulceration.  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  She has been advised that she has a rectal malignancy.                                              In discussing this with the patient and her family we discussed that a primary rectal squamous cell carcinomas is a rare disorder and one wonders whether this is not an anal carcinoma that has extended into the rectum.  They are, incidentally, treated like rectal adenocarcinomas with surgical resection but there are studies that use chemoradiotherapy.      This patient needs to be assessed by colorectal surgery and undergo additional staging endoscopically.  We have contacted Dr. Dara Stack's office and, as they review the record, have requested that the patient proceed with laboratory studies, PET/CT and colorectal surgery assessment.  Additional laboratory studies include a CEA of 3.07, no evidence of iron deficiency, CMP with total protein mildly elevated at 8.3 g/dL.  This did occur and the the patient's studies suggest T2 N0  M0-stage IIA disease.      After review by colorectal surgery the patient is felt best treated by chemoradiation therapy and she is seen with her  February 4, 2020 at which time we discussed how this can be accomplished.  The patient proceeded to laboratory for DPD testing which is currently pending as she is seen February 12, 2020.  We have obtained Xeloda at a 25 mg meter squared to 1500 mg p.o. twice daily during days of radiation therapy with mitomycin also planned - 10 mg meter squared to 15 mg dosing anticipated day 1.    Treatment initiated on 2/17/2020 with concurrent chemotherapy and radiation.  Patient received IV mitomycin 15 mg, as well as initiated Xeloda 1500 Mg twice daily on days of radiation    2/24/2020 patient returns for evaluation.  Overall, tolerating treatment fairly well, other than some mild nausea.  .  Zofran has not been beneficial.  Will prescribe Compazine 10 mg every 6 hours as needed for nausea.  We will flush her PICC line today and change her dressing.  Patient will return in 1 week for reevaluation.    3/10/2020 patient returns for evaluation, starting to have significant discomfort in her anorectal region, particularly with bowel movements and sitting.  She does not have pain medication.  She is reluctant to try pain medication, but is willing to try something particularly to help her sleep at night.  We will prescribe tramadol 1 every 6 hours as needed for pain.    3/17/2020 Worsening pain. Tramadol only helping for about 2 hours.  After discussion and review with Dr. Gray, we will prescribe Norco 5/325 1 to 2 tablets every 6 hours as needed for pain.  I have warned her that this can cause issues with constipation.  Also, we will go ahead and remove her PICC line today, as her counts have remained stable.    Patient reassessed March 23, 2020, Norco more effective though she is having urinary spasm.  We discussed completing treatment and arranging for her reassessments  approximately 6 weeks from now including radiologic as well as colorectal subsequent reviews.  The patient continue Xeloda and radiation therapy completing treatment by the end of March 2020.  She treated symptomatically thereafter and assessed periodically as possible during the COVID epidemic.  Review endoscopically in late April demonstrated further improvement and performed most recently Martha 15 also had no evidence of residual tumor endoscopically.     A follow-up PET done June 16 demonstrates a significant decrease in FDG uptake within the distal rectum and anus with no evidence of metastatic disease in the chest abdomen pelvis though there remains moderate to intense mediastinal bilateral hilar adenopathy that is unchanged from January 30 and thought to be reactive.  I have reviewed all these findings with the patient requesting that she undergo a repeat CT scan of the chest abdomen pelvis in approximately 6 months.          Though we had planned the above process the patient had subsequent hospitalization having presented to the emergency room August 7, 2020 due to weakness and a fall.  We did go on to reassess her via repeat CT of imaging and pelvis and also found leukocytosis with neutrophilia as well as thrombocytosis assessing peripheral blood flow cytometry, JAK2 and FISH for BCR ABL.     Imaging showed evolution of ill-defined soft tissue thickening and stranding within the presacral soft tissues, thickening of the pelvic floor thought to be symmetric possible posttreatment related, small fat-containing right inguinal hernia and asymmetric enlargement left adnexa unchanged.  Chest x-ray revealed moderate interstitial prominence thought to be chronic.  The patient's molecular testing was all negative, flow cytometry negative, iron studies and ferritin consistent with anemia chronic disease.      It was recognized the patient had significant weight loss since completing her chemoradiation therapy by  March 2020 blood, fortunately there is no evidence of recurrent disease.  Notably her sed rate was elevated at greater than 140 though she was considerably anemic at the time this was drawn down to 8.6 hemoglobin.  She returns the office August 27, 2020 with her  and evidence that her quality of life has suffered substantially since the completion of radiation therapy.  At that point she began to have further weakness, loss of appetite and weight loss.  A concern remains about the areas seen on her previous PET/CT that had been done in June with moderate mediastinal and bilateral hilar adenopathy that was unchanged from January with findings favored to be reactive.  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  There is no focal increase at this site.  She has stable sub-6 mm pulmonary nodules present with no avidity.     She has improved significantly on low-dose steroids and we have discussed that underlying inflammatory condition may still be present suppressed by her steroid use.  Patient's follow-up testing did confirm an elevated rheumatoid factor but other studies negative while continue to have an elevated sed rate.  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  As the patient seen back November 12, 2020 she continues to thrive.  We discussed a relatively slow taper over 2 months of her prednisone.     The patient is seen back after she tapered steroids, review January 20, 2021.  There is no evidence of recurrence of her malignancy that was not concerned about her mental status including memory dysfunction, balance issues, chronic headache and worsening  intention tremor.  Her neurologic exam is consistent for a degree of ataxia.     The patient went on to have a follow-up MRI of the brain without significant abnormality and she is contacted by telephone for 11/2021.  She indicates that she is doing fairly well but still has some issues cognitively recognized in talking with her.  Your medication list has led down to the discontinuance of pravastatin occasionally associated with cognitive dysfunction.      Plan:  *Again discontinued pravastatin at present,    *3-month follow-up MD assessment, lipid profile, CBC, potential neurologic assessment for dementia.        You have chosen to receive care through a telephone visit today. Do you consent to use a telephone visit for your medical care today? Yes     This visit has been rescheduled as a phone visit to comply with patient safety concerns in accordance with CDC recommendations. Total time of discussion was 14 minutes.    24095 is 5-10 minutes  27387 is 11-20 minutes  34915 is 21 or more minutes

## 2021-02-05 LAB — CREAT BLDA-MCNC: 1.8 MG/DL (ref 0.6–1.3)

## 2021-02-10 ENCOUNTER — TELEPHONE (OUTPATIENT)
Dept: ONCOLOGY | Facility: CLINIC | Age: 81
End: 2021-02-10

## 2021-02-11 ENCOUNTER — OFFICE VISIT (OUTPATIENT)
Dept: ONCOLOGY | Facility: CLINIC | Age: 81
End: 2021-02-11

## 2021-02-11 ENCOUNTER — APPOINTMENT (OUTPATIENT)
Dept: LAB | Facility: HOSPITAL | Age: 81
End: 2021-02-11

## 2021-02-11 DIAGNOSIS — C21.0 ANAL CANCER (HCC): Primary | ICD-10-CM

## 2021-02-11 PROCEDURE — 99442 PR PHYS/QHP TELEPHONE EVALUATION 11-20 MIN: CPT | Performed by: INTERNAL MEDICINE

## 2021-03-18 ENCOUNTER — HOSPITAL ENCOUNTER (INPATIENT)
Facility: HOSPITAL | Age: 81
LOS: 2 days | Discharge: HOME | End: 2021-03-20
Attending: EMERGENCY MEDICINE | Admitting: INTERNAL MEDICINE

## 2021-03-18 ENCOUNTER — APPOINTMENT (OUTPATIENT)
Dept: GENERAL RADIOLOGY | Facility: HOSPITAL | Age: 81
End: 2021-03-18

## 2021-03-18 ENCOUNTER — APPOINTMENT (OUTPATIENT)
Dept: CT IMAGING | Facility: HOSPITAL | Age: 81
End: 2021-03-18

## 2021-03-18 ENCOUNTER — TELEPHONE (OUTPATIENT)
Dept: ONCOLOGY | Facility: CLINIC | Age: 81
End: 2021-03-18

## 2021-03-18 DIAGNOSIS — I21.19 INFERIOR MI (HCC): Primary | ICD-10-CM

## 2021-03-18 DIAGNOSIS — R41.0 CONFUSION AND DISORIENTATION: ICD-10-CM

## 2021-03-18 DIAGNOSIS — R41.0 CONFUSION: ICD-10-CM

## 2021-03-18 LAB
ALBUMIN SERPL-MCNC: 3.8 G/DL (ref 3.5–5.2)
ALBUMIN/GLOB SERPL: 0.9 G/DL
ALP SERPL-CCNC: 130 U/L (ref 39–117)
ALT SERPL W P-5'-P-CCNC: 17 U/L (ref 1–33)
ANION GAP SERPL CALCULATED.3IONS-SCNC: 12.9 MMOL/L (ref 5–15)
ARTERIAL PATENCY WRIST A: POSITIVE
AST SERPL-CCNC: 58 U/L (ref 1–32)
ATMOSPHERIC PRESS: 742.9 MMHG
B PARAPERT DNA SPEC QL NAA+PROBE: NOT DETECTED
B PERT DNA SPEC QL NAA+PROBE: NOT DETECTED
BACTERIA UR QL AUTO: ABNORMAL /HPF
BASE EXCESS BLDA CALC-SCNC: -3.1 MMOL/L (ref 0–2)
BASOPHILS # BLD AUTO: 0.03 10*3/MM3 (ref 0–0.2)
BASOPHILS NFR BLD AUTO: 0.2 % (ref 0–1.5)
BDY SITE: ABNORMAL
BILIRUB SERPL-MCNC: 0.5 MG/DL (ref 0–1.2)
BILIRUB UR QL STRIP: NEGATIVE
BUN SERPL-MCNC: 25 MG/DL (ref 8–23)
BUN/CREAT SERPL: 16.2 (ref 7–25)
C PNEUM DNA NPH QL NAA+NON-PROBE: NOT DETECTED
CALCIUM SPEC-SCNC: 9.2 MG/DL (ref 8.6–10.5)
CHLORIDE SERPL-SCNC: 102 MMOL/L (ref 98–107)
CLARITY UR: ABNORMAL
CO2 SERPL-SCNC: 22.1 MMOL/L (ref 22–29)
COLOR UR: ABNORMAL
CREAT SERPL-MCNC: 1.54 MG/DL (ref 0.57–1)
CRP SERPL-MCNC: 10.78 MG/DL (ref 0–0.5)
D-LACTATE SERPL-SCNC: 1.1 MMOL/L (ref 0.5–2)
DEPRECATED RDW RBC AUTO: 44.1 FL (ref 37–54)
EOSINOPHIL # BLD AUTO: 0 10*3/MM3 (ref 0–0.4)
EOSINOPHIL NFR BLD AUTO: 0 % (ref 0.3–6.2)
ERYTHROCYTE [DISTWIDTH] IN BLOOD BY AUTOMATED COUNT: 12.3 % (ref 12.3–15.4)
FLUAV SUBTYP SPEC NAA+PROBE: NOT DETECTED
FLUBV RNA ISLT QL NAA+PROBE: NOT DETECTED
GAS FLOW AIRWAY: 3 LPM
GFR SERPL CREATININE-BSD FRML MDRD: 33 ML/MIN/1.73
GLOBULIN UR ELPH-MCNC: 4.3 GM/DL
GLUCOSE BLDC GLUCOMTR-MCNC: 118 MG/DL (ref 70–130)
GLUCOSE BLDC GLUCOMTR-MCNC: 131 MG/DL (ref 70–130)
GLUCOSE BLDC GLUCOMTR-MCNC: 131 MG/DL (ref 70–130)
GLUCOSE SERPL-MCNC: 129 MG/DL (ref 65–99)
GLUCOSE UR STRIP-MCNC: NEGATIVE MG/DL
HADV DNA SPEC NAA+PROBE: NOT DETECTED
HCO3 BLDA-SCNC: 20 MMOL/L (ref 22–28)
HCOV 229E RNA SPEC QL NAA+PROBE: NOT DETECTED
HCOV HKU1 RNA SPEC QL NAA+PROBE: NOT DETECTED
HCOV NL63 RNA SPEC QL NAA+PROBE: NOT DETECTED
HCOV OC43 RNA SPEC QL NAA+PROBE: NOT DETECTED
HCT VFR BLD AUTO: 34.6 % (ref 34–46.6)
HGB BLD-MCNC: 11.6 G/DL (ref 12–15.9)
HGB UR QL STRIP.AUTO: ABNORMAL
HMPV RNA NPH QL NAA+NON-PROBE: NOT DETECTED
HOLD SPECIMEN: NORMAL
HOLD SPECIMEN: NORMAL
HPIV1 RNA SPEC QL NAA+PROBE: NOT DETECTED
HPIV2 RNA SPEC QL NAA+PROBE: NOT DETECTED
HPIV3 RNA NPH QL NAA+PROBE: NOT DETECTED
HPIV4 P GENE NPH QL NAA+PROBE: NOT DETECTED
HYALINE CASTS UR QL AUTO: ABNORMAL /LPF
IMM GRANULOCYTES # BLD AUTO: 0.05 10*3/MM3 (ref 0–0.05)
IMM GRANULOCYTES NFR BLD AUTO: 0.4 % (ref 0–0.5)
KETONES UR QL STRIP: ABNORMAL
LEUKOCYTE ESTERASE UR QL STRIP.AUTO: ABNORMAL
LYMPHOCYTES # BLD AUTO: 1.16 10*3/MM3 (ref 0.7–3.1)
LYMPHOCYTES NFR BLD AUTO: 8.3 % (ref 19.6–45.3)
M PNEUMO IGG SER IA-ACNC: NOT DETECTED
MCH RBC QN AUTO: 32.8 PG (ref 26.6–33)
MCHC RBC AUTO-ENTMCNC: 33.5 G/DL (ref 31.5–35.7)
MCV RBC AUTO: 97.7 FL (ref 79–97)
MODALITY: ABNORMAL
MONOCYTES # BLD AUTO: 0.99 10*3/MM3 (ref 0.1–0.9)
MONOCYTES NFR BLD AUTO: 7 % (ref 5–12)
NEUTROPHILS NFR BLD AUTO: 11.82 10*3/MM3 (ref 1.7–7)
NEUTROPHILS NFR BLD AUTO: 84.1 % (ref 42.7–76)
NITRITE UR QL STRIP: NEGATIVE
NRBC BLD AUTO-RTO: 0 /100 WBC (ref 0–0.2)
PCO2 BLDA: 28.9 MM HG (ref 35–45)
PH BLDA: 7.45 PH UNITS (ref 7.35–7.45)
PH UR STRIP.AUTO: <=5 [PH] (ref 5–8)
PLATELET # BLD AUTO: 406 10*3/MM3 (ref 140–450)
PMV BLD AUTO: 9.4 FL (ref 6–12)
PO2 BLDA: 84.8 MM HG (ref 80–100)
POTASSIUM SERPL-SCNC: 3.9 MMOL/L (ref 3.5–5.2)
PROCALCITONIN SERPL-MCNC: 0.52 NG/ML (ref 0–0.25)
PROT SERPL-MCNC: 8.1 G/DL (ref 6–8.5)
PROT UR QL STRIP: ABNORMAL
RBC # BLD AUTO: 3.54 10*6/MM3 (ref 3.77–5.28)
RBC # UR: ABNORMAL /HPF
REF LAB TEST METHOD: ABNORMAL
RHINOVIRUS RNA SPEC NAA+PROBE: NOT DETECTED
RSV RNA NPH QL NAA+NON-PROBE: NOT DETECTED
SAO2 % BLDCOA: 97 % (ref 92–99)
SARS-COV-2 RNA NPH QL NAA+NON-PROBE: NOT DETECTED
SODIUM SERPL-SCNC: 137 MMOL/L (ref 136–145)
SP GR UR STRIP: 1.02 (ref 1–1.03)
SQUAMOUS #/AREA URNS HPF: ABNORMAL /HPF
TOTAL RATE: 18 BREATHS/MINUTE
TROPONIN T SERPL-MCNC: 2.8 NG/ML (ref 0–0.03)
UROBILINOGEN UR QL STRIP: ABNORMAL
WBC # BLD AUTO: 14.05 10*3/MM3 (ref 3.4–10.8)
WBC UR QL AUTO: ABNORMAL /HPF
WHOLE BLOOD HOLD SPECIMEN: NORMAL
WHOLE BLOOD HOLD SPECIMEN: NORMAL

## 2021-03-18 PROCEDURE — 0202U NFCT DS 22 TRGT SARS-COV-2: CPT | Performed by: EMERGENCY MEDICINE

## 2021-03-18 PROCEDURE — G0378 HOSPITAL OBSERVATION PER HR: HCPCS

## 2021-03-18 PROCEDURE — 81001 URINALYSIS AUTO W/SCOPE: CPT | Performed by: EMERGENCY MEDICINE

## 2021-03-18 PROCEDURE — 92928 PRQ TCAT PLMT NTRAC ST 1 LES: CPT | Performed by: INTERNAL MEDICINE

## 2021-03-18 PROCEDURE — 25010000002 HEPARIN (PORCINE) PER 1000 UNITS: Performed by: INTERNAL MEDICINE

## 2021-03-18 PROCEDURE — 36600 WITHDRAWAL OF ARTERIAL BLOOD: CPT

## 2021-03-18 PROCEDURE — 86140 C-REACTIVE PROTEIN: CPT | Performed by: EMERGENCY MEDICINE

## 2021-03-18 PROCEDURE — C1887 CATHETER, GUIDING: HCPCS | Performed by: INTERNAL MEDICINE

## 2021-03-18 PROCEDURE — 87040 BLOOD CULTURE FOR BACTERIA: CPT | Performed by: EMERGENCY MEDICINE

## 2021-03-18 PROCEDURE — 0 IOPAMIDOL PER 1 ML: Performed by: INTERNAL MEDICINE

## 2021-03-18 PROCEDURE — 85347 COAGULATION TIME ACTIVATED: CPT

## 2021-03-18 PROCEDURE — 70450 CT HEAD/BRAIN W/O DYE: CPT

## 2021-03-18 PROCEDURE — 84145 PROCALCITONIN (PCT): CPT | Performed by: EMERGENCY MEDICINE

## 2021-03-18 PROCEDURE — C1769 GUIDE WIRE: HCPCS | Performed by: INTERNAL MEDICINE

## 2021-03-18 PROCEDURE — C1894 INTRO/SHEATH, NON-LASER: HCPCS | Performed by: INTERNAL MEDICINE

## 2021-03-18 PROCEDURE — 99153 MOD SED SAME PHYS/QHP EA: CPT | Performed by: INTERNAL MEDICINE

## 2021-03-18 PROCEDURE — 93458 L HRT ARTERY/VENTRICLE ANGIO: CPT | Performed by: INTERNAL MEDICINE

## 2021-03-18 PROCEDURE — 84484 ASSAY OF TROPONIN QUANT: CPT | Performed by: EMERGENCY MEDICINE

## 2021-03-18 PROCEDURE — 85025 COMPLETE CBC W/AUTO DIFF WBC: CPT | Performed by: EMERGENCY MEDICINE

## 2021-03-18 PROCEDURE — 25010000002 MIDAZOLAM PER 1 MG: Performed by: INTERNAL MEDICINE

## 2021-03-18 PROCEDURE — 4A023N7 MEASUREMENT OF CARDIAC SAMPLING AND PRESSURE, LEFT HEART, PERCUTANEOUS APPROACH: ICD-10-PCS | Performed by: INTERNAL MEDICINE

## 2021-03-18 PROCEDURE — 99152 MOD SED SAME PHYS/QHP 5/>YRS: CPT | Performed by: INTERNAL MEDICINE

## 2021-03-18 PROCEDURE — 87086 URINE CULTURE/COLONY COUNT: CPT | Performed by: EMERGENCY MEDICINE

## 2021-03-18 PROCEDURE — 71045 X-RAY EXAM CHEST 1 VIEW: CPT

## 2021-03-18 PROCEDURE — 83605 ASSAY OF LACTIC ACID: CPT | Performed by: EMERGENCY MEDICINE

## 2021-03-18 PROCEDURE — B2111ZZ FLUOROSCOPY OF MULTIPLE CORONARY ARTERIES USING LOW OSMOLAR CONTRAST: ICD-10-PCS | Performed by: INTERNAL MEDICINE

## 2021-03-18 PROCEDURE — 25010000002 HEPARIN (PORCINE) PER 1000 UNITS: Performed by: EMERGENCY MEDICINE

## 2021-03-18 PROCEDURE — 99285 EMERGENCY DEPT VISIT HI MDM: CPT

## 2021-03-18 PROCEDURE — 80053 COMPREHEN METABOLIC PANEL: CPT | Performed by: EMERGENCY MEDICINE

## 2021-03-18 PROCEDURE — 93010 ELECTROCARDIOGRAM REPORT: CPT | Performed by: INTERNAL MEDICINE

## 2021-03-18 PROCEDURE — 82962 GLUCOSE BLOOD TEST: CPT

## 2021-03-18 PROCEDURE — B2151ZZ FLUOROSCOPY OF LEFT HEART USING LOW OSMOLAR CONTRAST: ICD-10-PCS | Performed by: INTERNAL MEDICINE

## 2021-03-18 PROCEDURE — P9612 CATHETERIZE FOR URINE SPEC: HCPCS

## 2021-03-18 PROCEDURE — 82803 BLOOD GASES ANY COMBINATION: CPT

## 2021-03-18 PROCEDURE — 25010000002 FENTANYL CITRATE (PF) 100 MCG/2ML SOLUTION: Performed by: INTERNAL MEDICINE

## 2021-03-18 PROCEDURE — 02JY3ZZ INSPECTION OF GREAT VESSEL, PERCUTANEOUS APPROACH: ICD-10-PCS | Performed by: INTERNAL MEDICINE

## 2021-03-18 PROCEDURE — 93005 ELECTROCARDIOGRAM TRACING: CPT | Performed by: EMERGENCY MEDICINE

## 2021-03-18 PROCEDURE — 99223 1ST HOSP IP/OBS HIGH 75: CPT | Performed by: INTERNAL MEDICINE

## 2021-03-18 RX ORDER — LIDOCAINE HYDROCHLORIDE 20 MG/ML
INJECTION, SOLUTION INFILTRATION; PERINEURAL AS NEEDED
Status: DISCONTINUED | OUTPATIENT
Start: 2021-03-18 | End: 2021-03-18 | Stop reason: HOSPADM

## 2021-03-18 RX ORDER — ACETAMINOPHEN 325 MG/1
650 TABLET ORAL EVERY 4 HOURS PRN
Status: DISCONTINUED | OUTPATIENT
Start: 2021-03-18 | End: 2021-03-20 | Stop reason: HOSPADM

## 2021-03-18 RX ORDER — HEPARIN SODIUM 1000 [USP'U]/ML
INJECTION, SOLUTION INTRAVENOUS; SUBCUTANEOUS AS NEEDED
Status: DISCONTINUED | OUTPATIENT
Start: 2021-03-18 | End: 2021-03-18 | Stop reason: HOSPADM

## 2021-03-18 RX ORDER — ERGOCALCIFEROL (VITAMIN D2) 10 MCG
400 TABLET ORAL DAILY
COMMUNITY

## 2021-03-18 RX ORDER — HEPARIN SODIUM 5000 [USP'U]/ML
4000 INJECTION, SOLUTION INTRAVENOUS; SUBCUTANEOUS ONCE
Status: COMPLETED | OUTPATIENT
Start: 2021-03-18 | End: 2021-03-18

## 2021-03-18 RX ORDER — ASPIRIN 325 MG
325 TABLET ORAL ONCE
Status: COMPLETED | OUTPATIENT
Start: 2021-03-18 | End: 2021-03-18

## 2021-03-18 RX ORDER — SODIUM CHLORIDE 9 MG/ML
1 INJECTION, SOLUTION INTRAVENOUS CONTINUOUS
Status: ACTIVE | OUTPATIENT
Start: 2021-03-18 | End: 2021-03-18

## 2021-03-18 RX ORDER — SODIUM CHLORIDE 9 MG/ML
INJECTION, SOLUTION INTRAVENOUS CONTINUOUS PRN
Status: COMPLETED | OUTPATIENT
Start: 2021-03-18 | End: 2021-03-18

## 2021-03-18 RX ORDER — FENTANYL CITRATE 50 UG/ML
INJECTION, SOLUTION INTRAMUSCULAR; INTRAVENOUS AS NEEDED
Status: DISCONTINUED | OUTPATIENT
Start: 2021-03-18 | End: 2021-03-18 | Stop reason: HOSPADM

## 2021-03-18 RX ORDER — MIDAZOLAM HYDROCHLORIDE 1 MG/ML
INJECTION INTRAMUSCULAR; INTRAVENOUS AS NEEDED
Status: DISCONTINUED | OUTPATIENT
Start: 2021-03-18 | End: 2021-03-18 | Stop reason: HOSPADM

## 2021-03-18 RX ORDER — SODIUM CHLORIDE 0.9 % (FLUSH) 0.9 %
10 SYRINGE (ML) INJECTION AS NEEDED
Status: DISCONTINUED | OUTPATIENT
Start: 2021-03-18 | End: 2021-03-20 | Stop reason: HOSPADM

## 2021-03-18 RX ORDER — CLOPIDOGREL BISULFATE 75 MG/1
75 TABLET ORAL DAILY
Status: DISCONTINUED | OUTPATIENT
Start: 2021-03-19 | End: 2021-03-20 | Stop reason: HOSPADM

## 2021-03-18 RX ORDER — ACETAMINOPHEN 500 MG
1000 TABLET ORAL ONCE
Status: COMPLETED | OUTPATIENT
Start: 2021-03-18 | End: 2021-03-18

## 2021-03-18 RX ADMIN — HEPARIN SODIUM 4000 UNITS: 5000 INJECTION, SOLUTION INTRAVENOUS; SUBCUTANEOUS at 15:23

## 2021-03-18 RX ADMIN — ASPIRIN 325 MG: 325 TABLET ORAL at 15:29

## 2021-03-18 RX ADMIN — SODIUM CHLORIDE 1000 ML: 9 INJECTION, SOLUTION INTRAVENOUS at 13:15

## 2021-03-18 RX ADMIN — ACETAMINOPHEN 1000 MG: 500 TABLET, FILM COATED ORAL at 13:15

## 2021-03-18 NOTE — TELEPHONE ENCOUNTER
"S/W PT.  GRANDDAUGHTER,  KATHIE.  VERY CONCERNED OVER PT. MENTAL  STATUS.  STATES THERE HAS BEEN A DRASTIC CHANGE IN HER COGNITIVE SKILLS IN THE PAST WEEK.  PT. MORE FORGETFUL AS IN NOT REMEMBERING THAT HER BEST FRIEND JUST .   TELLING THE SAME STORIES OVER EVERY 10-15 MINUTES.  DID NOT REALIZE SHE WAS INCONTINENT OF URINE THIS MORNING WHEN SHE WOKE UP.  REPORTS SHE HAS FALLEN ABOUT 10 TIMES IN THE LAST MONTH.  PT. REFUSED TO GO TO THE ER.  DENIES HA'S OR VISUAL CHANGES.   NO N/V.  INCREASE WEAKNESS.  REPORTS THIS MORNING SHE HAS \"FORGOTTEN HOW TO WALK.\"  GRANDDAUGHTER STATES SHE DOES HAVE A HISTORY OF STROKES.  ADVISED HER TO TAKE PT. TO THE ER NOW TO BE EVALUATED.  UNDERSTANDING NOTED.  "

## 2021-03-18 NOTE — ED PROVIDER NOTES
EMERGENCY DEPARTMENT ENCOUNTER  Patient was placed in face mask in first look and the following protective measures were taken unless additional measures were taken and documented below in the ED course. Patient was wearing facemask when I entered the room and throughout our encounter. I wore full protective equipment throughout this patient encounter including a face mask, and gloves. Hand hygiene was performed before donning protective equipment and after removal when leaving the room.    Room Number:    Date of encounter:  3/18/2021  PCP: Rosa Christianson MD   Patient is confused and some the story was from her daughter.    HPI:  Context: Benita Garcia is a 80 y.o. female who presents to the ED c/o chief complaint of altered mental status over the past week.  Patient is usually alert and oriented x4 but has been more forgetful over the past week.  Her daughter states that patient does not recall that her best friend recently .  She is having repetitive questioning.  She was incontinent of urine this morning when she woke up.  Which is new for her.  She reportedly has fallen 10 times over the past month.  Have noted that patient has had increasing weakness over the past week.  Patient denies headache, nausea or vomiting.  She denies changes in her vision but has noticed that both of her legs have felt weak.    MEDICAL HISTORY REVIEW  Reviewed in EPIC    PAST MEDICAL HISTORY  Active Ambulatory Problems     Diagnosis Date Noted   • Rectal mass 2020   • Anal cancer (CMS/[facility]) 2020   • Fall 2020   • Weakness 2020   • LIAM (acute kidney injury) (CMS/[facility]) 2020   • Dehydration 2020   • Metabolic acidosis 2020   • Weight loss 2020   • Hyperlipidemia 2020   • Hypertension 2020   • Leukocytosis 2020   • PMR (polymyalgia rheumatica) (CMS/[facility]) 10/01/2020     Resolved Ambulatory Problems     Diagnosis Date Noted   • No Resolved Ambulatory  Problems     Past Medical History:   Diagnosis Date   • Acute renal failure (CMS/HCC) 08/2020   • Aortic atherosclerosis (CMS/HCC)    • Arthritis    • Boil    • Chronic constipation    • Colon polyps    • Depression    • Elevated liver enzymes 08/2020   • Encounter for screening mammogram for breast cancer    • Exudative senile macular retinal degeneration (CMS/HCC)    • GERD (gastroesophageal reflux disease)    • Hemorrhoids    • History of chemotherapy 2020   • History of radiation therapy 2020   • 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 IMTIAZ SELLERS AT [facility]    • ENDOSCOPY N/A 01/03/2020    DR. IMTIAZ SELLERS AT [facility]   • HYSTERECTOMY N/A 1993       FAMILY HISTORY  Family History   Problem Relation Age of Onset   • Cancer Sister         Lung Cancer   • Lung cancer Sister 61   • Brain cancer Sister    • Hypertension Brother    • Heart disease Brother    • Cancer Sister         Brain Cancer   • Lung cancer Sister 72   • Heart disease Father    • Cancer Sister    • Lung cancer Sister    • Cancer Sister    • Lung cancer Sister        SOCIAL HISTORY  Social History     Socioeconomic History   • Marital status:      Spouse name: Dimitri   • Number of children: 2   • Years of education: High school   • Highest education level: Not on file   Tobacco Use   • Smoking status: Former Smoker     Packs/day: 0.50     Years: 40.00     Pack years: 20.00     Types: Cigarettes     Start date: 10/31/2004   •  Smokeless tobacco: Never Used   Vaping Use   • Vaping Use: Never used   Substance and Sexual Activity   • Alcohol use: Not Currently   • Drug use: Never   • Sexual activity: Yes     Partners: Male     Birth control/protection: Post-menopausal, Surgical     Comment: .       ALLERGIES  Codeine and Lipitor [atorvastatin]    The patient's allergies have been reviewed    REVIEW OF SYSTEMS  All systems reviewed and negative except for those discussed in HPI.     PHYSICAL EXAM  I have reviewed the triage vital signs and nursing notes.  ED Triage Vitals   Temp Heart Rate Resp BP SpO2   03/18/21 1255 03/18/21 1255 03/18/21 1255 03/18/21 1312 03/18/21 1308   99.1 °F (37.3 °C) 101 20 111/76 (!) 88 %      Temp src Heart Rate Source Patient Position BP Location FiO2 (%)   03/18/21 1255 03/18/21 1255 -- -- --   Tympanic Monitor            General: Mild distress.  Patient is alert and oriented x2.  She appears pleasantly confused.  HENT: NCAT, PERRL, Nares patent  Eyes: no scleral icterus, extraocular movements are intact  Neck: trachea midline, no ROM limitations  CV: regular rhythm, regular rate  Respiratory: Mild respiratory distress, rhonchi are noted in the left base.  Abdomen: soft, nondistended, nontender to palpation, no rebound tenderness, no guarding or rigidity  : deferred  Musculoskeletal: no deformity  Neuro: alert but confused, moves all extremities, follows commands  Skin: warm, dry    LAB RESULTS  Recent Results (from the past 24 hour(s))   POC Glucose Once    Collection Time: 03/18/21  1:09 PM    Specimen: Blood   Result Value Ref Range    Glucose 131 (H) 70 - 130 mg/dL   Light Blue Top    Collection Time: 03/18/21  1:11 PM   Result Value Ref Range    Extra Tube hold for add-on    Green Top (Gel)    Collection Time: 03/18/21  1:11 PM   Result Value Ref Range    Extra Tube Hold for add-ons.    Lavender Top    Collection Time: 03/18/21  1:11 PM   Result Value Ref Range    Extra Tube hold for add-on    Gold  Top - SST    Collection Time: 03/18/21  1:11 PM   Result Value Ref Range    Extra Tube Hold for add-ons.    Comprehensive Metabolic Panel    Collection Time: 03/18/21  1:11 PM    Specimen: Arm, Right; Blood   Result Value Ref Range    Glucose 129 (H) 65 - 99 mg/dL    BUN 25 (H) 8 - 23 mg/dL    Creatinine 1.54 (H) 0.57 - 1.00 mg/dL    Sodium 137 136 - 145 mmol/L    Potassium 3.9 3.5 - 5.2 mmol/L    Chloride 102 98 - 107 mmol/L    CO2 22.1 22.0 - 29.0 mmol/L    Calcium 9.2 8.6 - 10.5 mg/dL    Total Protein 8.1 6.0 - 8.5 g/dL    Albumin 3.80 3.50 - 5.20 g/dL    ALT (SGPT) 17 1 - 33 U/L    AST (SGOT) 58 (H) 1 - 32 U/L    Alkaline Phosphatase 130 (H) 39 - 117 U/L    Total Bilirubin 0.5 0.0 - 1.2 mg/dL    eGFR Non African Amer 33 (L) >60 mL/min/1.73    Globulin 4.3 gm/dL    A/G Ratio 0.9 g/dL    BUN/Creatinine Ratio 16.2 7.0 - 25.0    Anion Gap 12.9 5.0 - 15.0 mmol/L   Procalcitonin    Collection Time: 03/18/21  1:11 PM    Specimen: Arm, Right; Blood   Result Value Ref Range    Procalcitonin 0.52 (H) 0.00 - 0.25 ng/mL   C-reactive Protein    Collection Time: 03/18/21  1:11 PM    Specimen: Arm, Right; Blood   Result Value Ref Range    C-Reactive Protein 10.78 (H) 0.00 - 0.50 mg/dL   CBC Auto Differential    Collection Time: 03/18/21  1:11 PM    Specimen: Arm, Right; Blood   Result Value Ref Range    WBC 14.05 (H) 3.40 - 10.80 10*3/mm3    RBC 3.54 (L) 3.77 - 5.28 10*6/mm3    Hemoglobin 11.6 (L) 12.0 - 15.9 g/dL    Hematocrit 34.6 34.0 - 46.6 %    MCV 97.7 (H) 79.0 - 97.0 fL    MCH 32.8 26.6 - 33.0 pg    MCHC 33.5 31.5 - 35.7 g/dL    RDW 12.3 12.3 - 15.4 %    RDW-SD 44.1 37.0 - 54.0 fl    MPV 9.4 6.0 - 12.0 fL    Platelets 406 140 - 450 10*3/mm3    Neutrophil % 84.1 (H) 42.7 - 76.0 %    Lymphocyte % 8.3 (L) 19.6 - 45.3 %    Monocyte % 7.0 5.0 - 12.0 %    Eosinophil % 0.0 (L) 0.3 - 6.2 %    Basophil % 0.2 0.0 - 1.5 %    Immature Grans % 0.4 0.0 - 0.5 %    Neutrophils, Absolute 11.82 (H) 1.70 - 7.00 10*3/mm3    Lymphocytes,  Absolute 1.16 0.70 - 3.10 10*3/mm3    Monocytes, Absolute 0.99 (H) 0.10 - 0.90 10*3/mm3    Eosinophils, Absolute 0.00 0.00 - 0.40 10*3/mm3    Basophils, Absolute 0.03 0.00 - 0.20 10*3/mm3    Immature Grans, Absolute 0.05 0.00 - 0.05 10*3/mm3    nRBC 0.0 0.0 - 0.2 /100 WBC   Troponin    Collection Time: 03/18/21  1:11 PM    Specimen: Arm, Right; Blood   Result Value Ref Range    Troponin T 2.800 (C) 0.000 - 0.030 ng/mL   ECG 12 Lead    Collection Time: 03/18/21  1:28 PM   Result Value Ref Range    QT Interval 331 ms   Blood Gas, Arterial -    Collection Time: 03/18/21  1:46 PM    Specimen: Arterial Blood   Result Value Ref Range    Site Arterial: left radial     Sreekanth's Test Positive     pH, Arterial 7.448 7.350 - 7.450 pH units    pCO2, Arterial 28.9 (L) 35.0 - 45.0 mm Hg    pO2, Arterial 84.8 80.0 - 100.0 mm Hg    HCO3, Arterial 20.0 (L) 22.0 - 28.0 mmol/L    Base Excess, Arterial -3.1 (L) 0.0 - 2.0 mmol/L    O2 Saturation Calculated 97.0 92.0 - 99.0 %    Barometric Pressure for Blood Gas 742.9 mmHg    Modality Cannula     Flow Rate 3 lpm    Rate 18 Breaths/minute   Lactic Acid, Plasma    Collection Time: 03/18/21  2:26 PM    Specimen: Arm, Left; Blood   Result Value Ref Range    Lactate 1.1 0.5 - 2.0 mmol/L   Urinalysis With Culture If Indicated - Urine, Catheter    Collection Time: 03/18/21  2:58 PM    Specimen: Urine, Catheter   Result Value Ref Range    Color, UA Dark Yellow (A) Yellow, Straw    Appearance, UA Cloudy (A) Clear    pH, UA <=5.0 5.0 - 8.0    Specific Gravity, UA 1.025 1.005 - 1.030    Glucose, UA Negative Negative    Ketones, UA Trace (A) Negative    Bilirubin, UA Negative Negative    Blood, UA Trace (A) Negative    Protein, UA >=300 mg/dL (3+) (A) Negative    Leuk Esterase, UA Trace (A) Negative    Nitrite, UA Negative Negative    Urobilinogen, UA 1.0 E.U./dL 0.2 - 1.0 E.U./dL   Urinalysis, Microscopic Only - Urine, Catheter    Collection Time: 03/18/21  2:58 PM    Specimen: Urine, Catheter    Result Value Ref Range    RBC, UA 3-5 (A) None Seen, 0-2 /HPF    WBC, UA 6-12 (A) None Seen, 0-2 /HPF    Bacteria, UA None Seen None Seen /HPF    Squamous Epithelial Cells, UA 0-2 None Seen, 0-2 /HPF    Hyaline Casts, UA 13-20 None Seen /LPF    Methodology Manual Light Microscopy        I ordered the above labs and reviewed the results.    RADIOLOGY  CT Head Without Contrast    Result Date: 3/18/2021  CT OF THE BRAIN WITHOUT CONTRAST 03/18/2021  HISTORY: Altered mental status.  FINDINGS: Axial images were obtained through the brain without intravenous contrast. There is moderate diffuse atrophy. There is mild decreased attenuation of the periventricular white matter bilaterally consistent with small vessel white matter ischemic disease.  There is no evidence of acute infarction, hemorrhage, midline shift or mass effect.  No bony abnormalities are seen.      No acute intracranial process identified.  Radiation dose reduction techniques were utilized, including automated exposure control and exposure modulation based on body size.       XR Chest AP    Result Date: 3/18/2021  PORTABLE CHEST X-RAY  HISTORY: Altered mental status for one week. Hypertension.  Portable chest x-ray is provided. Correlation: Chest x-ray August 7, 2020.  FINDINGS: The cardiomediastinal silhouette is normal. The lungs are clear. The costophrenic sulci are dry and the bones appear normal. There is no pneumothorax.      Negative.  This report was finalized on 3/18/2021 2:05 PM by Dr. Dionte Kennedy M.D.        I ordered the above noted radiological studies. I reviewed the images and results. I agree with the radiologist interpretation.    PROCEDURES  Critical Care  Performed by: Anthony Patino MD  Authorized by: Anthony Patino MD     Critical care provider statement:     Critical care time (minutes):  45    Critical care time was exclusive of:  Separately billable procedures and treating other patients    Critical care was necessary to  treat or prevent imminent or life-threatening deterioration of the following conditions:  Cardiac failure    Critical care was time spent personally by me on the following activities:  Development of treatment plan with patient or surrogate, discussions with consultants, evaluation of patient's response to treatment, examination of patient, interpretation of cardiac output measurements, obtaining history from patient or surrogate, ordering and review of laboratory studies, ordering and review of radiographic studies, pulse oximetry, re-evaluation of patient's condition and review of old charts      NIHSS: 1300    Baseline  0-->Alert: keenly responsive  1-->Answers one question correctly  0-->Performs both tasks correctly  0=normal  0=No visual loss  0=Normal symmetric movement  0-->No drift: limb holds 90 (or 45) degrees for full 10 secs  0-->No drift: limb holds 90 (or 45) degrees for full 10 secs  1-->Drift: limb holds 90 (or 45) degrees, but drifts down before full 10 seconds: does not hit bed or other support  0-->No drift: limb holds 90 (or 45) degrees for full 10 secs  0=Absent  0=Normal; no sensory loss  0=No aphasia, normal  0=Normal  0=No abnormality    Total score: 2            MEDICATIONS GIVEN IN ER  Medications   sodium chloride 0.9 % flush 10 mL (has no administration in time range)   sodium chloride 0.9 % flush 10 mL (has no administration in time range)   aspirin tablet 325 mg (has no administration in time range)   heparin (porcine) 5000 UNIT/ML injection 4,000 Units (has no administration in time range)   acetaminophen (TYLENOL) tablet 1,000 mg (1,000 mg Oral Given 3/18/21 1315)   sodium chloride 0.9 % bolus 1,000 mL (1,000 mL Intravenous New Bag 3/18/21 1315)       PROGRESS, DATA ANALYSIS, CONSULTS, AND MEDICAL DECISION MAKING  A complete history and physical exam have been performed.  All available laboratory and imaging results have been reviewed by myself prior to disposition.    MDM  After the  initial H&P, I discussed pertinent information from history and physical exam with patient/family.  Discussed differential diagnosis.  Discussed plan for ED evaluation/work-up/treatment.  All questions answered.  Patient/family is agreeable with plan.  ED Course as of Mar 18 1521   Thu Mar 18, 2021   1314 Given patient's history and exam, I suspect patient has a metabolic issue and is not a stroke.  We will obtain a CT of the head.  However we will look for infection as the underlying cause.    [DE]   1331 EKG          EKG time: 1328  Rhythm/Rate: Sinus rhythm, rate 98  P waves and IA: normal  QRS, axis: Q waves inferiorly  ST and T waves: ST elevations inferiorly with reciprocal changes in 1 and aVL    Interpreted Contemporaneously by me, independently viewed  changed compared to prior 08/07/20      [DE]   1333 We called 18 see    [DE]   1333 Team C was called at 1330.  Patient's  states patient did have chest pain this morning but patient currently does not have chest pain.    [DE]   1334 I have discussed the case with Dr. Wheeler.  Since patient is not having chest pain he would like patient admitted but he is not keen to take patient to the Cath Lab at this time.    [DE]   1343 I discussed the case with Dr. Gallo, interventional cardiology.  She has reviewed patient's EKG and is aware the patient is pain-free.  She does not want to take patient immediately to the Cath Lab.  However, patient does need an emergent CT of the head which we have ordered.  She does want to take patient to the Cath Lab later today given patient's concerning EKG.  When asked, patient states she is still pain-free.    [DE]   1344 I have updated patient's .  He states patient did have chest pain when she awoke this morning and was very confused.  This is when she had the episode of urinary incontinence.  This was support that patient may have had an inferior MI when she woke up this morning.    [DE]   1400  Procalcitonin(!): 0.52 [DE]   1400 Creatinine(!): 1.54 [DE]   1400 C-Reactive Protein(!): 10.78 [DE]   1425 Troponin T(!!): 2.800 [DE]   1517 I discussed the case with .  She recommends giving patient aspirin and 5000 units of heparin IV.  She will be going up to the Cath Lab shortly.    [DE]      ED Course User Index  [DE] Anthony Patino MD       AS OF 15:21 EDT VITALS:    BP -    HR - 101  TEMP - 99.1 °F (37.3 °C) (Tympanic)  O2 SATS - (!) 88%    DIAGNOSIS  Final diagnoses:   Inferior MI (CMS/HCC)   Confusion         DISPOSITION  ADMISSION    Discussed treatment plan and reason for admission with pt/family and admitting physician.  Pt/family voiced understanding of the plan for admission for further testing/treatment as needed.           Anthony Patino MD  03/18/21 6559

## 2021-03-18 NOTE — PLAN OF CARE
Goal Outcome Evaluation:  Plan of Care Reviewed With: patient  Progress: no change  Outcome Summary: Admit from ED for STEMI and confusion. Currently in cath lab. No chest pain or shortness of breath. Patient is disoriented to time and situation. Very forgetful.  at bedside, updated.

## 2021-03-18 NOTE — ED NOTES
Pt assisted out of car by staff. Pt and pt  reports pt has had confusion since this waking up this morning. Pt A&Ox4 at baseline but A&&Ox2 on arrival. Pt having difficulty walkiing and moving L leg.Report given to Ezequiel GAINES at bedside.      Patric Sevilla, RN  03/18/21 1428

## 2021-03-18 NOTE — TELEPHONE ENCOUNTER
PT: SAURAV ROLLINS    RELATIONSHIP: GRANDDAUGHTER KATHIE (ON [facility])    KATHIE IS CALLING BECAUSE PT IS HAVING SOME MAJOR MEMORY ISSUES AND CAN'T TELL ANYONE WHAT WAS SAID AT VISIT/PHONE CALL ON 2/11/21.    KATHIE IS WANTING TO KNOW IF THERE IS A REASON FOR HER MEMORY LOSS?  THIS MORNING WHEN PT  WOKE UP SHE FORGOT HOW TO WALK.  HER SHORT TERM & LONG TERM MEMORY IS REALLY BOTH OFF.     KATHIE ISN'T SURE IF PT &  ARE ASKING THE QUESTIONS THAT SHE HAS ASKED THEM TO ASK YOU.    SHE WOULD LIKE FOR SOMEONE TO PLEASE CALL HER BACK.    KATHIE PHONE:  [phone]

## 2021-03-18 NOTE — H&P
Patient Name: Benita Garcia  :1940  80 y.o.    Date of Admission: 3/18/2021  Date of Consultation:  21  Encounter Provider: Page Gallo MD  Place of Service: [facility] CARDIOLOGY  Referring Provider: Page Gallo MD  Patient Care Team:  Rosa Christianson MD as PCP - General (Internal Medicine)  Zen Franco MD as Consulting Physician (Gastroenterology)  Ezequiel Gray MD as Consulting Physician (Hematology and Oncology)  Rosa Christianson MD as Referring Physician (Internal Medicine)  Mariana Beyer MD as Consulting Physician (Radiation Oncology)      Chief complaint:  Abnormal troponin, confusion, chest pain  History of Present Illness:  This is a 80-year-old woman with a history of anal cancer, CKD, memory issues. History from her      Apparently this morning she woke up and complained of chest pain.  She had urinary incontinence for the first time last night as well.  The  said she looked uncomfortable, however then she stopped complaining of it. And denied dyspnea, diaphoresis.   She has been increasingly confused over the last weeks.  She has been very forgetful.  She has been repeating herself to family.  The  states that this has been a fairly rapid decline over the last 6 months.  He notices a temporal relationship to the last chemotherapy that she received.  Apparently she suffered a fall recently that was really bad, last Thursday. Was down for a while before her  found her outside in a ditch  Notably, Dr. Gray her oncologist last note does make a notation of memory loss.  She does not take any medications syndrome.  She not a smoker or drinker.  She lives her .      Past Medical History:   Diagnosis Date   • Acute renal failure (CMS/HCC) 2020   • Anal cancer (CMS/HCC) 2020    SQUAMOUS CELL CARCINOMA, FOLLOWED BY DR. JOVITA BAUTISTA   • Aortic atherosclerosis (CMS/HCC)    • Arthritis     • Boil    • Chronic constipation    • Colon polyps     FOLLOWED BY DR. JOVITA BAUTISTA   • Dehydration 8/7/2020   • Depression    • Elevated liver enzymes 08/2020   • Encounter for screening mammogram for breast cancer     09/2014, 10/2014, 11/2015, 11/2016, 11/2017, 11/2018, 11/2019.   • Exudative senile macular retinal degeneration (CMS/HCC)    • Fall 08/07/2020    ADMITTED TO [facility]   • GERD (gastroesophageal reflux disease)     FOLLOWED BY DR. IMTIAZ SELLERS   • Hemorrhoids    • History of chemotherapy 2020    FOR ANAL CANCER, FOLLOWED BY DR. KASHMIR ARITA   • History of radiation therapy 2020    FOLLOWED BY DR. 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   • Weakness of both legs 09/2020       Past Surgical History:   Procedure Laterality Date   • COLONOSCOPY W/ POLYPECTOMY N/A 01/03/2020    LARGE MASS IN ANORECTAL AREA, BX: SQUAMOUS CELL CARCINOMA, 3 TUBULAR ADENOMA POLYPS IN ASCENDING, 3 TUBULAR ADENOMA POLYPS IN TRANSVERSE, MELANOSIS COLI, DR. IMTIAZ SELLERS AT [facility]    • ENDOSCOPY N/A 01/03/2020    DR. IMTIAZ SELLERS AT [facility]   • HYSTERECTOMY N/A 1993         Prior to Admission medications    Medication Sig Start Date End Date Taking? Authorizing Provider   aspirin 81 MG EC tablet Take 81 mg by mouth Daily.   Yes Daniela Farmer MD   Cranberry 250 MG tablet Take  by mouth.   Yes Daniela Farmer MD   Multiple Vitamins-Minerals (MULTIVITAMIN ADULT) tablet Take  by mouth.   Yes Daniela Farmer MD   Multiple Vitamins-Minerals (PRESERVISION AREDS 2 PO) Take  by mouth 2 (Two) Times a Day.   Yes Daniela Farmer MD   pravastatin (PRAVACHOL) 40 MG tablet 40 mg 2  (Two) Times a Day. 12/9/19  Yes Daniela Farmer MD   vitamin C (ASCORBIC ACID) 500 MG tablet Take 500 mg by mouth Daily.   Yes Daniela Farmer MD   Vitamin D, Cholecalciferol, (CHOLECALCIFEROL) 10 MCG (400 UNIT) tablet Take 400 Units by mouth Daily. Unsure of the exact units   Yes Daniela Farmer MD   vitamin E 400 UNIT capsule Take 400 Units by mouth Daily.   Yes Daniela Farmer MD   pantoprazole (PROTONIX) 40 MG EC tablet  12/2/19   Daniela Farmer MD       Allergies   Allergen Reactions   • Codeine Rash   • Lipitor [Atorvastatin] Rash       Social History     Socioeconomic History   • Marital status:      Spouse name: Dimitri   • Number of children: 2   • Years of education: High school   • Highest education level: Not on file   Tobacco Use   • Smoking status: Former Smoker     Packs/day: 0.50     Years: 40.00     Pack years: 20.00     Types: Cigarettes     Start date: 10/31/2004   • Smokeless tobacco: Never Used   Vaping Use   • Vaping Use: Never used   Substance and Sexual Activity   • Alcohol use: Not Currently   • Drug use: Never   • Sexual activity: Yes     Partners: Male     Birth control/protection: Post-menopausal, Surgical     Comment: .       Family History   Problem Relation Age of Onset   • Cancer Sister         Lung Cancer   • Lung cancer Sister 61   • Brain cancer Sister    • Hypertension Brother    • Heart disease Brother    • Cancer Sister         Brain Cancer   • Lung cancer Sister 72   • Heart disease Father    • Cancer Sister    • Lung cancer Sister    • Cancer Sister    • Lung cancer Sister        REVIEW OF SYSTEMS:   All systems reviewed.  Pertinent positives identified in HPI.  All other systems are negative.      Objective:     Vitals:    03/18/21 1809 03/18/21 1814 03/18/21 1819 03/18/21 1824   BP:       Pulse:       Resp: 16 16 16 16   Temp:       TempSrc:       SpO2:       Weight:       Height:         Body mass index is 23.51 kg/m².    General  Appearance:    Alert, cooperative, pleasantly confused   Head:    Normocephalic, without obvious abnormality, atraumatic   Eyes:            Lids and lashes normal, conjunctivae and sclerae normal, no icterus, no pallor, corneas clear, PERRLA   Ears:    Ears appear intact with no abnormalities noted   Throat:   No oral lesions, no thrush, oral mucosa moist   Neck:   No adenopathy, supple, trachea midline, no thyromegaly, no carotid bruit, no JVD   Back:     No kyphosis present, no scoliosis present, no skin lesions, erythema or scars, no tenderness to percussion or palpation, range of motion normal   Lungs:     Clear to auscultation, respirations regular, even and unlabored    Heart:    Regular rhythm and normal rate, normal S1 and S2, no murmur, no gallop, no rub, no click   Chest Wall:    No abnormalities observed   Abdomen:     Normal bowel sounds, no masses, no organomegaly, soft, nontender, nondistended, no guarding, no rebound  tenderness   Extremities:   Moves all extremities well, no edema, no cyanosis, no redness   Pulses:   Pulses palpable and equal bilaterally. Normal radial, carotid, femoral, dorsalis pedis and posterior tibial pulses bilaterally. Normal abdominal aorta   Skin:  Psychiatric:   No bleeding, bruising or rash    Alert and oriented x 3, normal mood and affect   Lab Review:     Results from last 7 days   Lab Units 03/18/21  1311   SODIUM mmol/L 137   POTASSIUM mmol/L 3.9   CHLORIDE mmol/L 102   CO2 mmol/L 22.1   BUN mg/dL 25*   CREATININE mg/dL 1.54*   CALCIUM mg/dL 9.2   BILIRUBIN mg/dL 0.5   ALK PHOS U/L 130*   ALT (SGPT) U/L 17   AST (SGOT) U/L 58*   GLUCOSE mg/dL 129*     Results from last 7 days   Lab Units 03/18/21  1311   TROPONIN T ng/mL 2.800*     Results from last 7 days   Lab Units 03/18/21  1311   WBC 10*3/mm3 14.05*   HEMOGLOBIN g/dL 11.6*   HEMATOCRIT % 34.6   PLATELETS 10*3/mm3 406                           I personally viewed and interpreted the patient's EKG/Telemetry  data.  Inferior ST elevations leads II and III Q waves noted, normal sinus rhythm      Assessment and Plan:       1.  CAD  2. NSTEMI  3.  Memory loss  4.  Anal cancer in remission  5.  Leukocytosis  6. CKD  7. New incontinence  8. New falls    80 year old woman who presented with vague chest pain, confusion, falls. Her torponin was 2.8 and EKG showing inferior Q and CANDE.   LHC showed heavily calcified 99% subtotal stenosis in a tortuous segment of the proximal RCA with relatively brisk YAMILA II flow distal to the lesion and preserved EF. I tried to cross the lesion, however was unable to. Given the preserved distal flow I chose to treat this lesion medically.   ASA, Plavix, Statin  Will get medicine on board for neuro issues/memory loss, falls, urinary incontinence, including workup for NPH    Page Gallo MD  03/18/21  18:30 EDT

## 2021-03-19 ENCOUNTER — APPOINTMENT (OUTPATIENT)
Dept: CARDIOLOGY | Facility: HOSPITAL | Age: 81
End: 2021-03-19

## 2021-03-19 ENCOUNTER — APPOINTMENT (OUTPATIENT)
Dept: MRI IMAGING | Facility: HOSPITAL | Age: 81
End: 2021-03-19

## 2021-03-19 PROBLEM — N18.9 CKD (CHRONIC KIDNEY DISEASE): Status: ACTIVE | Noted: 2021-03-19

## 2021-03-19 PROBLEM — R09.02 HYPOXIA: Status: ACTIVE | Noted: 2021-03-19

## 2021-03-19 PROBLEM — R41.0 CONFUSION: Status: ACTIVE | Noted: 2021-03-19

## 2021-03-19 PROBLEM — R32 URINARY INCONTINENCE: Status: ACTIVE | Noted: 2021-03-19

## 2021-03-19 PROBLEM — I25.10 CAD (CORONARY ARTERY DISEASE): Status: ACTIVE | Noted: 2021-03-19

## 2021-03-19 LAB
ACT BLD: 186 SECONDS (ref 82–152)
ACT BLD: 230 SECONDS (ref 82–152)
ANION GAP SERPL CALCULATED.3IONS-SCNC: 11.3 MMOL/L (ref 5–15)
AORTIC DIMENSIONLESS INDEX: 0.9 (DI)
BACTERIA SPEC AEROBE CULT: NORMAL
BH CV ECHO MEAS - ACS: 2 CM
BH CV ECHO MEAS - AO MAX PG (FULL): 3.2 MMHG
BH CV ECHO MEAS - AO MAX PG: 7.6 MMHG
BH CV ECHO MEAS - AO MEAN PG (FULL): 1 MMHG
BH CV ECHO MEAS - AO MEAN PG: 4 MMHG
BH CV ECHO MEAS - AO ROOT AREA (BSA CORRECTED): 2.1
BH CV ECHO MEAS - AO ROOT AREA: 8.6 CM^2
BH CV ECHO MEAS - AO ROOT DIAM: 3.3 CM
BH CV ECHO MEAS - AO V2 MAX: 138 CM/SEC
BH CV ECHO MEAS - AO V2 MEAN: 86.1 CM/SEC
BH CV ECHO MEAS - AO V2 VTI: 23.4 CM
BH CV ECHO MEAS - AVA(I,A): 2.6 CM^2
BH CV ECHO MEAS - AVA(I,D): 2.6 CM^2
BH CV ECHO MEAS - AVA(V,A): 2.2 CM^2
BH CV ECHO MEAS - AVA(V,D): 2.2 CM^2
BH CV ECHO MEAS - BSA(HAYCOCK): 1.6 M^2
BH CV ECHO MEAS - BSA: 1.6 M^2
BH CV ECHO MEAS - BZI_BMI: 23.2 KILOGRAMS/M^2
BH CV ECHO MEAS - BZI_METRIC_HEIGHT: 157.5 CM
BH CV ECHO MEAS - BZI_METRIC_WEIGHT: 57.6 KG
BH CV ECHO MEAS - EDV(CUBED): 59.3 ML
BH CV ECHO MEAS - EDV(MOD-SP2): 26 ML
BH CV ECHO MEAS - EDV(MOD-SP4): 26 ML
BH CV ECHO MEAS - EDV(TEICH): 65.9 ML
BH CV ECHO MEAS - EF(CUBED): 63 %
BH CV ECHO MEAS - EF(MOD-SP2): 53.8 %
BH CV ECHO MEAS - EF(MOD-SP4): 57.7 %
BH CV ECHO MEAS - EF(TEICH): 55.2 %
BH CV ECHO MEAS - ESV(CUBED): 22 ML
BH CV ECHO MEAS - ESV(MOD-SP2): 12 ML
BH CV ECHO MEAS - ESV(MOD-SP4): 11 ML
BH CV ECHO MEAS - ESV(TEICH): 29.6 ML
BH CV ECHO MEAS - FS: 28.2 %
BH CV ECHO MEAS - IVS/LVPW: 1
BH CV ECHO MEAS - IVSD: 1.1 CM
BH CV ECHO MEAS - LAT PEAK E' VEL: 7 CM/SEC
BH CV ECHO MEAS - LV DIASTOLIC VOL/BSA (35-75): 16.5 ML/M^2
BH CV ECHO MEAS - LV MASS(C)D: 140.1 GRAMS
BH CV ECHO MEAS - LV MASS(C)DI: 88.9 GRAMS/M^2
BH CV ECHO MEAS - LV MAX PG: 4.4 MMHG
BH CV ECHO MEAS - LV MEAN PG: 3 MMHG
BH CV ECHO MEAS - LV SYSTOLIC VOL/BSA (12-30): 7 ML/M^2
BH CV ECHO MEAS - LV V1 MAX: 105 CM/SEC
BH CV ECHO MEAS - LV V1 MEAN: 76.8 CM/SEC
BH CV ECHO MEAS - LV V1 VTI: 21.1 CM
BH CV ECHO MEAS - LVIDD: 3.9 CM
BH CV ECHO MEAS - LVIDS: 2.8 CM
BH CV ECHO MEAS - LVLD AP2: 5.3 CM
BH CV ECHO MEAS - LVLD AP4: 5.8 CM
BH CV ECHO MEAS - LVLS AP2: 4.3 CM
BH CV ECHO MEAS - LVLS AP4: 5.6 CM
BH CV ECHO MEAS - LVOT AREA (M): 2.8 CM^2
BH CV ECHO MEAS - LVOT AREA: 2.8 CM^2
BH CV ECHO MEAS - LVOT DIAM: 1.9 CM
BH CV ECHO MEAS - LVPWD: 1.1 CM
BH CV ECHO MEAS - MED PEAK E' VEL: 4.9 CM/SEC
BH CV ECHO MEAS - MV A DUR: 0.12 SEC
BH CV ECHO MEAS - MV A MAX VEL: 108 CM/SEC
BH CV ECHO MEAS - MV DEC SLOPE: 325.7 CM/SEC^2
BH CV ECHO MEAS - MV DEC TIME: 203 SEC
BH CV ECHO MEAS - MV E MAX VEL: 57.4 CM/SEC
BH CV ECHO MEAS - MV E/A: 0.53
BH CV ECHO MEAS - MV MAX PG: 7.7 MMHG
BH CV ECHO MEAS - MV MEAN PG: 3 MMHG
BH CV ECHO MEAS - MV P1/2T MAX VEL: 95.3 CM/SEC
BH CV ECHO MEAS - MV P1/2T: 85.7 MSEC
BH CV ECHO MEAS - MV V2 MAX: 139 CM/SEC
BH CV ECHO MEAS - MV V2 MEAN: 75.1 CM/SEC
BH CV ECHO MEAS - MV V2 VTI: 24 CM
BH CV ECHO MEAS - MVA P1/2T LCG: 2.3 CM^2
BH CV ECHO MEAS - MVA(P1/2T): 2.6 CM^2
BH CV ECHO MEAS - MVA(VTI): 2.5 CM^2
BH CV ECHO MEAS - PA ACC TIME: 0.07 SEC
BH CV ECHO MEAS - PA MAX PG (FULL): 0.66 MMHG
BH CV ECHO MEAS - PA MAX PG: 2.5 MMHG
BH CV ECHO MEAS - PA PR(ACCEL): 47.1 MMHG
BH CV ECHO MEAS - PA V2 MAX: 78.5 CM/SEC
BH CV ECHO MEAS - PULM A REVS DUR: 0.11 SEC
BH CV ECHO MEAS - PULM A REVS VEL: 31.3 CM/SEC
BH CV ECHO MEAS - PULM DIAS VEL: 30.4 CM/SEC
BH CV ECHO MEAS - PULM S/D: 1.4
BH CV ECHO MEAS - PULM SYS VEL: 42.8 CM/SEC
BH CV ECHO MEAS - PVA(V,A): 2.4 CM^2
BH CV ECHO MEAS - PVA(V,D): 2.4 CM^2
BH CV ECHO MEAS - QP/QS: 0.57
BH CV ECHO MEAS - RAP SYSTOLE: 3 MMHG
BH CV ECHO MEAS - RV MAX PG: 1.8 MMHG
BH CV ECHO MEAS - RV MEAN PG: 1 MMHG
BH CV ECHO MEAS - RV V1 MAX: 67.2 CM/SEC
BH CV ECHO MEAS - RV V1 MEAN: 41.2 CM/SEC
BH CV ECHO MEAS - RV V1 VTI: 12 CM
BH CV ECHO MEAS - RVOT AREA: 2.8 CM^2
BH CV ECHO MEAS - RVOT DIAM: 1.9 CM
BH CV ECHO MEAS - RVSP: 24 MMHG
BH CV ECHO MEAS - SI(AO): 127 ML/M^2
BH CV ECHO MEAS - SI(CUBED): 23.7 ML/M^2
BH CV ECHO MEAS - SI(LVOT): 38 ML/M^2
BH CV ECHO MEAS - SI(MOD-SP2): 8.9 ML/M^2
BH CV ECHO MEAS - SI(MOD-SP4): 9.5 ML/M^2
BH CV ECHO MEAS - SI(TEICH): 23.1 ML/M^2
BH CV ECHO MEAS - SV(AO): 200.1 ML
BH CV ECHO MEAS - SV(CUBED): 37.4 ML
BH CV ECHO MEAS - SV(LVOT): 59.8 ML
BH CV ECHO MEAS - SV(MOD-SP2): 14 ML
BH CV ECHO MEAS - SV(MOD-SP4): 15 ML
BH CV ECHO MEAS - SV(RVOT): 34 ML
BH CV ECHO MEAS - SV(TEICH): 36.4 ML
BH CV ECHO MEAS - TAPSE (>1.6): 1.6 CM
BH CV ECHO MEAS - TR MAX PG: 21 MMHG
BH CV ECHO MEAS - TR MAX VEL: 229 CM/SEC
BH CV ECHO MEASUREMENTS AVERAGE E/E' RATIO: 9.65
BH CV XLRA - RV BASE: 2.6 CM
BH CV XLRA - RV LENGTH: 4.3 CM
BH CV XLRA - RV MID: 2.3 CM
BH CV XLRA - TDI S': 14.9 CM/SEC
BUN SERPL-MCNC: 21 MG/DL (ref 8–23)
BUN/CREAT SERPL: 13.2 (ref 7–25)
CALCIUM SPEC-SCNC: 8.9 MG/DL (ref 8.6–10.5)
CHLORIDE SERPL-SCNC: 105 MMOL/L (ref 98–107)
CHOLEST SERPL-MCNC: 139 MG/DL (ref 0–200)
CO2 SERPL-SCNC: 20.7 MMOL/L (ref 22–29)
CREAT SERPL-MCNC: 1.59 MG/DL (ref 0.57–1)
DEPRECATED RDW RBC AUTO: 40.9 FL (ref 37–54)
ERYTHROCYTE [DISTWIDTH] IN BLOOD BY AUTOMATED COUNT: 11.8 % (ref 12.3–15.4)
GFR SERPL CREATININE-BSD FRML MDRD: 31 ML/MIN/1.73
GLUCOSE BLDC GLUCOMTR-MCNC: 101 MG/DL (ref 70–130)
GLUCOSE SERPL-MCNC: 97 MG/DL (ref 65–99)
HBA1C MFR BLD: 5.7 % (ref 4.8–5.6)
HCT VFR BLD AUTO: 27.8 % (ref 34–46.6)
HDLC SERPL-MCNC: 35 MG/DL (ref 40–60)
HGB BLD-MCNC: 9.3 G/DL (ref 12–15.9)
LDLC SERPL CALC-MCNC: 76 MG/DL (ref 0–100)
LDLC/HDLC SERPL: 2.03 {RATIO}
LEFT ATRIUM VOLUME INDEX: 28.4 ML/M2
MAXIMAL PREDICTED HEART RATE: 142 BPM
MCH RBC QN AUTO: 32 PG (ref 26.6–33)
MCHC RBC AUTO-ENTMCNC: 33.5 G/DL (ref 31.5–35.7)
MCV RBC AUTO: 95.5 FL (ref 79–97)
PLATELET # BLD AUTO: 418 10*3/MM3 (ref 140–450)
PMV BLD AUTO: 9.6 FL (ref 6–12)
POTASSIUM SERPL-SCNC: 3.8 MMOL/L (ref 3.5–5.2)
RBC # BLD AUTO: 2.91 10*6/MM3 (ref 3.77–5.28)
SODIUM SERPL-SCNC: 137 MMOL/L (ref 136–145)
STRESS TARGET HR: 121 BPM
TRIGL SERPL-MCNC: 165 MG/DL (ref 0–150)
VLDLC SERPL-MCNC: 28 MG/DL (ref 5–40)
WBC # BLD AUTO: 9.42 10*3/MM3 (ref 3.4–10.8)

## 2021-03-19 PROCEDURE — 93005 ELECTROCARDIOGRAM TRACING: CPT | Performed by: INTERNAL MEDICINE

## 2021-03-19 PROCEDURE — 70553 MRI BRAIN STEM W/O & W/DYE: CPT

## 2021-03-19 PROCEDURE — 85027 COMPLETE CBC AUTOMATED: CPT | Performed by: INTERNAL MEDICINE

## 2021-03-19 PROCEDURE — 93306 TTE W/DOPPLER COMPLETE: CPT | Performed by: INTERNAL MEDICINE

## 2021-03-19 PROCEDURE — 83036 HEMOGLOBIN GLYCOSYLATED A1C: CPT | Performed by: INTERNAL MEDICINE

## 2021-03-19 PROCEDURE — 93010 ELECTROCARDIOGRAM REPORT: CPT | Performed by: INTERNAL MEDICINE

## 2021-03-19 PROCEDURE — 99223 1ST HOSP IP/OBS HIGH 75: CPT | Performed by: PSYCHIATRY & NEUROLOGY

## 2021-03-19 PROCEDURE — 80048 BASIC METABOLIC PNL TOTAL CA: CPT | Performed by: INTERNAL MEDICINE

## 2021-03-19 PROCEDURE — 0 GADOBENATE DIMEGLUMINE 529 MG/ML SOLUTION: Performed by: INTERNAL MEDICINE

## 2021-03-19 PROCEDURE — 25010000002 CEFTRIAXONE PER 250 MG: Performed by: INTERNAL MEDICINE

## 2021-03-19 PROCEDURE — 97161 PT EVAL LOW COMPLEX 20 MIN: CPT

## 2021-03-19 PROCEDURE — 80061 LIPID PANEL: CPT | Performed by: INTERNAL MEDICINE

## 2021-03-19 PROCEDURE — 93306 TTE W/DOPPLER COMPLETE: CPT

## 2021-03-19 PROCEDURE — 82962 GLUCOSE BLOOD TEST: CPT

## 2021-03-19 PROCEDURE — 99232 SBSQ HOSP IP/OBS MODERATE 35: CPT | Performed by: INTERNAL MEDICINE

## 2021-03-19 PROCEDURE — 97110 THERAPEUTIC EXERCISES: CPT

## 2021-03-19 PROCEDURE — A9577 INJ MULTIHANCE: HCPCS | Performed by: INTERNAL MEDICINE

## 2021-03-19 RX ORDER — ASPIRIN 81 MG/1
81 TABLET ORAL DAILY
Status: DISCONTINUED | OUTPATIENT
Start: 2021-03-19 | End: 2021-03-20 | Stop reason: HOSPADM

## 2021-03-19 RX ORDER — ATORVASTATIN CALCIUM 20 MG/1
40 TABLET, FILM COATED ORAL NIGHTLY
Status: DISCONTINUED | OUTPATIENT
Start: 2021-03-19 | End: 2021-03-20 | Stop reason: HOSPADM

## 2021-03-19 RX ORDER — CEFTRIAXONE SODIUM 1 G/50ML
1 INJECTION, SOLUTION INTRAVENOUS EVERY 24 HOURS
Status: DISCONTINUED | OUTPATIENT
Start: 2021-03-19 | End: 2021-03-19

## 2021-03-19 RX ADMIN — ASPIRIN 81 MG: 81 TABLET, COATED ORAL at 11:57

## 2021-03-19 RX ADMIN — ACETAMINOPHEN 650 MG: 325 TABLET ORAL at 14:49

## 2021-03-19 RX ADMIN — ATORVASTATIN CALCIUM 40 MG: 20 TABLET, FILM COATED ORAL at 20:04

## 2021-03-19 RX ADMIN — METOPROLOL TARTRATE 25 MG: 25 TABLET, FILM COATED ORAL at 11:57

## 2021-03-19 RX ADMIN — CEFTRIAXONE SODIUM 1 G: 1 INJECTION, SOLUTION INTRAVENOUS at 11:57

## 2021-03-19 RX ADMIN — METOPROLOL TARTRATE 25 MG: 25 TABLET, FILM COATED ORAL at 20:04

## 2021-03-19 RX ADMIN — CLOPIDOGREL 75 MG: 75 TABLET, FILM COATED ORAL at 08:32

## 2021-03-19 RX ADMIN — GADOBENATE DIMEGLUMINE 10 ML: 529 INJECTION, SOLUTION INTRAVENOUS at 16:58

## 2021-03-19 NOTE — PLAN OF CARE
Goal Outcome Evaluation:  Plan of Care Reviewed With: patient, spouse     Outcome Summary: pt did well today with mobility, able to walk with supervision with rwx, normal strength and ROM, mild balance deficit, has some L hip pain and use of walker assisted pt with pain and balance deficit, rec pt use her rollator at home, pt will be safe to go home with assist of spouse and may benefit from home health PT as spouse reports recent falls, and he reports that pt doesn't move much at home.  Patient was intermittently wearing a face mask during this therapy encounter. Therapist used appropriate personal protective equipment including eye protection, mask, and gloves.  Mask used was standard procedure mask. Appropriate PPE was worn during the entire therapy session. Hand hygiene was completed before and after therapy session. Patient is not in enhanced droplet precautions.   PT katia Caldwell was present

## 2021-03-19 NOTE — NURSING NOTE
2 silver colored rings and one gold colored ring handed to patient's , Milad Garcia. Rings removed for patient's MRI trip.

## 2021-03-19 NOTE — CONSULTS
Patient Name:  Benita Garcia  YOB: 1940  MRN:  [id number]  Date of Admission:  3/18/2021  Date of Consult:  3/19/2021  Patient Care Team:  Khadra Devine APRN as PCP - General (Nurse Practitioner)  Zen Franco MD as Consulting Physician (Gastroenterology)  Ezequiel Gray MD as Consulting Physician (Hematology and Oncology)  Rosa Christianson MD as Referring Physician (Internal Medicine)  Mariana Beyer MD as Consulting Physician (Radiation Oncology)    Consults  Subjective   History of Present Illness  Ms. Garcia is a 80 y.o. female with a history of CKD, hypertension, hyperlipidemia, anal cancer and recent recurrent falls that has been admitted to [facility] following elective LEFT HEART CATH, CORONARY ANGIOGRAPHY, Left ventriculography.  She has been admitted to CCU following heart cath and we were asked to see and assist with her medical problems, specifically relating to her urinary continence.  At the time of my visit she denies any chest pain, SOA, nausea, vomiting or diarrhea.  She is eating and drinking.  Apparently she had an episode of urinary incontinence prior to having her NSTEMI, since then this seems to have resolved.  She denies any urinary complaints, white count is normal and she has been afebrile.  She was hypoxic on admission and was placed on 4L, she does not typically require oxygen.  She is currently on 2L O2 NC.      Past Medical History:   Diagnosis Date   • Acute renal failure (CMS/HCC) 08/2020   • Anal cancer (CMS/HCC) 01/2020    SQUAMOUS CELL CARCINOMA, FOLLOWED BY DR. JOVITA BAUTISTA   • Aortic atherosclerosis (CMS/HCC)    • Arthritis    • Boil    • Chronic constipation    • Colon polyps     FOLLOWED BY DR. JOVITA BAUTISTA   • Dehydration 8/7/2020   • Depression    • Elevated liver enzymes 08/2020   • Encounter for screening mammogram for breast cancer     09/2014, 10/2014, 11/2015, 11/2016, 11/2017, 11/2018, 11/2019.   •  Exudative senile macular retinal degeneration (CMS/HCC)    • Fall 08/07/2020    ADMITTED TO [facility]   • GERD (gastroesophageal reflux disease)     FOLLOWED BY DR. IMTIAZ SELLERS   • Hemorrhoids    • History of chemotherapy 2020    FOR ANAL CANCER, FOLLOWED BY DR. KASHMIR ARITA   • History of radiation therapy 2020    FOLLOWED BY DR. 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   • Weakness of both legs 09/2020     Past Surgical History:   Procedure Laterality Date   • CARDIAC CATHETERIZATION N/A 3/18/2021    Procedure: LEFT HEART CATH;  Surgeon: Page Gallo MD;  Location:  [facility];  Service: Cardiology;  Laterality: N/A;   • CARDIAC CATHETERIZATION N/A 3/18/2021    Procedure: CORONARY ANGIOGRAPHY;  Surgeon: Page Gallo MD;  Location:  [facility];  Service: Cardiology;  Laterality: N/A;   • CARDIAC CATHETERIZATION N/A 3/18/2021    Procedure: Left ventriculography;  Surgeon: Page Gallo MD;  Location:  [facility];  Service: Cardiology;  Laterality: N/A;   • COLONOSCOPY W/ POLYPECTOMY N/A 01/03/2020    LARGE MASS IN ANORECTAL AREA, BX: SQUAMOUS CELL CARCINOMA, 3 TUBULAR ADENOMA POLYPS IN ASCENDING, 3 TUBULAR ADENOMA POLYPS IN TRANSVERSE, MELANOSIS COLI, DR. IMTIAZ SELLERS AT [facility]    • ENDOSCOPY N/A 01/03/2020    DR. IMTIAZ SELLERS AT [facility]   • HYSTERECTOMY N/A 1993     Family History   Problem Relation Age of Onset   • Cancer Sister         Lung Cancer   • Lung cancer Sister 61   • Brain cancer Sister    • Hypertension Brother    • Heart disease Brother    • Cancer Sister         Brain Cancer   • Lung cancer Sister 72   •  Heart disease Father    • Cancer Sister    • Lung cancer Sister    • Cancer Sister    • Lung cancer Sister      Social History     Tobacco Use   • Smoking status: Former Smoker     Packs/day: 0.50     Years: 40.00     Pack years: 20.00     Types: Cigarettes     Start date: 10/31/2004   • Smokeless tobacco: Never Used   Vaping Use   • Vaping Use: Never used   Substance Use Topics   • Alcohol use: Not Currently   • Drug use: Never     Medications Prior to Admission   Medication Sig Dispense Refill Last Dose   • aspirin 81 MG EC tablet Take 81 mg by mouth Daily.   3/18/2021 at Unknown time   • Cranberry 250 MG tablet Take  by mouth.   3/18/2021 at Unknown time   • Multiple Vitamins-Minerals (MULTIVITAMIN ADULT) tablet Take  by mouth.   3/18/2021 at Unknown time   • Multiple Vitamins-Minerals (PRESERVISION AREDS 2 PO) Take  by mouth 2 (Two) Times a Day.   3/18/2021 at Unknown time   • pravastatin (PRAVACHOL) 40 MG tablet 40 mg 2 (Two) Times a Day.   3/18/2021 at Unknown time   • vitamin C (ASCORBIC ACID) 500 MG tablet Take 500 mg by mouth Daily.   3/18/2021 at Unknown time   • Vitamin D, Cholecalciferol, (CHOLECALCIFEROL) 10 MCG (400 UNIT) tablet Take 400 Units by mouth Daily. Unsure of the exact units      • vitamin E 400 UNIT capsule Take 400 Units by mouth Daily.   3/18/2021 at Unknown time   • pantoprazole (PROTONIX) 40 MG EC tablet    Unknown at Unknown time     Allergies:  Codeine and Lipitor [atorvastatin]    Review of Systems   Constitutional: Negative for chills and fever.   HENT: Negative for congestion and sore throat.    Eyes: Negative for discharge and itching.   Respiratory: Negative for chest tightness and shortness of breath.    Cardiovascular: Negative for chest pain and palpitations.   Gastrointestinal: Negative for abdominal pain, nausea and vomiting.   Endocrine: Negative for cold intolerance and heat intolerance.   Genitourinary: Negative for difficulty urinating, dysuria, flank pain, hematuria and  urgency.   Musculoskeletal: Positive for gait problem. Negative for neck pain.   Skin: Negative for color change and pallor.   Allergic/Immunologic: Negative for environmental allergies and food allergies.   Neurological: Negative for dizziness and headaches.   Hematological: Negative for adenopathy. Does not bruise/bleed easily.   Psychiatric/Behavioral: Positive for confusion. Negative for agitation and behavioral problems.       Objective      Vital Signs  Temp:  [98.1 °F (36.7 °C)-99.1 °F (37.3 °C)] 98.3 °F (36.8 °C)  Heart Rate:  [] 78  Resp:  [16-20] 16  BP: ()/(37-84) 114/65  Body mass index is 23.39 kg/m².    Physical Exam  Vitals and nursing note reviewed.   Constitutional:       Appearance: She is ill-appearing (chronically).      Comments: elderly, frail   HENT:      Head: Normocephalic and atraumatic.   Eyes:      Extraocular Movements: Extraocular movements intact.      Conjunctiva/sclera: Conjunctivae normal.   Cardiovascular:      Rate and Rhythm: Normal rate and regular rhythm.   Pulmonary:      Effort: Pulmonary effort is normal. No respiratory distress.      Breath sounds: Normal breath sounds.   Abdominal:      General: Bowel sounds are normal. There is no distension.      Palpations: Abdomen is soft.      Tenderness: There is no abdominal tenderness.   Musculoskeletal:      Cervical back: Normal range of motion and neck supple.   Neurological:      Mental Status: She is alert.         Results Review:   I reviewed the patient's new clinical results.  I reviewed the patient's new imaging results and agree with the interpretation.  I reviewed the patient's other test results and agree with the interpretation  I personally viewed and interpreted the patient's EKG/Telemetry data         Assessment/Plan     Active Hospital Problems    Diagnosis  POA   • **Inferior MI (CMS/HCC) [I21.19]  Yes   • CAD (coronary artery disease) [I25.10]  Yes   • CKD (chronic kidney disease) [N18.9]  Yes   •  Urinary incontinence [R32]  Yes   • Confusion [R41.0]  Yes   • Hypoxia [R09.02]  Unknown   • Hypertension [I10]  Yes   • Hyperlipidemia [E78.5]  Yes   • Falls [W19.XXXA]  Yes   • Anal cancer (CMS/HCC) [C21.0]  Yes     SQUAMOUS CELL CARCINOMA, FOLLOWED BY DR. JOVITA BAUTISTA         Ms. Garcia is a 80 y.o. female who is s/p LEFT HEART CATH, CORONARY ANGIOGRAPHY, Left ventriculography.    · urinary incontinence: patient is actually denying any urinary complaints at this time, including incontinence. Spoke with nurse who states she is now aware when she needs to void. Has had a purewick on and is forgetful it's there. I would recommend removing the purewick if possible when more steady on her feet. UA looks okay but given presence of blood, will go ahead and treat with rocephin for 3 days, switch to Omnicef if discharged tomorrow.   · CKD: creatinine at baseline, monitor closely as she is getting an MRI with contrast today.   · hypoxia: typically does not require oxygen at home, wean as tolerated   · falls: will ask PT/OT to evaluate, recent extensive imaging reportedly negative for fracture. may need repeat imaging- await PT notes. will ask nursing to obtain records.    · confusion: per neurology. plans are for MRI. concerns for NPH.   · MI/HTN/HLD: per primary       Thank you very much for asking [facility] to be involved in this patient's care. We will follow along with you.      JUNIOR Harrell  [facility]  03/19/21  10:31 EDT

## 2021-03-19 NOTE — CONSULTS
Neurology Consult Note    Consult Date: 3/19/2021    Referring MD: Page Gallo MD    Reason for Consult I have been asked to see the patient in neurological consultation to render advice and opinion regarding normal pressure hydrocephalus    Benita Garcia is a 80 y.o. right-handed white female with history of coronary artery disease and rectal carcinoma which has been radiated as well as treated with chemotherapy.  Following that has been noticed that her memory was getting worse and she was having some balance issues.  However she denies any urinary incontinence.  Recently she had taken a fall when she was walking on some [place] and she slipped into a hole and fell on the ground injuring her left hip.  Fortunately she did not fracture the left hip.  She has a Rollator at home which she seldom uses.  She is currently retired living at home with her  who is also at the bedside.  The patient has received the first dose of the Covid border and a vaccine approximately 4 weeks ago and is supposed to get the second dose today at [facility] pharmacy.  When I came to see her she was sitting up in a chair without any problems.  She knew that she was in [facility] knew that today is Friday but after some cues, she was able to tell me the month was March and it was 2021.  She is able to follow one-step and two-step commands well.  Reviewed the CT of the brain with the patient and her  and noted that she has cortical atrophy with generous ventricles.    Past Medical/Surgical Hx:  Past Medical History:   Diagnosis Date   • Acute renal failure (CMS/HCC) 08/2020   • Anal cancer (CMS/HCC) 01/2020    SQUAMOUS CELL CARCINOMA, FOLLOWED BY DR. JOVITA BAUTISTA   • Aortic atherosclerosis (CMS/HCC)    • Arthritis    • Boil    • Chronic constipation    • Colon polyps     FOLLOWED BY DR. JOVITA BAUTISTA   • Dehydration 8/7/2020   • Depression    • Elevated liver enzymes 08/2020   • Encounter for screening mammogram for  breast cancer     09/2014, 10/2014, 11/2015, 11/2016, 11/2017, 11/2018, 11/2019.   • Exudative senile macular retinal degeneration (CMS/HCC)    • Fall 08/07/2020    ADMITT[facility] TO [facility]   • GERD (gastroesophageal reflux disease)     FOLLOWED BY DR. IMTIAZ SELLERS   • Hemorrhoids    • History of chemotherapy 2020    FOR ANAL CANCER, FOLLOWED BY DR. KASHMIR ARITA   • History of radiation therapy 2020    FOLLOWED BY DR. 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   • Weakness of both legs 09/2020     Past Surgical History:   Procedure Laterality Date   • CARDIAC CATHETERIZATION N/A 3/18/2021    Procedure: LEFT HEART CATH;  Surgeon: Page Gallo MD;  Location:  [facility];  Service: Cardiology;  Laterality: N/A;   • CARDIAC CATHETERIZATION N/A 3/18/2021    Procedure: CORONARY ANGIOGRAPHY;  Surgeon: Page Gallo MD;  Location:  [facility];  Service: Cardiology;  Laterality: N/A;   • CARDIAC CATHETERIZATION N/A 3/18/2021    Procedure: Left ventriculography;  Surgeon: Page Gallo MD;  Location:  [facility];  Service: Cardiology;  Laterality: N/A;   • COLONOSCOPY W/ POLYPECTOMY N/A 01/03/2020    LARGE MASS IN ANORECTAL AREA, BX: SQUAMOUS CELL CARCINOMA, 3 TUBULAR ADENOMA POLYPS IN ASCENDING, 3 TUBULAR ADENOMA POLYPS IN TRANSVERSE, MELANOSIS COLI, DR. IMTIAZ SELLERS AT [facility]    • ENDOSCOPY N/A 01/03/2020    DR. IMTIAZ SELLERS AT [facility]   • HYSTERECTOMY N/A 1993       Medications On Admission  Medications Prior to Admission   Medication Sig Dispense Refill Last Dose   • aspirin 81 MG EC tablet Take 81 mg by mouth Daily.   3/18/2021 at  Unknown time   • Cranberry 250 MG tablet Take  by mouth.   3/18/2021 at Unknown time   • Multiple Vitamins-Minerals (MULTIVITAMIN ADULT) tablet Take  by mouth.   3/18/2021 at Unknown time   • Multiple Vitamins-Minerals (PRESERVISION AREDS 2 PO) Take  by mouth 2 (Two) Times a Day.   3/18/2021 at Unknown time   • pravastatin (PRAVACHOL) 40 MG tablet 40 mg 2 (Two) Times a Day.   3/18/2021 at Unknown time   • vitamin C (ASCORBIC ACID) 500 MG tablet Take 500 mg by mouth Daily.   3/18/2021 at Unknown time   • Vitamin D, Cholecalciferol, (CHOLECALCIFEROL) 10 MCG (400 UNIT) tablet Take 400 Units by mouth Daily. Unsure of the exact units      • vitamin E 400 UNIT capsule Take 400 Units by mouth Daily.   3/18/2021 at Unknown time   • pantoprazole (PROTONIX) 40 MG EC tablet    Unknown at Unknown time       Allergies:  Allergies   Allergen Reactions   • Codeine Rash   • Lipitor [Atorvastatin] Rash       Social Hx:  Social History     Socioeconomic History   • Marital status:      Spouse name: Dimitri   • Number of children: 2   • Years of education: High school   • Highest education level: Not on file   Tobacco Use   • Smoking status: Former Smoker     Packs/day: 0.50     Years: 40.00     Pack years: 20.00     Types: Cigarettes     Start date: 10/31/2004   • Smokeless tobacco: Never Used   Vaping Use   • Vaping Use: Never used   Substance and Sexual Activity   • Alcohol use: Not Currently   • Drug use: Never   • Sexual activity: Yes     Partners: Male     Birth control/protection: Post-menopausal, Surgical     Comment: .       Family Hx:  Family History   Problem Relation Age of Onset   • Cancer Sister         Lung Cancer   • Lung cancer Sister 61   • Brain cancer Sister    • Hypertension Brother    • Heart disease Brother    • Cancer Sister         Brain Cancer   • Lung cancer Sister 72   • Heart disease Father    • Cancer Sister    • Lung cancer Sister    • Cancer Sister    • Lung cancer Sister        Review of  "systems  Constitutional: No fevers, chills  Eye: No recent visual problems, eye discharge  Respiratory: No shortness of breath, cough  Cardiovascular: No Chest pain, palpitations  Neurologic: No weakness, numbness  Psychiatric: No anxiety, depression  Musculoskeletal: Left hip pain secondary to the fall.  Dermatological: Bruises from the fall.  Genitourinary: Denies any urinary incontinence.    All other systems reviewed and are negative    Exam    /65   Pulse 78   Temp 98.3 °F (36.8 °C) (Oral)   Resp 16   Ht 157.5 cm (62\")   Wt 58 kg (127 lb 13.9 oz)   LMP  (LMP Unknown)   SpO2 96%   BMI 23.39 kg/m²   gen: NAD, vitals reviewed  Eyes: fundus sharp with no papilledema or retinal hemorrhages  HEENT: no nuchal rigidity  CVS: RRR, S1, S2  MS: oriented x3, recent/remote memory intact, normal attention/concentration, language intact, no neglect, normal fund of knowledge  CN: visual acuity grossly normal, visual fields full, PERRL, EOMI, facial sensation equal, no facial droop, hearing symmetric, palate elevates symmetrically, shoulder shrug equal, tongue midline  Motor: 5/5 throughout upper and lower extremities, normal tone  Sensation: intact to vibration and temperature throughout  Reflexes: 2+ throughout upper and lower extremities, downgoing plantars  Coordination: no dysmetria with finger to nose bilaterally, normal finger-to-nose coordination normal heel-to-shin testing  Gait: Patient currently sitting up in the chair without problems.  She will be assessed by physical therapy for balance testing along with walker.  DATA:    Lab Results   Component Value Date    GLUCOSE 97 03/19/2021    CALCIUM 8.9 03/19/2021     03/19/2021    K 3.8 03/19/2021    CO2 20.7 (L) 03/19/2021     03/19/2021    BUN 21 03/19/2021    CREATININE 1.59 (H) 03/19/2021    EGFRIFNONA 31 (L) 03/19/2021    BCR 13.2 03/19/2021    ANIONGAP 11.3 03/19/2021     Lab Results   Component Value Date    WBC 9.42 03/19/2021    HGB " 9.3 (L) 03/19/2021    HCT 27.8 (L) 03/19/2021    MCV 95.5 03/19/2021     03/19/2021     Lab Results   Component Value Date    LDL 76 03/19/2021    LDL 53 08/08/2020     Lab Results   Component Value Date    HGBA1C 5.70 (H) 03/19/2021     No results found for: INR, PROTIME    Lab review: Low hemoglobin and hematocrit and the creatinine is elevated at 1.6 with GFR of 33.    Imaging review: Reviewed the CT of the brain in details on the PACS with the patient and her  and it was noted that she has cortical atrophy with generous ventricles.    Diagnoses:  Patient does not have the classical symptoms of normal pressure hydrocephalus.  Nevertheless she had declined offer neurological status following completion of the course of radiation and chemotherapy.    Comment: Discussed about getting an MRI of the brain with and without contrast to make sure there is no evidence of neoplasm and also to look for absorption of the CSF in the periventricular area.    PLAN: MRI of the brain as planned above.    Recommendations discussed with with the patient and her  and also with Dr. Page Gallo and spent 70 minutes in face-to-face evaluation and management of the patient.  Follow-up after neuroimaging has been completed and physical therapy has evaluated the patient.

## 2021-03-19 NOTE — PROGRESS NOTES
"    Patient Name: Benita Garcia  :1940  80 y.o.      Patient Care Team:  Khadra Devine APRN as PCP - General (Nurse Practitioner)  Zen Franco MD as Consulting Physician (Gastroenterology)  Ezequiel Gray MD as Consulting Physician (Hematology and Oncology)  Rosa Christianson MD as Referring Physician (Internal Medicine)  Mariana Beyer MD as Consulting Physician (Radiation Oncology)    Chief Complaint:   NSTEMI  Interval History:      Looks much better than yesterday.  No further chest pain.  No radial artery problems overnight.  Objective   Vital Signs  Temp:  [98.1 °F (36.7 °C)-99.1 °F (37.3 °C)] 98.3 °F (36.8 °C)  Heart Rate:  [] 78  Resp:  [16-20] 16  BP: ()/(37-84) 114/65    Intake/Output Summary (Last 24 hours) at 3/19/2021 1033  Last data filed at 3/19/2021 0835  Gross per 24 hour   Intake 750 ml   Output 450 ml   Net 300 ml     Flowsheet Rows      First Filed Value   Admission Height  157.5 cm (62\") Documented at 2021 1313   Admission Weight  56.7 kg (125 lb) Documented at 2021 1313          Physical Exam:   General Appearance:    Alert, cooperative, in no acute distress, thin, chronically ill-appearing   Lungs:     Clear to auscultation.  Normal respiratory effort and rate.      Heart:    Regular rhythm and normal rate, normal S1 and S2, no murmurs, gallops or rubs.     Chest Wall:    No abnormalities observed   Abdomen:     Soft, nontender, positive bowel sounds.     Extremities:   no cyanosis, clubbing or edema.  No marked joint deformities.  Adequate musculoskeletal strength.       Results Review:    Results from last 7 days   Lab Units 21  0855   SODIUM mmol/L 137   POTASSIUM mmol/L 3.8   CHLORIDE mmol/L 105   CO2 mmol/L 20.7*   BUN mg/dL 21   CREATININE mg/dL 1.59*   GLUCOSE mg/dL 97   CALCIUM mg/dL 8.9     Results from last 7 days   Lab Units 21  1311   TROPONIN T ng/mL 2.800*     Results from last 7 days   Lab Units " 03/19/21  0348   WBC 10*3/mm3 9.42   HEMOGLOBIN g/dL 9.3*   HEMATOCRIT % 27.8*   PLATELETS 10*3/mm3 418             Results from last 7 days   Lab Units 03/19/21  0348   CHOLESTEROL mg/dL 139   TRIGLYCERIDES mg/dL 165*   HDL CHOL mg/dL 35*   LDL CHOL mg/dL 76               Medication Review:   cefTRIAXone, 1 g, Intravenous, Q24H  clopidogrel, 75 mg, Oral, Daily              Assessment/Plan   1.  Non-STEMI  2.  Memory loss  3.  Incontinence  4.  Fall    Aspirin, Plavix, metoprolol, high intensity statin.  No further chest pain.  No plans for prevention on subtotally occluded RCA.  Plan echocardiogram today though EF appears preserved on LV gram.  Appreciate neuro recs for NPH work-up  Plan to transfer to floor.  Possible home discharge tomorrow morning.    Page Gallo MD  [facility]  03/19/21  10:33 EDT

## 2021-03-19 NOTE — PROGRESS NOTES
Discharge Planning Assessment  [facility]     Patient Name: Benita Garcia  MRN: [id number]  Today's Date: 3/19/2021    Admit Date: 3/18/2021    Discharge Needs Assessment     Row Name 03/19/21 1131       Living Environment    Lives With  spouse    Name(s) of Who Lives With Patient  Milad Garcia- spouse    Current Living Arrangements  home/apartment/condo    Primary Care Provided by  self    Provides Primary Care For  no one    Family Caregiver if Needed  spouse    Quality of Family Relationships  supportive;helpful;involved       Resource/Environmental Concerns    Resource/Environmental Concerns  none    Transportation Concerns  car, none       Transition Planning    Patient/Family Anticipates Transition to  home;home with family    Patient/Family Anticipated Services at Transition  none    Transportation Anticipated  family or friend will provide       Discharge Needs Assessment    Equipment Currently Used at Home  walker, standard    Concerns to be Addressed  denies needs/concerns at this time    Anticipated Changes Related to Illness  none    Equipment Needed After Discharge  none    Provided Post Acute Provider List?  N/A        Discharge Plan     Row Name 03/19/21 1132       Plan    Plan  Home w/ spouse; denies needs    Patient/Family in Agreement with Plan  yes    Plan Comments  Met in room with patient and her spouse Milad.  Introduced self and explained role.  Facesheet verified. PCP is Dr Wagner with [facility] and facesheet was updated.  IMM provided and copy provided to spouse.  Denies any d/c needs at this time.        Continued Care and Services - Admitted Since 3/18/2021    Coordination has not been started for this encounter.         Demographic Summary     Row Name 03/19/21 1130       General Information    Admission Type  inpatient    Arrived From  home    Required Notices Provided  Important Message from Medicare    Referral Source  admission list    Reason for Consult  discharge planning     Preferred Language  English     Used During This Interaction  no       Contact Information    Permission Granted to Share Info With  ;family/designee        Functional Status     Row Name 03/19/21 1131       Functional Status    Usual Activity Tolerance  good    Current Activity Tolerance  good       Functional Status, IADL    Medications  independent    Meal Preparation  independent    Housekeeping  independent    Laundry  independent    Shopping  independent       Mental Status    General Appearance WDL  WDL       Mental Status Summary    Recent Changes in Mental Status/Cognitive Functioning  no changes       Employment/    Employment Status  retired              Shaq Bliss RN

## 2021-03-19 NOTE — PLAN OF CARE
Goal Outcome Evaluation:         Pt remains in ccu, chest pain free throughout night. Cath site CDI no hematoma. See am labs.

## 2021-03-19 NOTE — THERAPY EVALUATION
Patient Name: Benita Garcia  : 1940    MRN: [id number]                              Today's Date: 3/19/2021       Admit Date: 3/18/2021    Visit Dx:     ICD-10-CM ICD-9-CM   1. Inferior MI (CMS/HCC)  I21.19 410.40   2. Confusion and disorientation  R41.0 780.97   3. Confusion  R41.0 298.9     Patient Active Problem List   Diagnosis   • Rectal mass   • Anal cancer (CMS/HCC)   • Falls   • Weakness   • LIAM (acute kidney injury) (CMS/HCC)   • Dehydration   • Metabolic acidosis   • Weight loss   • Hyperlipidemia   • Hypertension   • Leukocytosis   • PMR (polymyalgia rheumatica) (CMS/HCC)   • Inferior MI (CMS/HCC)   • CAD (coronary artery disease)   • CKD (chronic kidney disease)   • Urinary incontinence   • Confusion   • Hypoxia     Past Medical History:   Diagnosis Date   • Acute renal failure (CMS/HCC) 2020   • Anal cancer (CMS/HCC) 2020    SQUAMOUS CELL CARCINOMA, FOLLOWED BY DR. JOVITA BAUTISTA   • Aortic atherosclerosis (CMS/HCC)    • Arthritis    • Boil    • Chronic constipation    • Colon polyps     FOLLOWED BY DR. JOVITA BAUTISTA   • Dehydration 2020   • Depression    • Elevated liver enzymes 2020   • Encounter for screening mammogram for breast cancer     2014, 10/2014, 2015, 2016, 2017, 2018, 2019.   • Exudative senile macular retinal degeneration (CMS/HCC)    • Fall 2020    ADMITTED TO [facility]   • GERD (gastroesophageal reflux disease)     FOLLOWED BY DR. IMTIAZ SELLERS   • Hemorrhoids    • History of chemotherapy     FOR ANAL CANCER, FOLLOWED BY DR. KASHMIR ARITA   • History of radiation therapy     FOLLOWED BY DR. ANGELIQUE QUIROGA   • Hyperlipidemia    • Hypertension    • Leukocytosis 2020   • Melanosis coli 2020   • Metabolic acidosis 2020   • Osteoporosis    • Peripheral neuropathy    • Postmenopausal disorder    • Pulmonary HTN (CMS/HCC)    • Rectal bleeding    • Senile purpura (CMS/HCC)    • Transfusion history     Hx of blood transfussion.    • Weakness of both legs 09/2020     Past Surgical History:   Procedure Laterality Date   • CARDIAC CATHETERIZATION N/A 3/18/2021    Procedure: LEFT HEART CATH;  Surgeon: Page Gallo MD;  Location:  [facility];  Service: Cardiology;  Laterality: N/A;   • CARDIAC CATHETERIZATION N/A 3/18/2021    Procedure: CORONARY ANGIOGRAPHY;  Surgeon: Page Gallo MD;  Location:  [facility];  Service: Cardiology;  Laterality: N/A;   • CARDIAC CATHETERIZATION N/A 3/18/2021    Procedure: Left ventriculography;  Surgeon: Page Gallo MD;  Location:  [facility];  Service: Cardiology;  Laterality: N/A;   • COLONOSCOPY W/ POLYPECTOMY N/A 01/03/2020    LARGE MASS IN ANORECTAL AREA, BX: SQUAMOUS CELL CARCINOMA, 3 TUBULAR ADENOMA POLYPS IN ASCENDING, 3 TUBULAR ADENOMA POLYPS IN TRANSVERSE, MELANOSIS COLI, DR. IMTIAZ SELLERS AT [facility]    • ENDOSCOPY N/A 01/03/2020    DR. IMTIAZ SELLERS AT [facility]   • HYSTERECTOMY N/A 1993     General Information     Row Name 03/19/21 1429          Physical Therapy Time and Intention    Document Type  evaluation  -PC     Mode of Treatment  physical therapy  -PC     Row Name 03/19/21 1429          General Information    Patient Profile Reviewed  yes  -PC     Prior Level of Function  independent: recent fall  -PC     Existing Precautions/Restrictions  fall  -PC     Row Name 03/19/21 1429          Living Environment    Lives With  spouse  -PC     Row Name 03/19/21 1429          Home Main Entrance    Number of Stairs, Main Entrance  two  -PC     Row Name 03/19/21 1429          Stairs Within Home, Primary    Number of Stairs, Within Home, Primary  none  -PC     Row Name 03/19/21 1429          Cognition    Orientation Status (Cognition)  oriented x 3  -PC     Row Name 03/19/21 1429          Safety Issues, Functional Mobility    Safety Issues Affecting Function (Mobility)  insight into deficits/self-awareness  -PC      Impairments Affecting Function (Mobility)  balance  -PC       User Key  (r) = Recorded By, (t) = Taken By, (c) = Cosigned By    Initials Name Provider Type    PC Wendy De Jesus PT Physical Therapist        Mobility     Row Name 03/19/21 1430          Bed Mobility    Bed Mobility  supine-sit  -PC     Supine-Sit [place] (Bed Mobility)  standby assist  -PC     Row Name 03/19/21 1430          Sit-Stand Transfer    Sit-Stand [place] (Transfers)  standby assist  -PC     Row Name 03/19/21 1430          Gait/Stairs (Locomotion)    [place] Level (Gait)  supervision  -PC     Assistive Device (Gait)  walker, front-wheeled  -PC     Distance in Feet (Gait)  200 ft, antalgic L hip with weight bearing, amb with and without rwx, pt did better with rwx  -PC     Deviations/Abnormal Patterns (Gait)  antalgic;base of support, wide  -PC       User Key  (r) = Recorded By, (t) = Taken By, (c) = Cosigned By    Initials Name Provider Type    PC Wendy De Jesus PT Physical Therapist        Obj/Interventions     Row Name 03/19/21 1433          Range of Motion Comprehensive    General Range of Motion  no range of motion deficits identified  -[facility] Name 03/19/21 1433          Strength Comprehensive (MMT)    General Manual Muscle Testing (MMT) Assessment  no strength deficits identified  -[facility] Name 03/19/21 1433          Balance    Balance Assessment  sitting static balance;sitting dynamic balance;standing static balance;standing dynamic balance  -PC     Static Sitting Balance  WNL  -PC     Dynamic Sitting Balance  WNL  -PC     Static Standing Balance  WFL  -PC     Dynamic Standing Balance  mild impairment  -       User Key  (r) = Recorded By, (t) = Taken By, (c) = Cosigned By    Initials Name Provider Type    PC Wendy De Jesus, PT Physical Therapist        Goals/Plan     Row Name 03/19/21 1439          Gait Training Goal 1 (PT)    Activity/Assistive Device (Gait Training Goal 1, PT)  gait (walking  locomotion);assistive device use  -PC     Hill Level (Gait Training Goal 1, PT)  standby assist  -PC     Distance (Gait Training Goal 1, PT)  300 ft  -PC     Time Frame (Gait Training Goal 1, PT)  1 week  -PC       User Key  (r) = Recorded By, (t) = Taken By, (c) = Cosigned By    Initials Name Provider Type    PC Wendy De Jesus, PT Physical Therapist        Clinical Impression     Row Name 03/19/21 1434          Pain    Additional Documentation  Pain Scale: Numbers Pre/Post-Treatment (Group)  -PC     Row Name 03/19/21 1434          Pain Scale: Numbers Pre/Post-Treatment    Pain Location - Side  Left  -PC     Pain Location  hip  -PC     Pre/Posttreatment Pain Comment  mild L hip pain, pt did not rate, but had mild antalgic gait with L LE weight bearing  -PC     Pain Intervention(s)  Repositioned  -PC     Row Name 03/19/21 1434          Plan of Care Review    Plan of Care Reviewed With  patient;spouse  -PC     Outcome Summary  pt did well today with mobility, able to walk with supervision with rwx, normal strength and ROM, mild balance deficit, has some L hip pain and use of walker assisted pt with pain and balance deficit, rec pt use her rollator at home, pt will be safe to go home with assist of spouse and may benefit from home health PT as spouse reports recent falls, and he reports that pt doesn't move much at home.  -     Row Name 03/19/21 1434          Therapy Assessment/Plan (PT)    Rehab Potential (PT)  good, to achieve stated therapy goals  -     Criteria for Skilled Interventions Met (PT)  yes;skilled treatment is necessary  -     Row Name 03/19/21 1434          Positioning and Restraints    Pre-Treatment Position  in bed  -PC     Post Treatment Position  chair  -PC     In Chair  reclined;call light within reach;encouraged to call for assist;with family/caregiver;notified nsg  -PC       User Key  (r) = Recorded By, (t) = Taken By, (c) = Cosigned By    Initials Name Provider Type    PC Neal  Wendy WOLFF PT Physical Therapist        Outcome Measures     Row Name 03/19/21 1440          How much help from another person do you currently need...    Turning from your back to your side while in flat bed without using bedrails?  4  -PC     Moving from lying on back to sitting on the side of a flat bed without bedrails?  3  -PC     Moving to and from a bed to a chair (including a wheelchair)?  3  -PC     Standing up from a chair using your arms (e.g., wheelchair, bedside chair)?  3  -PC     Climbing 3-5 steps with a railing?  3  -PC     To walk in hospital room?  3  -PC     AM-PAC 6 Clicks Score (PT)  19  -PC     Row Name 03/19/21 1440          Functional Assessment    Outcome Measure Options  AM-PAC 6 Clicks Basic Mobility (PT)  -PC       User Key  (r) = Recorded By, (t) = Taken By, (c) = Cosigned By    Initials Name Provider Type    PC Wendy De Jesus, PT Physical Therapist        Physical Therapy Education                 Title: PT OT SLP Therapies (Done)     Topic: Physical Therapy (Done)     Point: Mobility training (Done)     Learning Progress Summary           Patient Acceptance, E,D, DU by PC at 3/19/2021 1440                   Point: Home exercise program (Done)     Learning Progress Summary           Patient Acceptance, E,D, DU by PC at 3/19/2021 1440                   Point: Precautions (Done)     Learning Progress Summary           Patient Acceptance, E,D, DU by  at 3/19/2021 1440                               User Key     Initials Effective Dates Name Provider Type Discipline     04/03/18 -  Wendy De Jesus PT Physical Therapist PT              PT Recommendation and Plan  Planned Therapy Interventions (PT): balance training, gait training  Plan of Care Reviewed With: patient, spouse  Outcome Summary: pt did well today with mobility, able to walk with supervision with rwx, normal strength and ROM, mild balance deficit, has some L hip pain and use of walker assisted pt with pain and balance  deficit, rec pt use her rollator at home, pt will be safe to go home with assist of spouse and may benefit from home health PT as spouse reports recent falls, and he reports that pt doesn't move much at home.     Time Calculation:   PT Charges     Row Name 03/19/21 1441             Time Calculation    Start Time  1415  -PC      Stop Time  1433  -PC      Time Calculation (min)  18 min  -PC      PT Received On  03/19/21  -PC      PT - Next Appointment  03/20/21  -PC      PT Goal Re-Cert Due Date  03/26/21  -PC         Time Calculation- PT    Total Timed Code Minutes- PT  11 minute(s)  -PC        User Key  (r) = Recorded By, (t) = Taken By, (c) = Cosigned By    Initials Name Provider Type    PC Wendy De Jesus PT Physical Therapist        Therapy Charges for Today     Code Description Service Date Service Provider Modifiers Qty    89775947880 HC PT EVAL LOW COMPLEXITY 2 3/19/2021 Wendy De Jesus, PT GP 1    35048324472 HC PT THER PROC EA 15 MIN 3/19/2021 Wendy De Jesus, PT GP 1          PT G-Codes  Outcome Measure Options: AM-PAC 6 Clicks Basic Mobility (PT)  AM-PAC 6 Clicks Score (PT): 19    Wendy De Jesus PT  3/19/2021

## 2021-03-20 VITALS
BODY MASS INDEX: 23.37 KG/M2 | TEMPERATURE: 98.1 F | HEIGHT: 62 IN | WEIGHT: 127 LBS | RESPIRATION RATE: 16 BRPM | OXYGEN SATURATION: 95 % | DIASTOLIC BLOOD PRESSURE: 82 MMHG | HEART RATE: 95 BPM | SYSTOLIC BLOOD PRESSURE: 142 MMHG

## 2021-03-20 PROBLEM — R41.0 CONFUSION AND DISORIENTATION: Status: ACTIVE | Noted: 2021-03-20

## 2021-03-20 LAB — QT INTERVAL: 331 MS

## 2021-03-20 PROCEDURE — 99232 SBSQ HOSP IP/OBS MODERATE 35: CPT | Performed by: NURSE PRACTITIONER

## 2021-03-20 PROCEDURE — 99239 HOSP IP/OBS DSCHRG MGMT >30: CPT | Performed by: NURSE PRACTITIONER

## 2021-03-20 RX ORDER — CLOPIDOGREL BISULFATE 75 MG/1
75 TABLET ORAL DAILY
Qty: 90 TABLET | Refills: 3 | Status: SHIPPED | OUTPATIENT
Start: 2021-03-21

## 2021-03-20 RX ADMIN — CLOPIDOGREL 75 MG: 75 TABLET, FILM COATED ORAL at 08:54

## 2021-03-20 RX ADMIN — ASPIRIN 81 MG: 81 TABLET, COATED ORAL at 08:54

## 2021-03-20 RX ADMIN — METOPROLOL TARTRATE 25 MG: 25 TABLET, FILM COATED ORAL at 08:54

## 2021-03-20 RX ADMIN — SODIUM CHLORIDE, PRESERVATIVE FREE 10 ML: 5 INJECTION INTRAVENOUS at 08:54

## 2021-03-20 NOTE — PROGRESS NOTES
Name: Benita Garcai ADMIT: 3/18/2021   : 1940  PCP: Neetu Bryson MD    MRN: [id number] LOS: 1 days   AGE/SEX: 80 y.o. female  ROOM: [facility]     Subjective   Subjective   No events overnight. She still denies urinary symptoms, fevers, sweats, chills. Unfortunately, it looks like they've missed their appointment for covid vaccination.     Objective   Objective   Vital Signs  Temp:  [97.4 °F (36.3 °C)-98.1 °F (36.7 °C)] 98.1 °F (36.7 °C)  Heart Rate:  [68-95] 95  Resp:  [16-17] 16  BP: (127-165)/(72-93) 142/82  SpO2:  [93 %-98 %] 95 %  on   ;   Device (Oxygen Therapy): room air  Body mass index is 23.23 kg/m².  Physical Exam  Constitutional:       General: She is not in acute distress.     Appearance: She is not toxic-appearing.   HENT:      Head: Normocephalic and atraumatic.   Cardiovascular:      Rate and Rhythm: Normal rate and regular rhythm.      Heart sounds: Normal heart sounds.   Pulmonary:      Effort: Pulmonary effort is normal.      Breath sounds: Normal breath sounds.   Abdominal:      General: Bowel sounds are normal.      Palpations: Abdomen is soft.   Musculoskeletal:         General: No swelling.      Right lower leg: No edema.      Left lower leg: No edema.   Skin:     General: Skin is warm and dry.      Findings: No rash.   Neurological:      Mental Status: She is alert. Mental status is at baseline. She is disoriented.   Psychiatric:         Mood and Affect: Mood normal.         Behavior: Behavior normal.         Results Review     I reviewed the patient's new clinical results.  Results from last 7 days   Lab Units 21  0348 21  1311   WBC 10*3/mm3 9.42 14.05*   HEMOGLOBIN g/dL 9.3* 11.6*   PLATELETS 10*3/mm3 418 406     Results from last 7 days   Lab Units 21  0855 21  1311   SODIUM mmol/L 137 137   POTASSIUM mmol/L 3.8 3.9   CHLORIDE mmol/L 105 102   CO2 mmol/L 20.7* 22.1   BUN mg/dL 21 25*   CREATININE mg/dL 1.59* 1.54*   GLUCOSE mg/dL 97 129*    Estimated Creatinine Clearance: 26.5 mL/min (A) (by C-G formula based on SCr of 1.59 mg/dL (H)).  Results from last 7 days   Lab Units 03/18/21  1311   ALBUMIN g/dL 3.80   BILIRUBIN mg/dL 0.5   ALK PHOS U/L 130*   AST (SGOT) U/L 58*   ALT (SGPT) U/L 17     Results from last 7 days   Lab Units 03/19/21  0855 03/18/21  1311   CALCIUM mg/dL 8.9 9.2   ALBUMIN g/dL  --  3.80     Results from last 7 days   Lab Units 03/18/21  1426 03/18/21  1311   PROCALCITONIN ng/mL  --  0.52*   LACTATE mmol/L 1.1  --      COVID19   Date Value Ref Range Status   03/18/2021 Not Detected Not Detected - Ref. Range Final   08/07/2020 Not Detected Not Detected - Ref. Range Final     Hemoglobin A1C   Date/Time Value Ref Range Status   03/19/2021 0348 5.70 (H) 4.80 - 5.60 % Final     Glucose   Date/Time Value Ref Range Status   03/19/2021 0721 101 70 - 130 mg/dL Final   03/18/2021 1958 118 70 - 130 mg/dL Final   03/18/2021 1625 131 (H) 70 - 130 mg/dL Final   03/18/2021 1309 131 (H) 70 - 130 mg/dL Final       MRI Brain With & Without Contrast  MR SCAN OF THE BRAIN WITHOUT AND WITH INTRAVENOUS CONTRAST     HISTORY: Possible normal pressure hydrocephalus. History of rectal  cancer treated with chemotherapy. Confusion.     TECHNIQUE: The MR scan was performed with sagittal and axial and coronal  images and includes T1 images without and with intravenous contrast.      FINDINGS: There is moderate diffuse atrophy and considerable chronic  small vessel ischemic change. The size of the ventricles is not out of  proportion to the cortical atrophy. The overall appearance does not  suggest normal pressure hydrocephalus.     There is no evidence of intracranial hemorrhage or abnormal enhancement  or mass effect. The diffusion sequence shows no evidence of acute  infarct. There are normal flow voids in the major vessels. The sinuses  and mastoid air cells are clear.     CONCLUSION: Moderate diffuse atrophy and considerable chronic small  vessel ischemic  change. The overall appearance does not suggest normal  pressure hydrocephalus. The remainder of the MRI scan is unremarkable.     This report was finalized on 3/19/2021 8:00 PM by Dr. Cristo Bryant M.D.     Adult Transthoracic Echo Complete W/ Cont if Necessary Per Protocol  · Estimated right ventricular systolic pressure from tricuspid   regurgitation is normal (<35 mmHg).  · Left ventricular wall thickness is consistent with mild concentric   hypertrophy.  · Left ventricular diastolic function is consistent with (grade I)   impaired relaxation.  · Left ventricular ejection fraction appears to be 56 - 60%. Left   ventricular systolic function is normal.     Cardiac Catheterization/Vascular Study  · Subtotally occluded proximal RCA  · Normal LV function with mild inferior hypokinesis  · Medical management     INDICATION FOR PROCEDURE: 80-year-old woman who presents with chest pain,   abnormal EKG and troponin.    PROCEDURE PERFORMED:  Ultrasound guided arterial acces, Left heart catheterization, coronary   angiography, left ventriculography, attempted PCI    PROCEDURE COMMENTS:   After informed consent was obtained, the patient was prepped and draped in   the usual manner.  Moderate sedation was administered.  Lidocaine was   infused in the right wrist for anesthesia.  Access was obtained in the   right radial artery using ultrasound guidance and micropuncture technique.   A 5/6 Slender Angolan sheath was inserted.  Coronary angiography was   performed using 5 Angolan Tiger catheters.  Left heart catheterization was   performed using a 5 Angolan pigtail catheter.  LV gram was performed using   a pigtail catheter 5 Angolan.    Based on the findings of the diagnostic coronary angiography decided to   intervene on the RCA.  I used a 6 Angolan AR-2 to engage the right coronary   artery.  I tried to cross the lesion using a run-through wire, followed by   a whisper ES, followed load by a Fielder XT.  Due to the extreme    tortuosity and heavy calcification I was unable to cross the lesion.  The   lesion had distal YAMILA II flow and was heavily calcified throughout.  I   felt there was high risk for dissection of the vessel and losing flow   completely.  Given the fact that the patient had no further angina after   this morning and is hemodynamically stable with the normal LV function I   decided to abandon the procedure and   treat her medically.  I remove the interventional wires and the guiding   catheter.  GRE show evidence of distal embolization, perforation or dissection  A TR band was used for hemostasis    HEMODYNAMICS:  LV: 106/5  LVEDP: 5  AORTIC: 110/50  EF: 60 to 65%  WALL MOTION: Inferior hypokinesis    CORONARY ANGIOGRAPHY:  LEFT MAIN: Short, quickly bifurcates the LAD and circumflex arteries.  LAD: The LAD is heavily calcified vessel.  Large-caliber in the proximal   segment with mild irregularities.  It gives rise to a large caliber first   diagonal which is heavily calcified and has mild irregularities at the   ostium.  After the bifurcation there is a moderate lesion in a tortuous   segment of the LAD about 40%.  The remainder of the LAD is heavily   calcified with mild irregularities throughout.  It wraps the apex.  RAMUS: Absent  CIRCUMFLEX: Circumflex is a medium caliber vessel.  Calcified.  The   proximal portion is normal.  The midportion gives rise to a medium caliber   first OM which is essentially normal.  RCA: The RCA is heavily calcified throughout the entire length of the   vessel.  The proximal portion has a subtotal 99% occlusion which is   heavily calcified and a severe bend.  The remainder of the RCA has   moderate disease distally about 50%.  The RPDA and PL are small caliber   normal    CORONARY INTERVENTION FINDINGS:  PCI CORONARY SEGMENT: Proximal RCA  PRE-STENOSIS: 99%  POST-STENOSIS: 99%  LESION TYPE: C  YAMILA FLOW PRE/POST: 2, 2  CULPRIT LESION: Yes  DISSECTION:    No    Scheduled  Medications  aspirin, 81 mg, Oral, Daily  atorvastatin, 40 mg, Oral, Nightly  clopidogrel, 75 mg, Oral, Daily  metoprolol tartrate, 25 mg, Oral, Q12H    Infusions   Diet  Diet Regular       Assessment/Plan     Active Hospital Problems    Diagnosis  POA   • **CAD (coronary artery disease) [I25.10]  Yes   • Confusion and disorientation [R41.0]  Unknown   • CKD (chronic kidney disease) [N18.9]  Yes   • Urinary incontinence [R32]  Yes   • Confusion [R41.0]  Yes   • Hypoxia [R09.02]  Unknown   • Inferior MI (CMS/HCC) [I21.19]  Yes   • Hypertension [I10]  Yes   • Hyperlipidemia [E78.5]  Yes   • Falls [W19.XXXA]  Yes   • Rectal mass [K62.89]  Yes   • Anal cancer (CMS/HCC) [C21.0]  Yes      Resolved Hospital Problems   No resolved problems to display.       80 y.o. female admitted with Inferior MI (CMS/HCC).    MRI showed moderate diffuse atrophy with chronic small vessel ischemic change, but was not suggestive of NPH.     She is on asa, plavix, statin per cardiology for her NSTEMI.     She has no urinary symptoms and her urine culture has less than 35155 cfu/ml of mixed layla suggesting contamination. She is hemodynamically stable, and her leukocytosis is better explained by stress from her MI (also, it resolved prior to initiation of antibiotics). I discontinued her ceftriaxone yesterday and so she only received one dose. I do not recommend any further treatment with antibiotics.    She is okay for discharge from my perspective. I don't recommend any changes from her home medications other than those adjusted by cardiology (addition of plavix/statin).    Marlon Carlin MD  [facility]  03/20/21  11:35 EDT    Patient was wearing facemask when I entered the room and throughout our encounter.  I wore protective equipment throughout this patient encounter including a face mask, gloves and protective eyewear.  Hand hygiene was performed before donning protective equipment and after removal when leaving the  room.

## 2021-03-20 NOTE — DISCHARGE SUMMARY
Date of Discharge:  3/20/2021  Date of Admit: 3/18/2021   Primary Cardiologist: Dr. Page Gallo    Discharge Diagnosis:  Inferior MI (CMS/HCC)    Rectal mass    Anal cancer (CMS/HCC)    Falls    Hyperlipidemia    Hypertension    CAD (coronary artery disease)    CKD (chronic kidney disease)    Urinary incontinence    Confusion    Hypoxia    Confusion and disorientation      Hospital Course:     Mrs. Garcia is a pleasant 80-year-old female admitted to [facility] on 3/18/2021.  She has a past medical history of anal cancer, chronic kidney disease, and memory issues.    On 3/18/2021, she woke up in the morning complaining of chest pain and had urinary incontinence for the first time that night.  Her  noted that she has been increasingly confused and very forgetful over the past few weeks.  He feels that she has had a decline in her memory over the past 6 months and he felt that it may have been due to chemotherapy.  She also suffered a recent fall a week ago and her  found her outside in a ditch.    Her troponin was elevated at 2.8 and EKG showed inferior Q waves and ST elevation.  She underwent left heart catheterization which showed heavily calcified 99% subtotal stenosis in a tortuous segment of the proximal RCA and preserved LVEF.  Dr. Gallo  tried to cross the lesion, but was unable to.  Given preserved distal flow, the lesion was recommended to be treated medically with aspirin, clopidogrel, and statin therapy.  Yesterday neurology and medicine were consulted for neuro issues/memory loss, falls, and urinary incontinence.  There was concern for normal pressure hydrocephalus.    Echocardiogram completed 3/19/2021 showed LVEF normal 56-60%, mild LVH, grade 1 diastolic dysfunction, left atrium borderline dilated, aortic valve calcification, moderate mitral annular calcification, and trace mitral regurgitation.    MRI of the brain showed moderate diffuse atrophy and considerable chronic  small vessel ischemic change.  Overall appearance was not suggestive of normal pressure hydrocephalus.  Neurology spoke with patient today about results and said that she could be discharged from their standpoint.  They will arrange an outpatient follow-up appointment for her recent confusion and memory loss.    She is sitting up up in the chair today.  She denies chest pain, shortness of breath, palpitations, or dizziness.      PHYSICAL EXAM:    Vitals Reviewed.   General Appearance: No acute distress, well developed and well nourished. Thin.   Eyes: Conjunctivae and lids: No erythema, swelling, or discharge. Sclerae anicteric.   HENT: Atraumatic, normocephalic. External eyes, ears, and nose normal. No hearing loss noted. Mucous membranes normal. Lips not cyanotic. Neck supple with no tenderness.  Respiratory: No signs of respiratory distress. Respiration rhythm and depth normal.   Clear to auscultation. No rales, crackles, rhonchi, or wheezing auscultated.   Cardiovascular:  Jugular Venous Pressure: Normal  Heart Rate and Rhythm: Normal, Heart Sounds: Normal S1 and S2. No S3 or S4 noted.  Murmurs: No murmurs noted. No rubs, thrills, or gallops.   Lower Extremities: No edema noted.  Gastrointestinal:  Abdomen soft, nondistended, nontender. Normal bowel sounds. No hepatomegaly.   Musculoskeletal: Normal movement of extremities  Skin and Nails: General appearance normal. No pallor, cyanosis, diaphoresis. Skin temperature normal. No clubbing of fingernails.   Psychiatric: Patient alert and oriented to person, place, and time. Speech and behavior appropriate. Normal mood and affect.     Condition on Discharge: stable    Discharge Medications     Discharge Medications      New Medications      Instructions Start Date   clopidogrel 75 MG tablet  Commonly known as: PLAVIX   75 mg, Oral, Daily   Start Date: March 21, 2021     metoprolol tartrate 25 MG tablet  Commonly known as: LOPRESSOR   25 mg, Oral, Every 12 Hours  Scheduled         Continue These Medications      Instructions Start Date   aspirin 81 MG EC tablet   81 mg, Oral, Daily      Cranberry 250 MG tablet   Oral      Multivitamin Adult tablet tablet  Generic drug: multivitamin with minerals   Oral      PRESERVISION AREDS 2 PO   Oral, 2 Times Daily      pantoprazole 40 MG EC tablet  Commonly known as: PROTONIX   No dose, route, or frequency recorded.      pravastatin 40 MG tablet  Commonly known as: PRAVACHOL   40 mg, 2 Times Daily      vitamin C 500 MG tablet  Commonly known as: ASCORBIC ACID   500 mg, Oral, Daily      Vitamin D (Cholecalciferol) 10 MCG (400 UNIT) tablet  Commonly known as: CHOLECALCIFEROL   400 Units, Oral, Daily, Unsure of the exact units       vitamin E 400 UNIT capsule   400 Units, Oral, Daily             Discharge Diet:   Diet Instructions     Diet: Cardiac      Discharge Diet: Cardiac      Heart Healthy.     Activity at Discharge:   Activity Instructions     Activity as Tolerated        Up as tolerated.     Discharge disposition: home    Follow-up Appointments  Future Appointments   Date Time Provider Department Center   4/16/2021 10:30 AM Barry Stack MD [facility]   5/10/2021 10:30 AM LAB CHAIR 6 [facility]  [facility]   5/10/2021 11:00 AM Ezequiel Gray MD [facility]       Additional Instructions for the Follow-ups that You Need to Schedule     Discharge Follow-up with PCP   As directed       Currently Documented PCP:    Neetu Bryson MD    PCP Phone Number:    [phone]     Follow Up Details: in one month - patient to call to make appointment         Discharge Follow-up with Specialty: JUNIOR Pang or JUNIOR Power; 1 Week   As directed      Specialty: JUNIOR Pang or JUNIOR Power    Follow Up: 1 Week    Follow Up Details: post hospital         Discharge Follow-up with Specified Provider: Neurology - per Blanka PACHECO - her office will call to make appointment   As  directed      To: Neurology - per Blanka PACHECO - her office will call to make appointment                  ASSESSMENT AND PLAN:    1.  Non-STEMI/CAD: Noted to have a subtotally occluded RCA with YAMILA flow.  PTCA/stent placement unsuccessful.  Medical treatment recommended.  Continue aspirin, clopidogrel, pravastatin, and metoprolol.  She denies anginal symptoms today.    2.  Hypertension: Blood pressure well controlled today.    3.  Hyperlipidemia: Cholesterol is at goal at pravastatin 80 mg daily.  She has been intolerant to atorvastatin in the past.    4.  Memory loss, Fall, and Incontinence: MRI of the brain stable.  Plans for her to follow-up with neurology outpatient and Blanka Nick's office will arrange.    5.  She is stable from a cardiac standpoint for discharge today.  She will need a 1 week follow-up visit in our office with JUNIOR Pang or JUNIOR Leonard.      JUNIOR Palmer  03/20/21  12:59 EDT      Time: Greater than 30 minutes my time spent with discharge on patient.

## 2021-03-20 NOTE — PLAN OF CARE
Goal Outcome Evaluation:  Plan of Care Reviewed With: patient       Problem: Adult Inpatient Plan of Care  Goal: Plan of Care Review  Flowsheets (Taken 3/20/2021 0405)  Plan of Care Reviewed With: patient  Outcome Summary: VSS. Pleasantly confused, Disoriented to time and situation, Pt baseline, Had heart cath. Pt denies any chest pain or shortness of breath. Rt Wrist clean dry intact. No swelling or bleeding noted from CATH site. Up with assist x1 falls precautions maintained. Will continue to monitor.

## 2021-03-20 NOTE — PROGRESS NOTES
"DOS: 3/20/2021  NAME: Benita Garcia   : 1940  PCP: Neetu Bryson MD  Chief Complaint   Patient presents with   • Altered Mental Status     Neurology    Subjective: Patient denies focal weakness, numbness, speech difficulty or headache. She denies history of peripheral neuropathy or associated symptoms.No family at the bedside. She states she wants to go home. Pt seen in follow up today, however the problem is new to the examiner.      Objective:  Vital signs: /82 (BP Location: Left arm, Patient Position: Lying)   Pulse 95   Temp 98.1 °F (36.7 °C) (Oral)   Resp 16   Ht 157.5 cm (62\")   Wt 57.6 kg (127 lb)   LMP  (LMP Unknown)   SpO2 95%   BMI 23.23 kg/m²       General appearance: Well developed, well nourished, well groomed, alert and cooperative.   HEENT: Normocephalic.   Neck and spine: Normal range of motion. Normal alignment.   Cardiac: Regular rate and rhythm.   Peripheral Vasculature: Radial pulses are equal and symmetric.  Chest Exam: Clear to auscultation bilaterally, no wheezes, no rhonchi.  Extremities: Normal, no edema.   Skin: No rashes or birthmarks.     Higher integrative function: Oriented to self, location, month/day but stated the year was \"2-0-0-2.\" Naming intact, speech fluent, no dysarthria.   CN II: Normal visual fields.   CN III IV VI: Extraocular movements are full without nystagmus. Pupils are equal, round, and reactive to light.  CN V: Normal facial sensation.  CN VII: Facial movements are symmetric, no weakness.   CN VIII: Auditory acuity is normal.   CN IX & X: Symmetric palatal movement.   CN XI: Sternocleidomastoid and trapezius are normal. No weakness.   CN XII: The tongue is midline. No atrophy or fasciculations.   Motor: Normal muscle strength, bulk, and tone in upper and lower extremities. No fasciculations, rigidity, spasticity or abnormal movements.   Sensation: Normal light touch.  Station and gait: Mildly unsteady gait, not broad based or " magnetic.  Muscle stretch reflexes: Reflexes are 1+.  Coordination: Finger to nose test showed no dysmetria.       Scheduled Meds:aspirin, 81 mg, Oral, Daily  atorvastatin, 40 mg, Oral, Nightly  clopidogrel, 75 mg, Oral, Daily  metoprolol tartrate, 25 mg, Oral, Q12H      Continuous Infusions:   PRN Meds:.•  acetaminophen  •  sodium chloride  •  [COMPLETED] Insert peripheral IV **AND** sodium chloride    Laboratory results:  Lab Results   Component Value Date    GLUCOSE 97 03/19/2021    CALCIUM 8.9 03/19/2021     03/19/2021    K 3.8 03/19/2021    CO2 20.7 (L) 03/19/2021     03/19/2021    BUN 21 03/19/2021    CREATININE 1.59 (H) 03/19/2021    EGFRIFNONA 31 (L) 03/19/2021    BCR 13.2 03/19/2021    ANIONGAP 11.3 03/19/2021     Lab Results   Component Value Date    WBC 9.42 03/19/2021    HGB 9.3 (L) 03/19/2021    HCT 27.8 (L) 03/19/2021    MCV 95.5 03/19/2021     03/19/2021     Lab Results   Component Value Date    CHOL 139 03/19/2021    CHOL 123 08/08/2020     Lab Results   Component Value Date    HDL 35 (L) 03/19/2021    HDL 26 (L) 08/08/2020     Lab Results   Component Value Date    LDL 76 03/19/2021    LDL 53 08/08/2020     Lab Results   Component Value Date    TRIG 165 (H) 03/19/2021    TRIG 222 (H) 08/08/2020         Lab 03/19/21  0348   HEMOGLOBIN A1C 5.70*      Review and interpretation of imaging: MRI brain images viewed by me, there is a right hemispheric faint diffusion abnormality, no adc correlate, felt to be t2 shine through  MR SCAN OF THE BRAIN WITHOUT AND WITH INTRAVENOUS CONTRAST     HISTORY: Possible normal pressure hydrocephalus. History of rectal  cancer treated with chemotherapy. Confusion.     TECHNIQUE: The MR scan was performed with sagittal and axial and coronal  images and includes T1 images without and with intravenous contrast.      FINDINGS: There is moderate diffuse atrophy and considerable chronic  small vessel ischemic change. The size of the ventricles is not out  of  proportion to the cortical atrophy. The overall appearance does not  suggest normal pressure hydrocephalus.     There is no evidence of intracranial hemorrhage or abnormal enhancement  or mass effect. The diffusion sequence shows no evidence of acute  infarct. There are normal flow voids in the major vessels. The sinuses  and mastoid air cells are clear.     CONCLUSION: Moderate diffuse atrophy and considerable chronic small  vessel ischemic change. The overall appearance does not suggest normal  pressure hydrocephalus. The remainder of the MRI scan is unremarkable.     This report was finalized on 3/19/2021 8:00 PM by Dr. Cristo Bryant M.D.  Impression:  This patient is a 80-year-old female with HTN, HLD, CKD, pulmonary hypertension, history of anal carcinoma status post radiation/chemotherapy, macular degeneration, and aortic atherosclerosis who was admitted by cardiology 3/18 for chest pain, found to have CAD/NSTEMI, medical management recommended.  Patient's  reported that the patient had been increasingly confused over the last few weeks with a rapid decline over the previous 6 months and recent fall in a ditch.  Neurology was consulted for the symptoms and concern for NPH. CT head showed cortical atrophy and generous ventricles.  These symptoms seem to start after patient was treated with chemotherapy and radiation.  The patient denies any urinary incontinence.  Issues with urinary spasm have previously been noted by oncology.    Work-up:  MRI brain with and without contrast 3/19: Moderate diffuse atrophy and chronic chronic small vessel disease but appearance is not suggestive of NPH.  There was a right hemisphere area of diffusion abnormality with no ADC correlate.  This is felt to be T2 shine through, images reviewed with Dr. Dhaliwal.  Labs: LDL 76, triglycerides 165, hemoglobin A1c 5.70%, COVID-19 testing negative this admission, CRP 10.78 in August 2020 B12 level was 467, CK  25    Diagnoses:  Recent cognitive decline and falls following chemo/radiation, patient does not have classical symptoms of NPH.   CAD, NSTEMI  Rectal carcinoma status post chemo/radiation  Leukocytosis, possible UTI  CKD    Plan:  Aspirin, Plavix, statin per cardiology.  Of note patient's pravastatin was recently discontinued by Dr. Gray due to recent cognitive decline.  On Rocephin for possible UTI  No further evaluation needed for NPH but recommend outpatient neurology follow-up for further evaluation of dementia. She requests neurologist in [place], will place outpatient referral to [facility] in [place], Dr Seipel.  The patient was D/W Dr Dhaliwal today. Will sign off. Ok for d/c.   Patient d/w Dr Dhaliwal today

## 2021-03-21 ENCOUNTER — READMISSION MANAGEMENT (OUTPATIENT)
Dept: CALL CENTER | Facility: HOSPITAL | Age: 81
End: 2021-03-21

## 2021-03-21 NOTE — OUTREACH NOTE
Prep Survey      Responses   [facility] patient discharged from?  [facility]   Is LACE score < 7 ?  No   Emergency Room discharge w/ pulse ox?  No   Eligibility  Readm Mgmt   Discharge diagnosis  Inferior MI,  Rectal mass   Does the patient have one of the following disease processes/diagnoses(primary or secondary)?  Acute MI (STEMI,NSTEMI)   Does the patient have Home health ordered?  No   Is there a DME ordered?  No   Prep survey completed?  Yes          Leticia Gilliland RN

## 2021-03-22 LAB — QT INTERVAL: 369 MS

## 2021-03-23 ENCOUNTER — READMISSION MANAGEMENT (OUTPATIENT)
Dept: CALL CENTER | Facility: HOSPITAL | Age: 81
End: 2021-03-23

## 2021-03-23 LAB
BACTERIA SPEC AEROBE CULT: NORMAL
BACTERIA SPEC AEROBE CULT: NORMAL

## 2021-03-23 NOTE — OUTREACH NOTE
AMI Week 1 Survey      Responses   [facility] patient discharged from?  [place]   Does the patient have one of the following disease processes/diagnoses(primary or secondary)?  Acute MI (STEMI,NSTEMI)   Week 1 attempt successful?  Yes   Call start time  1428   Call end time  1435   Meds reviewed with patient/caregiver?  Yes   Is the patient having any side effects they believe may be caused by any medication additions or changes?  No   Does the patient have all prescriptions related to this admission filled (includes statins,anticoagulants,HTN meds,anti-arrhythmia meds)  Yes   Is the patient taking all medications as directed (includes completed medication regime)?  Yes   Does the patient have a primary care provider?   Yes   Does the patient have an appointment with their PCP,cardiologist,or clinic within 7 days of discharge?  Yes   Comments regarding PCP  3/26/21 PCP FU   Has the patient kept scheduled appointments due by today?  N/A   Psychosocial issues?  No   Did the patient receive a copy of their discharge instructions?  Yes   Nursing interventions  Reviewed instructions with patient   What is the patient's perception of their health status since discharge?  Improving   Nursing interventions  Nurse provided patient education   Is the patient/caregiver able to teach back signs and symptoms of when to call for help immediately:  Sudden chest discomfort, Sudden discomfort in arms, back, neck or jaw, Shortness of breath at any time, Sudden sweating or clammy skin, Nausea or vomiting, Dizziness or lightheadedness, Irregular or rapid heart rate   Nursing interventions  Nurse provided patient education   Nursing interventions  Provided education on importance of cardiac rehab   Is the patient/caregiver able to teach back lifestyle changes to help prevent MIs  Heart healthy diet, Reducing stress   Is the patient/caregiver able to teach back ways to prevent a second heart attack:  Follow up with MD   If the  patient is a current smoker, are they able to teach back resources for cessation?  Not a smoker   Is the patient/caregiver able to teach back the hierarchy of who to call/visit for symptoms/problems? PCP, Specialist, Home health nurse, Urgent Care, ED, 911  Yes   Week 1 call completed?  Yes   Wrap up additional comments  Pt will resume taking metoprolol,  will call PCP if any additional issues. [Pt states she is feeling ok. She states she stopped taking metoprolol because it made her nose bleed. Pt was advised to take metoprolol as ordered. Pt nose bleed day after dc. Patient was on O2 in hospital. Explained to pt nose bleed could be from O2.]          Latasha Bernard RN

## 2021-03-31 ENCOUNTER — READMISSION MANAGEMENT (OUTPATIENT)
Dept: CALL CENTER | Facility: HOSPITAL | Age: 81
End: 2021-03-31

## 2021-03-31 NOTE — OUTREACH NOTE
AMI Week 2 Survey      Responses   [facility] [place] patient discharged from?  [place]   Does the patient have one of the following disease processes/diagnoses(primary or secondary)?  Acute MI (STEMI,NSTEMI)   Week 2 attempt successful?  Yes   Call start time  1158   Call end time  1200   Discharge diagnosis  Inferior MI,  Rectal mass   Is patient permission given to speak with other caregiver?  Yes   List who call center can speak with     Person spoke with today (if not patient) and relationship     Meds reviewed with patient/caregiver?  Yes   Is the patient having any side effects they believe may be caused by any medication additions or changes?  No   Is the patient taking all medications as directed (includes completed medication regime)?  Yes   Has the patient kept scheduled appointments due by today?  Yes   What is the Home health agency?   Trying to get PCP to order HH   Psychosocial issues?  No   Comments  Pt's bruised hip from fall is the giving her the most issue. Denies chest pain. They do not monitor BP at home.    What is the patient's perception of their health status since discharge?  Improving   Is the patient/caregiver able to teach back signs and symptoms of when to call for help immediately:  Sudden chest discomfort, Sudden discomfort in arms, back, neck or jaw, Sudden sweating or clammy skin   Is the patient/caregiver able to teach back lifestyle changes to help prevent MIs  Heart healthy diet, Regular exercise as approved by provider   Is the patient/caregiver able to teach back the hierarchy of who to call/visit for symptoms/problems? PCP, Specialist, Home health nurse, Urgent Care, ED, 911  Yes   Week 2 call completed?  Yes          Dafne Fernandes RN

## 2021-04-07 ENCOUNTER — READMISSION MANAGEMENT (OUTPATIENT)
Dept: CALL CENTER | Facility: HOSPITAL | Age: 81
End: 2021-04-07

## 2021-04-07 RX ORDER — TRAMADOL HYDROCHLORIDE 50 MG/1
50 TABLET ORAL EVERY 12 HOURS PRN
COMMUNITY
Start: 2021-03-15 | End: 2022-01-21

## 2021-04-07 NOTE — OUTREACH NOTE
AMI Week 3 Survey      Responses   [facility] patient discharged from?  [place]   Does the patient have one of the following disease processes/diagnoses(primary or secondary)?  Acute MI (STEMI,NSTEMI)   Week 3 attempt successful?  Yes   Call start time  0943   Call end time  0945   Discharge diagnosis  Inferior MI,  Rectal mass   Meds reviewed with patient/caregiver?  Yes   Is the patient having any side effects they believe may be caused by any medication additions or changes?  No   Does the patient have all prescriptions related to this admission filled (includes statins,anticoagulants,HTN meds,anti-arrhythmia meds)  Yes   Is the patient taking all medications as directed (includes completed medication regime)?  Yes   Comments regarding appointments  .   Does the patient have a primary care provider?   Yes   Comments regarding PCP  3/26/21 PCP FU   Has the patient kept scheduled appointments due by today?  N/A   What is the Home health agency?   Seen by  nurse yesterday   Psychosocial issues?  No   Did the patient receive a copy of their discharge instructions?  Yes   Nursing interventions  Reviewed instructions with patient   What is the patient's perception of their health status since discharge?  Improving   Nursing interventions  Nurse provided patient education   Nursing interventions  Nurse provided patient education   Is the pateint /caregiver able to teach back the importance of cardiac rehab?  Yes   Nursing interventions  Provided education on importance of cardiac rehab   Is the patient/caregiver able to teach back lifestyle changes to help prevent MIs  Regular exercise as approved by provider, Heart healthy diet, Reducing stress, Limiting alcohol intake   Is the patient/caregiver able to teach back ways to prevent a second heart attack:  Take medications, Follow up with MD   Is the patient/caregiver able to teach back the hierarchy of who to call/visit for symptoms/problems? PCP, Specialist,  Home health nurse, Urgent Care, ED, 911  Yes   Week 3 call completed?  Yes   Wrap up additional comments  Pt. states she is doing fine. Reports taking all medications as prescribed. Denies any more nosebleeds. Pt. reports will be participating in Cardiac Rehab. No c/v at this time.          Radha Nguyen RN

## 2021-04-15 ENCOUNTER — READMISSION MANAGEMENT (OUTPATIENT)
Dept: CALL CENTER | Facility: HOSPITAL | Age: 81
End: 2021-04-15

## 2021-04-15 NOTE — OUTREACH NOTE
AMI Week 4 Survey      Responses   [facility] patient discharged from?  [place]   Does the patient have one of the following disease processes/diagnoses(primary or secondary)?  Acute MI (STEMI,NSTEMI)   Week 4 attempt successful?  Yes   Call start time  1548   Call end time  1550   Discharge diagnosis  Inferior MI,  Rectal mass   Meds reviewed with patient/caregiver?  Yes   Is the patient having any side effects they believe may be caused by any medication additions or changes?  No   Is the patient taking all medications as directed (includes completed medication regime)?  Yes   Has the patient kept scheduled appointments due by today?  N/A   Is the patient still receiving Home Health Services?  N/A   Psychosocial issues?  No   What is the patient's perception of their health status since discharge?  Improving   Nursing interventions  Nurse provided patient education   Is the patient/caregiver able to teach back signs and symptoms of when to call for help immediately:  Sudden chest discomfort, Sudden discomfort in arms, back, neck or jaw, Sudden sweating or clammy skin   Nursing interventions  Nurse provided patient education   Is the pateint /caregiver able to teach back the importance of cardiac rehab?  Yes   Nursing interventions  Provided education on importance of cardiac rehab   Is the patient/caregiver able to teach back lifestyle changes to help prevent MIs  Regular exercise as approved by provider, Heart healthy diet, Reducing stress, Limiting alcohol intake   Is the patient/caregiver able to teach back ways to prevent a second heart attack:  Take medications, Follow up with MD   If the patient is a current smoker, are they able to teach back resources for cessation?  Not a smoker   Is the patient/caregiver able to teach back the hierarchy of who to call/visit for symptoms/problems? PCP, Specialist, Home health nurse, Urgent Care, ED, 911  Yes   Week 4 call completed?  Yes          Mayco Bello,  RN

## 2021-04-16 ENCOUNTER — OFFICE VISIT (OUTPATIENT)
Dept: SURGERY | Facility: CLINIC | Age: 81
End: 2021-04-16

## 2021-04-16 VITALS
SYSTOLIC BLOOD PRESSURE: 120 MMHG | WEIGHT: 125.4 LBS | TEMPERATURE: 97.2 F | BODY MASS INDEX: 23.08 KG/M2 | HEART RATE: 93 BPM | OXYGEN SATURATION: 96 % | DIASTOLIC BLOOD PRESSURE: 70 MMHG | HEIGHT: 62 IN

## 2021-04-16 DIAGNOSIS — Z85.048 HISTORY OF ANAL CANCER: Primary | ICD-10-CM

## 2021-04-16 PROCEDURE — 99212 OFFICE O/P EST SF 10 MIN: CPT | Performed by: COLON & RECTAL SURGERY

## 2021-04-16 PROCEDURE — 46600 DIAGNOSTIC ANOSCOPY SPX: CPT | Performed by: COLON & RECTAL SURGERY

## 2021-04-16 RX ORDER — MIRTAZAPINE 15 MG/1
TABLET, FILM COATED ORAL
COMMUNITY
Start: 2021-04-06

## 2021-04-16 NOTE — PROGRESS NOTES
"Benita Garcia is a 80 y.o. female in for follow up of History of anal cancer   T2N0 diagnosed January 2020 s/p chemoradiation March 27 2020  Saw cards at [facility] Dr. mccarty after hospitalization for new A. fib  Home health - PT  constipatoin at times  Takes ducolax  Fiber - no  No rb   No rectal pain    /70 (BP Location: Left arm, Patient Position: Sitting, Cuff Size: Adult)   Pulse 93   Temp 97.2 °F (36.2 °C) (Temporal)   Ht 157.5 cm (62\")   Wt 56.9 kg (125 lb 6.4 oz)   LMP  (LMP Unknown)   SpO2 96%   Breastfeeding No   BMI 22.94 kg/m²   Body mass index is 22.94 kg/m².      PE:  Physical Exam  Constitutional:       General: She is not in acute distress.     Appearance: She is well-developed.   HENT:      Head: Normocephalic and atraumatic.   Abdominal:      General: There is no distension.      Palpations: Abdomen is soft.   Genitourinary:     Comments: Perianal exam: Perianal skin, ROSEMARY  TEGAN-decreased tone, no masses  Anoscopy performed: ROSEMARY  Musculoskeletal:         General: Normal range of motion.   Neurological:      Mental Status: She is alert.   Psychiatric:         Thought Content: Thought content normal.       Assessment:   1. History of anal cancer       ROSEMARY  Plan: Continue every 3 month surveillance anoscopy           "

## 2021-05-10 ENCOUNTER — APPOINTMENT (OUTPATIENT)
Dept: LAB | Facility: HOSPITAL | Age: 81
End: 2021-05-10

## 2021-05-18 DIAGNOSIS — E78.5 HYPERLIPIDEMIA, UNSPECIFIED HYPERLIPIDEMIA TYPE: ICD-10-CM

## 2021-05-18 DIAGNOSIS — C21.0 ANAL CANCER (HCC): ICD-10-CM

## 2021-05-18 DIAGNOSIS — M35.3 PMR (POLYMYALGIA RHEUMATICA) (HCC): Primary | ICD-10-CM

## 2021-05-24 ENCOUNTER — LAB (OUTPATIENT)
Dept: LAB | Facility: HOSPITAL | Age: 81
End: 2021-05-24

## 2021-05-24 ENCOUNTER — OFFICE VISIT (OUTPATIENT)
Dept: ONCOLOGY | Facility: CLINIC | Age: 81
End: 2021-05-24

## 2021-05-24 VITALS
BODY MASS INDEX: 24.09 KG/M2 | HEIGHT: 62 IN | TEMPERATURE: 97.7 F | SYSTOLIC BLOOD PRESSURE: 138 MMHG | WEIGHT: 130.9 LBS | HEART RATE: 78 BPM | RESPIRATION RATE: 18 BRPM | OXYGEN SATURATION: 92 % | DIASTOLIC BLOOD PRESSURE: 81 MMHG

## 2021-05-24 DIAGNOSIS — C21.0 ANAL CANCER (HCC): ICD-10-CM

## 2021-05-24 DIAGNOSIS — E78.5 HYPERLIPIDEMIA, UNSPECIFIED HYPERLIPIDEMIA TYPE: ICD-10-CM

## 2021-05-24 DIAGNOSIS — M35.3 PMR (POLYMYALGIA RHEUMATICA) (HCC): ICD-10-CM

## 2021-05-24 DIAGNOSIS — C21.0 ANAL CANCER (HCC): Primary | ICD-10-CM

## 2021-05-24 LAB
ALBUMIN SERPL-MCNC: 3.9 G/DL (ref 3.5–5.2)
ALBUMIN/GLOB SERPL: 1 G/DL (ref 1.1–2.4)
ALP SERPL-CCNC: 169 U/L (ref 38–116)
ALT SERPL W P-5'-P-CCNC: 10 U/L (ref 0–33)
ANION GAP SERPL CALCULATED.3IONS-SCNC: 10.9 MMOL/L (ref 5–15)
AST SERPL-CCNC: 16 U/L (ref 0–32)
BASOPHILS # BLD AUTO: 0.07 10*3/MM3 (ref 0–0.2)
BASOPHILS NFR BLD AUTO: 1 % (ref 0–1.5)
BILIRUB SERPL-MCNC: 0.2 MG/DL (ref 0.2–1.2)
BUN SERPL-MCNC: 35 MG/DL (ref 6–20)
BUN/CREAT SERPL: 17.8 (ref 7.3–30)
CALCIUM SPEC-SCNC: 9.1 MG/DL (ref 8.5–10.2)
CHLORIDE SERPL-SCNC: 105 MMOL/L (ref 98–107)
CHOLEST SERPL-MCNC: 295 MG/DL (ref 0–200)
CO2 SERPL-SCNC: 23.1 MMOL/L (ref 22–29)
CREAT SERPL-MCNC: 1.97 MG/DL (ref 0.6–1.1)
DEPRECATED RDW RBC AUTO: 51.1 FL (ref 37–54)
EOSINOPHIL # BLD AUTO: 0.16 10*3/MM3 (ref 0–0.4)
EOSINOPHIL NFR BLD AUTO: 2.2 % (ref 0.3–6.2)
ERYTHROCYTE [DISTWIDTH] IN BLOOD BY AUTOMATED COUNT: 14 % (ref 12.3–15.4)
GFR SERPL CREATININE-BSD FRML MDRD: 25 ML/MIN/1.73
GLOBULIN UR ELPH-MCNC: 4.1 GM/DL (ref 1.8–3.5)
GLUCOSE SERPL-MCNC: 95 MG/DL (ref 74–124)
HCT VFR BLD AUTO: 34 % (ref 34–46.6)
HDLC SERPL-MCNC: 33 MG/DL (ref 40–60)
HGB BLD-MCNC: 11 G/DL (ref 12–15.9)
IMM GRANULOCYTES # BLD AUTO: 0.03 10*3/MM3 (ref 0–0.05)
IMM GRANULOCYTES NFR BLD AUTO: 0.4 % (ref 0–0.5)
LDLC SERPL CALC-MCNC: 142 MG/DL (ref 0–100)
LDLC/HDLC SERPL: 4.14 {RATIO}
LYMPHOCYTES # BLD AUTO: 2.04 10*3/MM3 (ref 0.7–3.1)
LYMPHOCYTES NFR BLD AUTO: 28 % (ref 19.6–45.3)
MCH RBC QN AUTO: 32.5 PG (ref 26.6–33)
MCHC RBC AUTO-ENTMCNC: 32.4 G/DL (ref 31.5–35.7)
MCV RBC AUTO: 100.6 FL (ref 79–97)
MONOCYTES # BLD AUTO: 0.73 10*3/MM3 (ref 0.1–0.9)
MONOCYTES NFR BLD AUTO: 10 % (ref 5–12)
NEUTROPHILS NFR BLD AUTO: 4.25 10*3/MM3 (ref 1.7–7)
NEUTROPHILS NFR BLD AUTO: 58.4 % (ref 42.7–76)
NRBC BLD AUTO-RTO: 0 /100 WBC (ref 0–0.2)
PLATELET # BLD AUTO: 422 10*3/MM3 (ref 140–450)
PMV BLD AUTO: 8.7 FL (ref 6–12)
POTASSIUM SERPL-SCNC: 4.3 MMOL/L (ref 3.5–4.7)
PROT SERPL-MCNC: 8 G/DL (ref 6.3–8)
RBC # BLD AUTO: 3.38 10*6/MM3 (ref 3.77–5.28)
SODIUM SERPL-SCNC: 139 MMOL/L (ref 134–145)
TRIGL SERPL-MCNC: 627 MG/DL (ref 0–150)
VLDLC SERPL-MCNC: 120 MG/DL (ref 5–40)
WBC # BLD AUTO: 7.28 10*3/MM3 (ref 3.4–10.8)

## 2021-05-24 PROCEDURE — 36415 COLL VENOUS BLD VENIPUNCTURE: CPT

## 2021-05-24 PROCEDURE — 80061 LIPID PANEL: CPT | Performed by: INTERNAL MEDICINE

## 2021-05-24 PROCEDURE — 85025 COMPLETE CBC W/AUTO DIFF WBC: CPT

## 2021-05-24 PROCEDURE — 80053 COMPREHEN METABOLIC PANEL: CPT

## 2021-05-24 PROCEDURE — 99213 OFFICE O/P EST LOW 20 MIN: CPT | Performed by: INTERNAL MEDICINE

## 2021-06-24 ENCOUNTER — TELEPHONE (OUTPATIENT)
Dept: ONCOLOGY | Facility: CLINIC | Age: 81
End: 2021-06-24

## 2021-06-24 RX ORDER — PREDNISONE 10 MG/1
10 TABLET ORAL DAILY
Qty: 14 TABLET | Refills: 0 | Status: SHIPPED | OUTPATIENT
Start: 2021-06-24 | End: 2022-01-21

## 2021-06-24 NOTE — TELEPHONE ENCOUNTER
Returning call to pt's . Pt's  stated pt has no appetite and is barely eating anything. Pt's  stated it has gotten worse over the past couple of weeks.Pt's  confirmed pt is drinking well. Pt's  denies any other symptoms at this time. Pt's  wondering if Dr. Gray can order an appetite stimulant for pt. Told pt's  Dr. Gray is out of the office until Monday, that I would review with NP and give him a call back and let him know what is recommended. Pt's  v/u.       Reviewed with Lizy HANSEN. Will do 10 mg prednisone daily for 2 weeks.     Called pt's  back to review the above and he v/u.     Routed rx to Lizy.

## 2021-07-16 ENCOUNTER — OFFICE VISIT (OUTPATIENT)
Dept: SURGERY | Facility: CLINIC | Age: 81
End: 2021-07-16

## 2021-07-16 VITALS
HEART RATE: 96 BPM | OXYGEN SATURATION: 98 % | BODY MASS INDEX: 25.87 KG/M2 | WEIGHT: 131.8 LBS | HEIGHT: 60 IN | SYSTOLIC BLOOD PRESSURE: 136 MMHG | TEMPERATURE: 97.6 F | DIASTOLIC BLOOD PRESSURE: 84 MMHG

## 2021-07-16 DIAGNOSIS — Z85.048 HISTORY OF ANAL CANCER: Primary | ICD-10-CM

## 2021-07-16 PROCEDURE — 46600 DIAGNOSTIC ANOSCOPY SPX: CPT | Performed by: COLON & RECTAL SURGERY

## 2021-07-16 PROCEDURE — 99213 OFFICE O/P EST LOW 20 MIN: CPT | Performed by: COLON & RECTAL SURGERY

## 2021-07-16 NOTE — PROGRESS NOTES
"Benita Garcia is a 81 y.o. female in for follow up of History of anal cancer    T2N0 diagnosed January 2020 s/p chemoradiation March 27 2020  Energy improving.    Anticoagulated secondary to A-Fib  No RB.    /84 (BP Location: Left arm, Patient Position: Sitting, Cuff Size: Small Adult)   Pulse 96   Temp 97.6 °F (36.4 °C)   Ht 152.4 cm (60\")   Wt 59.8 kg (131 lb 12.8 oz)   LMP  (LMP Unknown)   SpO2 98%   Breastfeeding No   BMI 25.74 kg/m²   Body mass index is 25.74 kg/m².      PE:  Physical Exam  Constitutional:       General: She is not in acute distress.     Appearance: She is well-developed.   HENT:      Head: Normocephalic and atraumatic.   Abdominal:      General: There is no distension.      Palpations: Abdomen is soft.   Genitourinary:     Comments: Perianal exam: WNL  TEGAN- decreased tone, no masses  Anoscopy performed:  No evidence of dysplasia or malignancy     Musculoskeletal:         General: Normal range of motion.   Neurological:      Mental Status: She is alert.   Psychiatric:         Thought Content: Thought content normal.       Assessment:   1. History of anal cancer    s/p chemoradiation March 27 2020    Plan:ROSEMARY  - Follow up in 3 months for continued surveillance       Scribed for Barry Stack MD by Suzanna Turner PA-C. 7/16/2021  10:41 EDT   This patient was evaluated by me, recommendations made, documentation reviewed, edited, and revised by me, Barry Stack MD        "

## 2021-09-30 ENCOUNTER — TELEPHONE (OUTPATIENT)
Dept: ONCOLOGY | Facility: CLINIC | Age: 81
End: 2021-09-30

## 2021-09-30 DIAGNOSIS — F03.91 DEMENTIA WITH BEHAVIORAL DISTURBANCE, UNSPECIFIED DEMENTIA TYPE: Primary | ICD-10-CM

## 2021-09-30 NOTE — TELEPHONE ENCOUNTER
Caller: Jayleen Saleh    Relationship: GRANDDAUGHTER     Best call back number: [phone]    What is the best time to reach you: ANY    Who are you requesting to speak with (clinical staff, provider,  specific staff member): DR. ARITA    Do you know the name of the person who called: JAYLEEN    What was the call regarding: JAYLEEN CALLED VERY WEEPY STATING THAT PATIENT SEEMS TO BE DECLINING RAPIDLY & STATES PATIENTS PCP IS NOT BEING VERY HELPFUL & IS LOOKING FOR RECOMMENDATIONS FROM DR. ARITA TO SEE IF SHE HAS DEMENTIA & IF HE RECOMMENDS WHERE THEY CAN TAKE HER TO GET HELP.    Do you require a callback: YES, PLEASE

## 2021-09-30 NOTE — TELEPHONE ENCOUNTER
Pts granddaughter is requesting a referral  to a Neurologist. Pts is decline cognitively and she feels she may be showing early signs of dementia. D/W Dr. Gray. Per Dr. Gray a Referral will be placed for pt to see Dr. Ryley Rodriguez. Pts granddaughter informed and V/U. Message sent to scheduling and order placed.

## 2021-10-06 ENCOUNTER — OFFICE VISIT (OUTPATIENT)
Dept: NEUROLOGY | Facility: CLINIC | Age: 81
End: 2021-10-06

## 2021-10-06 VITALS
OXYGEN SATURATION: 98 % | HEART RATE: 74 BPM | DIASTOLIC BLOOD PRESSURE: 88 MMHG | SYSTOLIC BLOOD PRESSURE: 156 MMHG | HEIGHT: 60 IN | BODY MASS INDEX: 27.09 KG/M2 | WEIGHT: 138 LBS

## 2021-10-06 DIAGNOSIS — R41.3 MEMORY LOSS: Primary | ICD-10-CM

## 2021-10-06 PROCEDURE — 99215 OFFICE O/P EST HI 40 MIN: CPT | Performed by: PSYCHIATRY & NEUROLOGY

## 2021-10-06 RX ORDER — DONEPEZIL HYDROCHLORIDE 5 MG/1
TABLET, FILM COATED ORAL
COMMUNITY
Start: 2021-08-19 | End: 2021-10-06

## 2021-10-06 RX ORDER — DONEPEZIL HYDROCHLORIDE 5 MG/1
5 TABLET, FILM COATED ORAL DAILY
Qty: 30 TABLET | Refills: 0 | Status: SHIPPED | OUTPATIENT
Start: 2021-10-06 | End: 2022-01-21

## 2021-10-06 NOTE — PROGRESS NOTES
Subjective:     Patient ID: Benita Garcia is a 81 y.o. female.    The patient is a 81-year-old right-handed female with a history of anal rectal cancer status post chemo and radiation, hypertension, hyperlipidemia, chronic kidney disease, pulmonary hypertension, and macular degeneration who presents to the neurology clinic today as a new patient to me for the evaluation of memory loss.  The patient was seen inpatient by our neurology consult service on March 19, 2021.  They were seen for memory issues.  There is a question of NPH.  The patient had a brain MRI that showed moderate atrophy.  I personally reviewed the imaging and showed Moscoso ratio of 0.28.  They did not think that she had NPH.  I reviewed the patient's labs on August 7 of 2020 her B12 level was 467.    Accompanied by granddaughter, Jayleen, today.  Lives at home with .  Patient is not concerned.  Granddaughter and  is concerned.  Started around January of this year, but has been getting worse.  May forget that a loved one had passed.  Forgets conversations. Forgets medications.   tries to help with her medication, but she is generally in charge of them.  Not really interested in taking medications. Used to take a lot of vitamins.  No trouble with ADLs.  Still cooks.  Has not left the stove on.  But doesn't cook that much.  Describes mood as fine.  Doesn't socialize as much as she used to.  Doesn't exercise/walk as much.  No impulsivity issues.  Had a bad tremor in her left hand earlier this year.  But they haven't seen it in a couple of months.  Sleeps well.  No dream enactment.  No snoring.  Has had 2 falls in the past year.  Not currently driving.  No accidents due to memory issues.  Patient pays the bills.  Has not forgotten to pay one.  Family is a little concerned about leaving her home alone due to falls with the dog.   had a stroke about a month ago, but has recovered.  No hallucinations.  No h/o urinary  incontinence.  Has a HS diploma.  Last worked as a  years ago.  No EtOH.  No tobacco.  Used to garden.  Sometimes she reads.  Likes crossword puzzles.      Loss of consciousness/head trauma? no  Seizures/strokes/CNS infections? Had a stroke in 2007/2008 presenting with vision changes.    Dry eyes/mouth? no  Family history of neurological disease? Mom had memory problems, was diagnosed with dementia (diagnosed in her 90s)  POA: Granddaughter  Living will: yes    The following portions of the patient's history were reviewed and updated as appropriate: allergies, current medications, past family history, past medical history, past social history, past surgical history and problem list.    Review of Systems     Objective:    Neurologic Exam    Physical Exam   **The patient is wearing a mask**  Constitutional:  Vital signs reviewed.  No apparent distress.  Well groomed.  Eyes:  No injection, no icterus.    Respiratory:  Normal effort.  Clear to auscultation bilaterally.  Cardiovascular:  Regular rate and rhythm.  No murmurs.  No carotid bruits. Symmetric radial pulses.  Musculoskeletal: Normal station.  Gait steady.  Normal arm swing.  Patient able to walk on heels and toes.  Tandem gait intact.  Romberg negative.  Muscle tone and bulk normal in the bilateral upper and lower extremities.  Strength is 5/5 in the bilateral upper and lower extremities proximally and distally unless otherwise specified in the neurological exam.  Skin:  No rashes.  Warm, dry, and intact.  Psychiatric:  Good mood.  Normal affect.    Neurologic:  Mental status-  The patient scored a 12 out of 30 on her Claude today.  Cranial nerves- Pupils equally round and reactive to light with intact accomodation.  Visual fields intact.  Extraocular movements intact.  Facial sensation intact.   Hearing intact to finger-rub bilaterally.  SCM and trapezius are 5/5 bilaterally.    Motor-  See musculoskeletal above.  No tremor.  Reflexes- 2+ in the  bilateral biceps, brachioradialis, patellar and trace achilles.  Toes down-going bilaterally.  Sensation-decreased vibration in the left lower extremity.  Coordination- Intact to finger tapping and heel knee shin bilaterally.   Gait- See musculoskeletal exam above.       Assessment/Plan:    The patient is a 81-year-old right-handed female with history of anal rectal cancer status post chemo and radiation, hypertension, hyperlipidemia, chronic kidney disease, pulmonary hypertension, macular degeneration who presents to the neurology clinic today for the valuation of memory loss.    1.  Memory loss-the patient scored a 12 out of 30 on her Novice today.  Her clinical presentation seems most consistent with dementia.  I do not suspect's NPH at this time.  She likely has Alzheimer's dementia.  I would like to check her TSH and B12.  We can go ahead and start Aricept 5 mg daily.  We had extensive conversation today regarding the patient's diagnosis as well as treatment.  We also discussed safety in the home.  I recommend adequate supervision.  We discussed my concerns regarding driving.  We also discussed healthy lifestyle habits as well as information resources.  We also discussed possible participation in clinical trials.  Finally, we discussed anticipating further memory decline.    A total of 60 minutes of time was spent on this encounter today.  This includes reviewing the patient's records, face-to-face time, documentation.         Problems Addressed this Visit     None      Visit Diagnoses     Memory loss    -  Primary    Relevant Orders    Vitamin B12    TSH Rfx On Abnormal To Free T4      Diagnoses       Codes Comments    Memory loss    -  Primary ICD-10-CM: R41.3  ICD-9-CM: 780.93

## 2021-10-06 NOTE — PATIENT INSTRUCTIONS
**Eat a high fiber diet    **Exercise regularly (physically and mentally)    **Floss daily    **Consider eating yogurt regularly    **Consider drinking green tea    **Limit soda and alcohol    **Ensure safety in the home    **Check out th Alzheimer's Association (www.alz.org).    **If you are interested in participating in a clinical trial, check out the following centers:   [facility] [facility]:  [email or web address].[email or web address]      [facility]:  [email or web address]/[email or web address]/[email or web address]     [facility]:  http://[email or web address]/adrcweb/    **If you live in [facility], consider [facility]. It is a free agency that helps find placement for seniors. They do an assessment and find out the need and know which facilities have openings and would be the best fit. They help figure out the financial aspects as well.  Lorri June is the nurse navigator and her number is [phone].

## 2021-10-08 ENCOUNTER — TELEPHONE (OUTPATIENT)
Dept: ONCOLOGY | Facility: CLINIC | Age: 81
End: 2021-10-08

## 2021-10-08 ENCOUNTER — OFFICE VISIT (OUTPATIENT)
Dept: SURGERY | Facility: CLINIC | Age: 81
End: 2021-10-08

## 2021-10-08 ENCOUNTER — TELEPHONE (OUTPATIENT)
Dept: NEUROLOGY | Facility: CLINIC | Age: 81
End: 2021-10-08

## 2021-10-08 VITALS
WEIGHT: 130.1 LBS | HEIGHT: 62 IN | BODY MASS INDEX: 23.94 KG/M2 | DIASTOLIC BLOOD PRESSURE: 64 MMHG | OXYGEN SATURATION: 95 % | SYSTOLIC BLOOD PRESSURE: 118 MMHG | TEMPERATURE: 97.1 F | HEART RATE: 92 BPM

## 2021-10-08 DIAGNOSIS — Z85.048 HISTORY OF ANAL CANCER: Primary | ICD-10-CM

## 2021-10-08 PROCEDURE — 46600 DIAGNOSTIC ANOSCOPY SPX: CPT | Performed by: COLON & RECTAL SURGERY

## 2021-10-08 PROCEDURE — 99212 OFFICE O/P EST SF 10 MIN: CPT | Performed by: COLON & RECTAL SURGERY

## 2021-10-08 NOTE — TELEPHONE ENCOUNTER
Caller: SHEILA     Relationship: SPOUSE      Medication requested (name and dosage): donepezil (ARICEPT) 5 MG tablet    Pharmacy where request should be sent: [facility]     Additional details provided by patient: PT'S STATES THE PHARMACY TOLD PT'S  THAT THE ARICEPT WAS ALREADY REFILLED IN AUG BUT PT STATES THEY NEVER REVD THE RX AND IS NEEDING IT REFILL. PT IS COMPLETELY OUT.   Best call back number: [phone]        Does the patient have less than a 3 day supply:  [x] Yes  [] No    Kat Wayne Rep   10/08/21 16:20 EDT

## 2021-10-08 NOTE — PROGRESS NOTES
"Benita Garcia is a 81 y.o. female in for follow up of History of anal cancer   T2N0 finished chemoradiation March 27 2020  C/o Constipation  Not taking most meds b/c does not want to   Per  worsening memory issues      /64 (BP Location: Left arm, Patient Position: Sitting, Cuff Size: Small Adult)   Pulse 92   Temp 97.1 °F (36.2 °C)   Ht 157.5 cm (62\")   Wt 59 kg (130 lb 1.6 oz)   LMP  (LMP Unknown)   SpO2 95%   Breastfeeding No   BMI 23.80 kg/m²   Body mass index is 23.8 kg/m².      PE:  Physical Exam  Constitutional:       General: She is not in acute distress.     Appearance: She is well-developed.   HENT:      Head: Normocephalic and atraumatic.   Abdominal:      General: There is no distension.      Palpations: Abdomen is soft.   Genitourinary:     Comments: Perianal exam: skin excoriation and radiation type skin changes. ROSEMARY  TEGAN- decrease tone, no masses  Anoscopy performed:  ROSEMARY    Musculoskeletal:         General: Normal range of motion.   Neurological:      Mental Status: She is alert.   Psychiatric:         Thought Content: Thought content normal.         Assessment:   1. History of anal cancer     ROSEMARY  T2N0 diagnosed January 2020 s/p chemoradiation March 27 2020  Plan:  Continue q3 mon anoscopic surveillance  Discussed with pt that she should not stop her medication without discussing it with her pcp         "

## 2021-10-08 NOTE — TELEPHONE ENCOUNTER
Caller: Milad Garcia    Relationship: Emergency Contact    Best call back number: [phone]    What is the best time to reach you: ASAP    Who are you requesting to speak with (clinical staff, provider,  specific staff member): NURSE    Do you know the name of the person who called:     What was the call regarding: PT SAYS SHE LOST HER ARICEPT AND PREDNISONE AND NEEDS MORE OF THEM    Do you require a callback: YES

## 2021-10-08 NOTE — TELEPHONE ENCOUNTER
Called pt's  back. He said pt needs refills on Aricept and Pravastatin. Explained to  that we do not prescribe either of these medications for pt. Gave him the names of the providers that do. He v/u and will call their office for a refill.

## 2021-10-08 NOTE — TELEPHONE ENCOUNTER
Spoke with pharmacy and patient's - per pharmacy this same script was sent to another pharmacy by a different MD in August therefore per the [employer] and insurance company she should have enough of this medication.    Per her  they are not sure they picked it up.  Per pharmacist if the patient's  wants to call her she will try and help him.  He is going to have to either find the existing script or call the other pharmacy and have them release the script so his current pharmacy can run it thru    I spoke with her  and he said that he will call his pharmacist and talk to her

## 2021-10-20 ENCOUNTER — TELEPHONE (OUTPATIENT)
Dept: NEUROLOGY | Facility: CLINIC | Age: 81
End: 2021-10-20

## 2021-10-20 NOTE — TELEPHONE ENCOUNTER
SPOKE WITH PATIENT'S  AND REMINDED HIM THAT SHE NEEDS TO HAVE HER LABS DRAWN THAT DR LYONS ORDERED AT HER LAST VISIT    TOLD HIM HE CAN TAKE HER TO ANY [facility] - ORDERS IN Epic-HE VOICED UNDERSTANDING

## 2021-11-02 ENCOUNTER — TELEPHONE (OUTPATIENT)
Dept: NEUROLOGY | Facility: CLINIC | Age: 81
End: 2021-11-02

## 2021-11-02 NOTE — TELEPHONE ENCOUNTER
You ordered labs on her last visit, Dr Gray ordered labs as well amd the labs you ordered where done at the same time, the results are under labs tab, it is a scanned document.      I was not sure if you saw that or not

## 2021-11-08 ENCOUNTER — APPOINTMENT (OUTPATIENT)
Dept: LAB | Facility: HOSPITAL | Age: 81
End: 2021-11-08

## 2021-11-12 ENCOUNTER — TELEPHONE (OUTPATIENT)
Dept: ONCOLOGY | Facility: CLINIC | Age: 81
End: 2021-11-12

## 2021-11-12 NOTE — TELEPHONE ENCOUNTER
called stating that pt had an episode of bleeding while having a bowel movement this AM. He reports she has been constipated but has recently restarted a medication to help with this. Given pt's hx of anal cancer, advised  to call Dr. Stack's office to give let them know of bleeding. Also d/w Dr. Gray. Per Dr. Gray, bleeding could be hemorrhoid related. Pt should watch and if it continues to occur, she needs to reach out to MD. Informed pt's  of this. He v/u and also mentioned he has a call out to Dr. Stack's office.

## 2021-12-02 ENCOUNTER — TELEPHONE (OUTPATIENT)
Dept: ONCOLOGY | Facility: CLINIC | Age: 81
End: 2021-12-02

## 2021-12-02 ENCOUNTER — APPOINTMENT (OUTPATIENT)
Dept: LAB | Facility: HOSPITAL | Age: 81
End: 2021-12-02

## 2021-12-02 NOTE — TELEPHONE ENCOUNTER
Pts  states the pt doesn't feel well.  states she ate something bad. Pt's   would like Dr. Gray to manage pts dementia. Dr. Gray advised them to consult the pts PCP for dementia.   Message sent to scheduling to reschedule pts appts. Pts  V/U.

## 2022-01-12 ENCOUNTER — TELEPHONE (OUTPATIENT)
Dept: ONCOLOGY | Facility: CLINIC | Age: 82
End: 2022-01-12

## 2022-01-12 NOTE — TELEPHONE ENCOUNTER
Caller: Milad Garcia    Relationship to patient: Emergency Contact    Best call back number: [phone]    Chief complaint: SPOUSE CALLED FOR PATIENT AS SHE IS NOT FEELING WELL.  PATIENT HAS AN APPT ON 1/13/22 AND WOULD LIKE TO KNOW IF THIS APPT CAN BE MADE TO A HOME VIDEO VISIT VIA PATIENT'S CELL, AS THEY DO NOT HAVE MY CHART.  PATIENT WAS ALSO SCHEDULED TO HAVE LABS.     CAN PATIENT HAVE LABS DONE AND SENT TO OFFICE?     Type of visit: FU    Requested date: 1/13/22    Additional notes: PLEASE CALL SPOUSE TO ADVISE OF APPT CHANGE

## 2022-01-12 NOTE — PROGRESS NOTES
Subjective  The patient, unfortunately, presents with worsening cognitive dysfunction.  She is seen by video conference with physician at another location with COVID 19.  She and her  agree to call.                                                                                                                                                                                                         REASON FOR CONSULTATION: Anal carcinoma     REQUESTING PHYSICIAN: Rosa Christianson MD, Zen Franco MD    History of Present Illness      The patient is a 81-year-old female followed (according to record) by primary care with depression, pulmonary hypertension, macular degeneration and retinal degeneration as well as aortic atherosclerosis.  She was seen December 10, 2019 by primary care plans to continue aspirin, lisinopril, pantoprazole and pravastatin.She had been noting bright red blood per rectum since the fall, 2019 with the presumption that she had hemorrhoidal bleeding.  She also indicates that she has been chronically constipated now requiring a laxative on a regular basis.  She has been tested previously with hemoccult but requested that this was not going to be helpful with her symptoms.  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     Review of the procedure note by Dr. Zen Franco January 3, 2020 indicates that there is a large mass at the anorectal area likely in the distal rectum, cauliflower with ulceration multiple biopsies taken. She has not been evaluated by colorectal surgery thus far.  The patient requested assessment and [facility] and is seen with her  and granddaughter.  She has not had  weight loss, night sweats, fever, chills though has recognized change in bowel function as described above with bright red blood per rectum for several months.     The patient was assessed January 27 and felt to have an apparent anal carcinoma that extended into the rectum.  As result she was asked to undergo PET/CT scanning and assessment by colorectal surgery.  PET/CT demonstrated the primary tumor as an oblong tumor involving the 4 inferior SPECT SPECT the rectum measuring 5 cm x 3.6 cm with intense hypermetabolism at 33.1.  There is no evidence of disease within the abdomen pelvis with mild hypermetabolism with borderline enlarged bilateral hilar and subcarinal lymph nodes with SUV up to 6.0.  These are suspected to be inflammatory in nature.  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  The patient is now scheduled to be seen by radiation therapy on February 7, 2001.     The patient is seen back February 4, 2020 and we have again discussed concurrent chemoradiotherapy rather than surgery using concurrent 5-FU (in this case capecitabine)and mitomycin.  This is a combination of 10 mg/m²-maximum 15 mg IV of mitomycin given through a free-flowing line, capecitabine is 825 mg meter squared on radiation days through the completion of radiation therapy.  This is been discussed in detail with she and her  of approximately 45 minutes therapy for 2020.  We discussed assessing her for Xeloda toxicity, have her undergo teaching for the combination treatment, subsequently scheduling a PICC line, and to proceed to radiation therapy consultation as scheduled on February 7.  We are hopeful that we can start within the next week.   The patient went on to undergo teaching, had a PICC line placed February 11, 2020 and has been seen by radiation therapy  with treatment to begin February 17, 2020.  She is seen back with her  and we discussed the overall plan in detail.  The patient went on to complete her therapy been seen March 23, 2020 completing her Xeloda and radiation therapy by April 27..  April 29 she had assessment by Dr. Stack with examination demonstrating dramatic improvement in the right posterior tumor nearly resolved externally.  As result of the epidemic her PET/CT was delayed until June 16, 2020 demonstrating significant decrease in the uptake within the distal rectum and anus and no findings of metastatic disease in the chest abdomen pelvis.    Additionally and importantly moderate to intense mediastinal bilateral hilar adenopathy is grossly unchanged from January 2020 thought to be reactive.The patient also been seen by Dr. Stack again Martha 15, 2020 with anoscopy performed and no tumor noted.     The patient is next evaluated June 18, 2020 indicating that she still has perirectal pain treated primarily, and most effectively, with Aquaphor.  Otherwise she feels well and is quite pleased about her results.    The patient presented to the emergency room August 7, 2020 due to weakness and a fall.  We did go on to reassess her via repeat CT of imaging and pelvis and also found leukocytosis with neutrophilia as well as thrombocytosis assessing peripheral blood flow cytometry, JAK2 and FISH for BCR ABL.     Imaging showed evolution of ill-defined soft tissue thickening and stranding within the presacral soft tissues, thickening of the pelvic floor thought to be symmetric possible posttreatment related, small fat-containing right inguinal hernia and asymmetric enlargement left adnexa unchanged.  Chest x-ray revealed moderate interstitial prominence thought to be chronic.  The patient's molecular testing was all negative, flow cytometry negative, iron studies and ferritin consistent with anemia chronic disease.  It was recognized the patient had  significant weight loss since completing her chemoradiation therapy by March 2020 blood, fortunately there is no evidence of recurrent disease.  Notably her sed rate was elevated at greater than 140 though she was considerably anemic at the time this was drawn down to 8.6 hemoglobin.  She returns the office August 27, 2020 with her  Amaris and that her quality of life has suffered substantially since the completion of radiation therapy.  At that point she began to have further weakness, loss of appetite and weight loss.  A concern remains about the areas seen on her previous PET/CT that had been done in June with moderate mediastinal and bilateral hilar adenopathy that was unchanged from January with findings favored to be reactive.   This patient will need a follow-up PET/CT now and we plan to schedule this next available.  We also want to place her on pain medications and low-dose steroids to reduce her symptoms and stimulate her performance status.  A PET/CT was obtained September 1, 2020 demonstrating no cervical adenopathy, no hilar, mediastinal or axillary adenopathy, mildly enlarged precarinal node, previous monitor intense FDG uptake in the mediastinum and bilateral hilar regions have resolved, moderate emphysema, sub-6 mm pulmonary nodule right upper, right middle and left upper lobe stable from June 2020, no pulmonary consolidation, pleural effusion or pneumothorax, no suspicious FDG avid pulmonary nodules, no findings of uptake within the liver, gallbladder, pancreas, spleen or kidneys, evidence of a left kidney simple cyst, hypodense left adrenal nodule measuring 1.9 x 1 x 2 unchanged thought to be adrenal adenoma, no abdominal pelvic lymphadenopathy demonstrating uptake.  There is asymmetric thickening in the left aspect of the pelvic floor representing the symmetric herniation of the left aspect of the vaginal wall posteriorly.  There is FDG uptake in the distal anus that measures 3.7 cm grossly  unchanged from June 2020.  Finally there is no evidence of FDG avid lytic or blastic osseous lesion.  She is seen back with her  September 4, 2020 fortunately have improved dramatically with low-dose steroids.  She has gained 3 pounds, able to walk with less assistance and states her general discomfort and weakness are nearly resolved.  It was elected to continue steroid doses and have her reevaluated approximately a month later.  She is seen with her  October 1, 2020 and both indicate that though she remains modestly weak in her lower extremities she has no other pain, stiffness in joints, has regained her appetite and regained her weight.  We have discussed that this process appears to possibly be similar to PMR considering the high sed rate, musculoskeletal symptoms and rapid response to steroids.  We elected to continue low-dose prednisone and the patient is next seen November 12, 2020 clearly improved!  She has normalized her activity and diet gaining significant weight.     Patient continued follow-up and is seen back January 20, 2021 with recent assessment by Dr. Stack with perianal exam negative, no masses, anoscopy with grade 1x3 internal hemorrhoids no evidence of disease.  Dr. Stack notes, however, as does the  that the patient has worsening tremor, memory loss and fairly consistent headache that has been present for several weeks to month.  She is also fallen several times but, fortunately, without significant injury.     As result of her neurologic dysfunction MRI of the brain was obtained February 4, 2021 with no evidence of metastatic disease, moderate small vessel ischemic disease and left frontal developmental venous anomaly noted.  The patient is contacted by telephone February 11 stating that she is doing fair with mild residual headache.  We have discussed other potential possibilities including her medication list and asked her to hold her pravastatin at this point.      The  patient was admitted 3/18 with chest pain and urinary incontinence.  She also again become increasingly confused.  As she underwent further assessment and with the brain showed moderate diffuse atrophy and considerable chronic small vessel ischemic change the not suggestive of NPH.  Plans are made to see her as an outpatient for her confusion and memory loss.  The patient was seen by Dr. Dara Bautista 4/18 with a negative examination for recurrence of her malignancy.  The patient now seen back 5/24/2021.  She states she is doing well and her  feels that she is at least stable particularly improved per discomfort after recent acupuncture therapy.    We were contacted in late June about the patient's poor appetite and low-dose prednisone was initiated at 10 mg daily.  She underwent additional surveillance 7/28/2021 and again on 10/15/2021 through Dr. Bautista with negative findings on endoscopy.  Unfortunately she had further issues with dementia and was seen by neurology 10/6/2021 with suspicion that she had Alzheimer's dementia and started on Aricept.  TSH and B12 levels were normal    The patient is next seen back 1/13/2022 with Alzheimer's dementia that appears to be worsening.  She has not responded to Aricept.  Her  believes that these changes developed after her radiation chemotherapy though he is advised that the treatment for her anal cancer did not precipitate Alzheimer's.  Making this more difficult is her clear progression of symptoms.  We will ask neurology for additional treatment options?  Again the patient is seen through a video conference with physician recovering from [facility]  Past Medical History:   Diagnosis Date   • Acute renal failure (HCC) 08/2020   • Anal cancer (HCC) 01/2020    SQUAMOUS CELL CARCINOMA, FOLLOWED BY DR. JOVITA BAUTISTA   • Aortic atherosclerosis (HCC)    • Arthritis    • Balance problem 01/2021   • Boil    • CAD (coronary artery disease)    • Chronic constipation    •  Colon polyps     FOLLOWED BY DR. JOVITA BAUTISTA   • Dehydration 8/7/2020   • Depression    • Elevated liver enzymes 08/2020   • Encounter for screening mammogram for breast cancer     09/2014, 10/2014, 11/2015, 11/2016, 11/2017, 11/2018, 11/2019.   • Exudative senile macular retinal degeneration (HCC)    • Fall 08/07/2020    ADMITTED TO [facility]   • GERD (gastroesophageal reflux disease)     FOLLOWED BY DR. IMTIAZ SELLERS   • Hemorrhoids    • History of chemotherapy 2020    FOR ANAL CANCER, FOLLOWED BY DR. KASHMIR ARITA   • History of radiation therapy 2020    FOLLOWED BY DR. 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   • Osteoporosis    • Peripheral neuropathy    • PMB (postmenopausal bleeding) 03/2021   • PMR (polymyalgia rheumatica) (HCC) 10/1/2020   • Postmenopausal disorder    • Pulmonary HTN (HCC)    • Rectal bleeding 2019   • Senile purpura (HCC)    • Transfusion history     Hx of blood transfussion.   • Weakness of both legs 09/2020       Past Surgical History:   Procedure Laterality Date   • CARDIAC CATHETERIZATION N/A 3/18/2021    Procedure: LEFT HEART CATH;  Surgeon: Page Gallo MD;  Location:  [facility];  Service: Cardiology;  Laterality: N/A;   • CARDIAC CATHETERIZATION N/A 3/18/2021    Procedure: CORONARY ANGIOGRAPHY;  Surgeon: Page Gallo MD;  Location:  [facility];  Service: Cardiology;  Laterality: N/A;   • CARDIAC CATHETERIZATION N/A 3/18/2021    Procedure: Left ventriculography;  Surgeon: Page Gallo MD;  Location:  [facility];  Service: Cardiology;  Laterality: N/A;   • COLONOSCOPY W/ POLYPECTOMY N/A 01/03/2020    LARGE MASS IN ANORECTAL AREA, BX: SQUAMOUS CELL CARCINOMA, 3 TUBULAR ADENOMA POLYPS IN ASCENDING, 3 TUBULAR ADENOMA POLYPS IN TRANSVERSE, JOSE M FELIX,   IMTIAZ SELLERS AT [facility]    • ENDOSCOPY N/A 01/03/2020    DR. IMTIAZ SELLERS AT [facility]   • HYSTERECTOMY N/A 1993        Current Outpatient Medications on File Prior to Visit   Medication Sig Dispense Refill   • aspirin 81 MG EC tablet Take 81 mg by mouth Daily.     • clopidogrel (PLAVIX) 75 MG tablet Take 1 tablet by mouth Daily. 90 tablet 3   • Cranberry 250 MG tablet Take  by mouth.     • linaclotide (Linzess) 72 MCG capsule capsule Take 1 capsule by mouth Every Morning Before Breakfast. 30 capsule 2   • metoprolol tartrate (LOPRESSOR) 25 MG tablet Take 1 tablet by mouth Every 12 (Twelve) Hours. 180 tablet 3   • mirtazapine (REMERON) 15 MG tablet      • Multiple Vitamins-Minerals (MULTIVITAMIN ADULT) tablet Take  by mouth.     • Multiple Vitamins-Minerals (PRESERVISION AREDS 2 PO) Take  by mouth 2 (Two) Times a Day.     • pantoprazole (PROTONIX) 40 MG EC tablet      • pravastatin (PRAVACHOL) 40 MG tablet 40 mg 2 (Two) Times a Day.     • predniSONE (DELTASONE) 10 MG tablet Take 1 tablet by mouth Daily. 14 tablet 0   • traMADol (ULTRAM) 50 MG tablet Take 50 mg by mouth Every 12 (Twelve) Hours As Needed. for pain     • vitamin C (ASCORBIC ACID) 500 MG tablet Take 500 mg by mouth Daily.     • Vitamin D, Cholecalciferol, (CHOLECALCIFEROL) 10 MCG (400 UNIT) tablet Take 400 Units by mouth Daily. Unsure of the exact units     • vitamin E 400 UNIT capsule Take 400 Units by mouth Daily.     • donepezil (ARICEPT) 5 MG tablet Take 1 tablet by mouth Daily for 30 days. 30 tablet 0     No current facility-administered medications on file prior to visit.        ALLERGIES:    Allergies   Allergen Reactions   • Codeine Rash   • Lipitor [Atorvastatin] Rash        Social History     Socioeconomic History   • Marital status:      Spouse name: Dimitri   • Number of children: 2   • Years of education: High school   Tobacco Use   • Smoking status: Former Smoker     Packs/day: 0.50     Years: 40.00      "Pack years: 20.00     Types: Cigarettes     Start date: 10/31/2004   • Smokeless tobacco: Never Used   Vaping Use   • Vaping Use: Never used   Substance and Sexual Activity   • Alcohol use: Not Currently   • Drug use: Never   • Sexual activity: Yes     Partners: Male     Birth control/protection: Post-menopausal, Surgical     Comment: .        Family History   Problem Relation Age of Onset   • Cancer Sister         Lung Cancer   • Lung cancer Sister 61   • Brain cancer Sister    • Hypertension Brother    • Heart disease Brother    • Cancer Sister         Brain Cancer   • Lung cancer Sister 72   • Heart disease Father    • Cancer Sister    • Lung cancer Sister    • Cancer Sister    • Lung cancer Sister         Review of Systems   Constitutional: Negative for activity change, appetite change, chills, fatigue and fever.   HENT: Negative for mouth sores, nosebleeds and trouble swallowing.    Respiratory: Negative for cough and shortness of breath.    Cardiovascular: Negative for chest pain and leg swelling.   Gastrointestinal: Positive for constipation. Negative for abdominal pain, blood in stool, diarrhea, nausea, rectal pain and vomiting.   Genitourinary: Negative for difficulty urinating, pelvic pain and urgency.   Skin: Negative for rash.   Neurological: Positive for headaches. Negative for dizziness, weakness and numbness.   Hematological: Negative for adenopathy. Does not bruise/bleed easily.   Psychiatric/Behavioral: Negative for sleep disturbance.   All other systems reviewed and are negative.      Objective     Vitals:    01/13/22 1040   BP: 144/92   Pulse: 120   Resp: 18   Temp: 97.1 °F (36.2 °C)   TempSrc: Temporal   SpO2: 97%   Weight: 57.3 kg (126 lb 4.8 oz)   Height: 157.5 cm (62.01\")   PainSc: 0-No pain     Current Status 1/13/2022   ECOG score 0     Physical Exam  Constitutional:       Appearance: Normal appearance.   HENT:      Head: Normocephalic and atraumatic.      Nose: Nose normal.      " Mouth/Throat:      Mouth: Mucous membranes are moist.      Pharynx: Oropharynx is clear.   Eyes:      Extraocular Movements: Extraocular movements intact.      Conjunctiva/sclera: Conjunctivae normal.      Pupils: Pupils are equal, round, and reactive to light.   Musculoskeletal:         General: Normal range of motion.      Cervical back: Normal range of motion and neck supple.   Skin:     General: Skin is warm and dry.   Neurological:      General: No focal deficit present.      Mental Status: She is alert. Mental status is at baseline. She is disoriented.           RECENT LABS:  Hematology WBC   Date Value Ref Range Status   01/13/2022 9.41 3.40 - 10.80 10*3/mm3 Final     RBC   Date Value Ref Range Status   01/13/2022 4.28 3.77 - 5.28 10*6/mm3 Final     Hemoglobin   Date Value Ref Range Status   01/13/2022 13.2 12.0 - 15.9 g/dL Final     Hematocrit   Date Value Ref Range Status   01/13/2022 42.5 34.0 - 46.6 % Final     Platelets   Date Value Ref Range Status   01/13/2022 528 (H) 140 - 450 10*3/mm3 Final        FDG PET/CT IMAGING SKULL BASE TO MID THIGH  1/30/2020    FINDINGS: The primary neoplasm is an oblong tumor mass involving the far  inferior aspect of the rectum and the pain is that measures  approximately 5 cm in greatest cephalocaudad dimension and 3.6 cm in  greatest mediolateral dimension. It demonstrates intense hypermetabolism  with a maximum measured as she 33.1 g/mL. There is no metabolic evidence  of metastatic disease within the abdomen or pelvis. Particular, no  hypermetabolic lymphadenopathy is identified. There is physiologic  distribution of the radiopharmaceutical within the neck. Imaging of the  chest shows mild hypermetabolism within a borderline enlarged bilateral  hilar and subcarinal lymph nodes. These have maximum SUV in the range of  6.0 g/mL. This would be a very usual location for isolated metastatic  disease from an anorectal neoplasm. These lymph nodes are favored to  be  inflammatory in etiology.     IMPRESSION:  Intensely hypermetabolic neoplasm involving the far inferior  aspect of the rectum and essentially the entire anus. There is no  compelling metabolic evidence of metastatic disease within the neck,  chest, abdomen or pelvis.     This report was finalized on 1/31/2020 2:39 PM by Dr. Mayco Patel M.D.      F-18 FDG PET FROM SKULL BASE TO MID THIGH WITH PET/CT FUSION  6/16/2020  FINDINGS:  There is no cervical adenopathy demonstrating FDG uptake significantly  above that of blood pool.     The thyroid, submandibular and parotid glands demonstrate roughly  symmetric FDG uptake. There is a small hiatal hernia.     Moderate to intense bilateral hilar and mediastinal lymph nodes are  present with index nodes as below:  *  Precarinal node measuring 1.1 cm in short axis dimension with a max  SUV of 4.6, grossly unchanged since 01/30/2020.  *  Left hilar node measuring 1 cm in short axis dimension and  demonstrating a max SUV of 5.9, as before.      There is no axillary adenopathy demonstrating FDG uptake significantly  above that of blood pool.     The heart is normal in size. Extensive coronary artery calcification is  present.     There is moderate emphysema. No pulmonary consolidation, pleural  effusion or pneumothorax is present. Sub-6 mm pulmonary nodule within  the right upper lobe is stable since 01/30/2020. There are no new  suspicious FDG avid pulmonary nodules.     There are no suspicious FDG avid lesions within the liver, gallbladder,  pancreas, spleen or kidneys. Bilateral adrenal nodules measure 1.6 cm on  the left and 1.3 cm on the right, demonstrate densities less than 10  Hounsfield units, are grossly unchanged since 01/30/2020 and does not  demonstrate FDG uptake significantly above that of the liver, likely  representing adenomas.     Cystic density lesion arising off the left kidney likely represents  simple cyst, as before. There are also left-sided  parapelvic cysts. No  hydronephrosis is present. There is no abdominopelvic adenopathy  demonstrating FDG uptake significantly above that of blood pool.     There is colonic diverticulosis. The appendix is unremarkable.     Previously seen long segment of intense FDG uptake within the distal  rectum and anus has significantly decreased in FDG uptake demonstrating  a max SUV of 3.5 (previously 33.1.). The overall length of uptake has  also decreased. There is no free intraperitoneal air or fluid.     There are not suspicious lytic or blastic FDG avid osseous lesions.  Moderate anterolisthesis of L5 on S1 is present. Findings of pelvic  floor prolapse with an enterocele extending approximately 1 cm below the  pubococcygeal line. The uterus is surgically absent.     IMPRESSION:  1.  Significant decrease in extent and degree of FDG uptake within the  distal rectum and anus as above. No findings of new metastatic disease  in the chest, abdomen or pelvis.  2.  Moderate to intense mediastinal and bilateral hilar adenopathy which  are grossly unchanged since 01/30/2020 and while findings are favored be  reactive, continued attention on follow-up is recommended to ensure  stability.  3.  Findings of bilateral adrenal adenomas, as before.  4.  Other findings as above.     This report was finalized on 6/18/2020 9:34 AM by Dr. Carlos Mackey M.D.     MRI EXAMINATION OF BRAIN WITH AND WITHOUT CONTRAST     HISTORY: Anal cancer, evaluate for metastatic disease.     COMPARISON: No prior MRI is available for comparison.     TECHNIQUE: A MRI examination of the brain was performed utilizing  sagittal T1, axial diffusion, T1, T2, T2 FLAIR, susceptibility weighted  imaging as well as sagittal, axial and coronal T1 postcontrast weighted  sequences.     FINDINGS:  There is no evidence of restricted diffusion, hydrocephalus  or of abnormal extra-axial fluid. There is expected flow-void in the  basilar artery and in the distal aspect of  the internal carotid arteries  bilaterally on the axial T2 sequence. A moderate size venous  malformation or a developmental venous anomaly involving the left  frontal lobe posteriorly is appreciated. These are considered to be  developmental variants. There was no evidence of abnormal enhancement to  suggest metastatic disease.     The axial T2 FLAIR sequence demonstrates confluent increased signal  intensity involving the white matter of the cerebral hemispheres  bilaterally, nonspecific but consistent with moderate small vessel  ischemic disease.     IMPRESSION:  No evidence of metastatic disease. Moderate small vessel  ischemic disease and a left frontal developmental venous anomaly is  appreciated.             Assessment/Plan       81-year-old female followed by primary care as described above with development over the last 4 to 5 months of bright red blood per rectum that became associated with worsening constipation.  The patient symptoms, unfortunately, progressed until she had GI assessment that revealed a mass in the distal rectum/anal rectal region that was cauliflower in description with ulceration.  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  She has been advised that she has a rectal malignancy.                                              In discussing this with the patient and her family we discussed that a primary rectal squamous cell carcinomas is a rare disorder and one wonders whether this is not an anal carcinoma that has extended into the rectum.  They are, incidentally, treated like rectal adenocarcinomas with surgical resection but there are studies that use chemoradiotherapy.      This patient needs to be assessed by colorectal surgery and undergo additional staging endoscopically.  We have contacted Dr. Dara Stack's office and, as they review the record, have requested that the patient proceed with laboratory studies, PET/CT and colorectal surgery  assessment.  Additional laboratory studies include a CEA of 3.07, no evidence of iron deficiency, CMP with total protein mildly elevated at 8.3 g/dL.  This did occur and the the patient's studies suggest T2 N0 M0-stage IIA disease.      After review by colorectal surgery the patient is felt best treated by chemoradiation therapy and she is seen with her  February 4, 2020 at which time we discussed how this can be accomplished.  The patient proceeded to laboratory for DPD testing which is currently pending as she is seen February 12, 2020.  We have obtained Xeloda at a 25 mg meter squared to 1500 mg p.o. twice daily during days of radiation therapy with mitomycin also planned - 10 mg meter squared to 15 mg dosing anticipated day 1.    Treatment initiated on 2/17/2020 with concurrent chemotherapy and radiation.  Patient received IV mitomycin 15 mg, as well as initiated Xeloda 1500 Mg twice daily on days of radiation    2/24/2020 patient returns for evaluation.  Overall, tolerating treatment fairly well, other than some mild nausea.  .  Zofran has not been beneficial.  Will prescribe Compazine 10 mg every 6 hours as needed for nausea.  We will flush her PICC line today and change her dressing.  Patient will return in 1 week for reevaluation.    3/10/2020 patient returns for evaluation, starting to have significant discomfort in her anorectal region, particularly with bowel movements and sitting.  She does not have pain medication.  She is reluctant to try pain medication, but is willing to try something particularly to help her sleep at night.  We will prescribe tramadol 1 every 6 hours as needed for pain.    3/17/2020 Worsening pain. Tramadol only helping for about 2 hours.  After discussion and review with Dr. Gray, we will prescribe Norco 5/325 1 to 2 tablets every 6 hours as needed for pain.  I have warned her that this can cause issues with constipation.  Also, we will go ahead and remove her PICC line  today, as her counts have remained stable.    Patient reassessed March 23, 2020, Norco more effective though she is having urinary spasm.  We discussed completing treatment and arranging for her reassessments approximately 6 weeks from now including radiologic as well as colorectal subsequent reviews.  The patient continue Xeloda and radiation therapy completing treatment by the end of March 2020.  She treated symptomatically thereafter and assessed periodically as possible during the COVID epidemic.  Review endoscopically in late April demonstrated further improvement and performed most recently Martha 15 also had no evidence of residual tumor endoscopically.     A follow-up PET done June 16 demonstrates a significant decrease in FDG uptake within the distal rectum and anus with no evidence of metastatic disease in the chest abdomen pelvis though there remains moderate to intense mediastinal bilateral hilar adenopathy that is unchanged from January 30 and thought to be reactive.  I have reviewed all these findings with the patient requesting that she undergo a repeat CT scan of the chest abdomen pelvis in approximately 6 months.          Though we had planned the above process the patient had subsequent hospitalization having presented to the emergency room August 7, 2020 due to weakness and a fall.  We did go on to reassess her via repeat CT of imaging and pelvis and also found leukocytosis with neutrophilia as well as thrombocytosis assessing peripheral blood flow cytometry, JAK2 and FISH for BCR ABL.     Imaging showed evolution of ill-defined soft tissue thickening and stranding within the presacral soft tissues, thickening of the pelvic floor thought to be symmetric possible posttreatment related, small fat-containing right inguinal hernia and asymmetric enlargement left adnexa unchanged.  Chest x-ray revealed moderate interstitial prominence thought to be chronic.  The patient's molecular testing was all  negative, flow cytometry negative, iron studies and ferritin consistent with anemia chronic disease.      It was recognized the patient had significant weight loss since completing her chemoradiation therapy by March 2020 blood, fortunately there is no evidence of recurrent disease.  Notably her sed rate was elevated at greater than 140 though she was considerably anemic at the time this was drawn down to 8.6 hemoglobin.  She returns the office August 27, 2020 with her  and evidence that her quality of life has suffered substantially since the completion of radiation therapy.  At that point she began to have further weakness, loss of appetite and weight loss.  A concern remains about the areas seen on her previous PET/CT that had been done in June with moderate mediastinal and bilateral hilar adenopathy that was unchanged from January with findings favored to be reactive.  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  There is no focal increase at this site.  She has stable sub-6 mm pulmonary nodules present with no avidity.     She has improved significantly on low-dose steroids and we have discussed that underlying inflammatory condition may still be present suppressed by her steroid use.  Patient's follow-up testing did confirm an elevated rheumatoid factor but other studies negative while continue to have an elevated sed rate.  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  As the patient seen back November 12, 2020 she continues to thrive.  We discussed a relatively slow taper over 2 months of her prednisone.     The patient is seen back after she tapered steroids,  review January 20, 2021.  There is no evidence of recurrence of her malignancy that was not concerned about her mental status including memory dysfunction, balance issues, chronic headache and worsening intention tremor.  Her neurologic exam is consistent for a degree of ataxia.     The patient went on to have a follow-up MRI of the brain without significant abnormality and she is contacted by telephone for 11/2021.  She indicates that she is doing fairly well but still has some issues cognitively recognized in talking with her.  Your medication list has led down to the discontinuance of pravastatin occasionally associated with cognitive dysfunction.     Patient is next seen 5/24/2021 after follow-up with neurology and colorectal surgery.  She is doing about the same generally it is actually improved after recent treatment via acupuncturist.  We have discussed follow-up should follow-up with colorectal surgery no progression of disease.     Unfortunately the patient has worsened neurologically and was seen 10/6/2021 having undergone testing 12/30- MoCA testing with suspicion of Alzheimer's dementia.  Aricept is not being effective after approximately 3 months use.    Plan:  *Patient has been assured that chemoradiation therapy did not precipitate her dementia    *There may be additional treatment options for her and we requested a reassessment by neurology      *Patient's constipation also an issue- MiraLAX and Senokot to be utilized    *6-month follow-up MD.

## 2022-01-13 ENCOUNTER — LAB (OUTPATIENT)
Dept: LAB | Facility: HOSPITAL | Age: 82
End: 2022-01-13

## 2022-01-13 ENCOUNTER — TELEMEDICINE (OUTPATIENT)
Dept: ONCOLOGY | Facility: CLINIC | Age: 82
End: 2022-01-13

## 2022-01-13 VITALS
WEIGHT: 126.3 LBS | RESPIRATION RATE: 18 BRPM | BODY MASS INDEX: 23.24 KG/M2 | OXYGEN SATURATION: 97 % | SYSTOLIC BLOOD PRESSURE: 144 MMHG | HEART RATE: 120 BPM | HEIGHT: 62 IN | TEMPERATURE: 97.1 F | DIASTOLIC BLOOD PRESSURE: 92 MMHG

## 2022-01-13 DIAGNOSIS — C21.0 ANAL CANCER: ICD-10-CM

## 2022-01-13 DIAGNOSIS — R41.0 CONFUSION AND DISORIENTATION: Primary | ICD-10-CM

## 2022-01-13 LAB
ALBUMIN SERPL-MCNC: 4.1 G/DL (ref 3.5–5.2)
ALBUMIN/GLOB SERPL: 1 G/DL (ref 1.1–2.4)
ALP SERPL-CCNC: 126 U/L (ref 38–116)
ALT SERPL W P-5'-P-CCNC: 9 U/L (ref 0–33)
ANION GAP SERPL CALCULATED.3IONS-SCNC: 14.4 MMOL/L (ref 5–15)
AST SERPL-CCNC: 15 U/L (ref 0–32)
BASOPHILS # BLD AUTO: 0.09 10*3/MM3 (ref 0–0.2)
BASOPHILS NFR BLD AUTO: 1 % (ref 0–1.5)
BILIRUB SERPL-MCNC: 0.3 MG/DL (ref 0.2–1.2)
BUN SERPL-MCNC: 26 MG/DL (ref 6–20)
BUN/CREAT SERPL: 12.9 (ref 7.3–30)
CALCIUM SPEC-SCNC: 9.7 MG/DL (ref 8.5–10.2)
CEA SERPL-MCNC: 2.92 NG/ML
CHLORIDE SERPL-SCNC: 103 MMOL/L (ref 98–107)
CO2 SERPL-SCNC: 22.6 MMOL/L (ref 22–29)
CREAT SERPL-MCNC: 2.01 MG/DL (ref 0.6–1.1)
DEPRECATED RDW RBC AUTO: 46.5 FL (ref 37–54)
EOSINOPHIL # BLD AUTO: 0.06 10*3/MM3 (ref 0–0.4)
EOSINOPHIL NFR BLD AUTO: 0.6 % (ref 0.3–6.2)
ERYTHROCYTE [DISTWIDTH] IN BLOOD BY AUTOMATED COUNT: 12.6 % (ref 12.3–15.4)
GFR SERPL CREATININE-BSD FRML MDRD: 24 ML/MIN/1.73
GLOBULIN UR ELPH-MCNC: 4.2 GM/DL (ref 1.8–3.5)
GLUCOSE SERPL-MCNC: 107 MG/DL (ref 74–124)
HCT VFR BLD AUTO: 42.5 % (ref 34–46.6)
HGB BLD-MCNC: 13.2 G/DL (ref 12–15.9)
IMM GRANULOCYTES # BLD AUTO: 0.03 10*3/MM3 (ref 0–0.05)
IMM GRANULOCYTES NFR BLD AUTO: 0.3 % (ref 0–0.5)
LYMPHOCYTES # BLD AUTO: 1.92 10*3/MM3 (ref 0.7–3.1)
LYMPHOCYTES NFR BLD AUTO: 20.4 % (ref 19.6–45.3)
MCH RBC QN AUTO: 30.8 PG (ref 26.6–33)
MCHC RBC AUTO-ENTMCNC: 31.1 G/DL (ref 31.5–35.7)
MCV RBC AUTO: 99.3 FL (ref 79–97)
MONOCYTES # BLD AUTO: 0.7 10*3/MM3 (ref 0.1–0.9)
MONOCYTES NFR BLD AUTO: 7.4 % (ref 5–12)
NEUTROPHILS NFR BLD AUTO: 6.61 10*3/MM3 (ref 1.7–7)
NEUTROPHILS NFR BLD AUTO: 70.3 % (ref 42.7–76)
NRBC BLD AUTO-RTO: 0 /100 WBC (ref 0–0.2)
PLATELET # BLD AUTO: 528 10*3/MM3 (ref 140–450)
PMV BLD AUTO: 8.6 FL (ref 6–12)
POTASSIUM SERPL-SCNC: 4.3 MMOL/L (ref 3.5–4.7)
PROT SERPL-MCNC: 8.3 G/DL (ref 6.3–8)
RBC # BLD AUTO: 4.28 10*6/MM3 (ref 3.77–5.28)
SODIUM SERPL-SCNC: 140 MMOL/L (ref 134–145)
WBC NRBC COR # BLD: 9.41 10*3/MM3 (ref 3.4–10.8)

## 2022-01-13 PROCEDURE — 82378 CARCINOEMBRYONIC ANTIGEN: CPT | Performed by: INTERNAL MEDICINE

## 2022-01-13 PROCEDURE — 36415 COLL VENOUS BLD VENIPUNCTURE: CPT

## 2022-01-13 PROCEDURE — 99214 OFFICE O/P EST MOD 30 MIN: CPT | Performed by: INTERNAL MEDICINE

## 2022-01-13 PROCEDURE — 85025 COMPLETE CBC W/AUTO DIFF WBC: CPT

## 2022-01-13 PROCEDURE — 80053 COMPREHEN METABOLIC PANEL: CPT

## 2022-01-13 RX ORDER — AMOXICILLIN 250 MG
2 CAPSULE ORAL DAILY
Qty: 60 TABLET | Refills: 1 | Status: SHIPPED | OUTPATIENT
Start: 2022-01-13 | End: 2022-01-21

## 2022-01-21 ENCOUNTER — OFFICE VISIT (OUTPATIENT)
Dept: NEUROLOGY | Facility: CLINIC | Age: 82
End: 2022-01-21

## 2022-01-21 VITALS
HEART RATE: 71 BPM | BODY MASS INDEX: 23.19 KG/M2 | WEIGHT: 126 LBS | DIASTOLIC BLOOD PRESSURE: 82 MMHG | OXYGEN SATURATION: 96 % | SYSTOLIC BLOOD PRESSURE: 124 MMHG | HEIGHT: 62 IN

## 2022-01-21 DIAGNOSIS — F03.90 DEMENTIA WITHOUT BEHAVIORAL DISTURBANCE, UNSPECIFIED DEMENTIA TYPE: Primary | ICD-10-CM

## 2022-01-21 DIAGNOSIS — I67.9 CEREBROVASCULAR DISEASE: ICD-10-CM

## 2022-01-21 DIAGNOSIS — R63.0 DECREASED APPETITE: ICD-10-CM

## 2022-01-21 PROCEDURE — 99215 OFFICE O/P EST HI 40 MIN: CPT | Performed by: PHYSICIAN ASSISTANT

## 2022-01-21 RX ORDER — DONEPEZIL HYDROCHLORIDE 10 MG/1
10 TABLET, FILM COATED ORAL NIGHTLY
Qty: 30 TABLET | Refills: 2 | Status: SHIPPED | OUTPATIENT
Start: 2022-01-21 | End: 2022-02-20

## 2022-01-21 RX ORDER — MEMANTINE HYDROCHLORIDE 5 MG-10 MG
KIT ORAL
Qty: 1 EACH | Refills: 0 | Status: SHIPPED | OUTPATIENT
Start: 2022-01-21

## 2022-01-21 NOTE — PROGRESS NOTES
CC: Follow-up dementia    HPI:  Benita Garcia is a  81 y.o.  right-handed female presents today for follow-up concerning her history of dementia.  She is accompanied by her  today, BECKY who drove her and adds to her history.  Patient's other past medical history is notable for anal/rectal cancer status post chemo and radiation, hypertension, hyperlipidemia, chronic kidney disease, pulmonary hypertension, GERD and macular degeneration.  Patient was last seen by Dr. Mcgowan on 10/6/2021, a summary of her condition is taken from previous note with amendments and additions as needed    The patient was first seen by our service while she was hospitalized back in March 2021. She presented to the ED after experiencing some chest pain and was found to have an acute MI. She underwent cardiac cath - apparently some sort of intervention was attempted but ultimately unsuccessful.  She was recommended to be treated medically with aspirin, clopidogrel, and statin therapy.  At this time her  indicated that prior to going to the ED he had noticed a decline in her memory -he states he saw a dramatic change in her cognitive abilities after her last chemo/radiation treatment. Additionally about a week prior to going to the ED she had a fall outside, and landed in a ditch. Due to her reported memory loss and history of falling neurology was consulted.  Apparently the patient also had some urinary incontinence around this time and so there was there concern for normal pressure hydrocephalus.     MRI of the brain was performed on 3/19/2021 and showed moderate diffuse atrophy and considerable chronic small vessel ischemic change.  Overall appearance was not suggestive of normal pressure hydrocephalus noted that Dr. Mcgowan reviewed and found Moscoso ratio to be normal (0.28).  Additionally she has had some recent labs, B12 level and thyroid panel were both within normal limits.     Today patient is with her  and she  seems less concerned overall about her symptoms.   states that certainly there is some cause for concern.  In terms of ADLs patient is still able to get herself dressed and ready independently.  However as far as household chores including cooking and cleaning,  reports she has not been a participant in these activities for about a year.  He states she lacks motivation and energy.  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  She does not manage her own medications, he does this for her.  She no longer drives.  She does report she sleeps well in general  indicates she sleeps a lot.  Her mood definitely seems more subdued, she seems more withdrawn and seems to enjoy things less than she previously did.  There is no reports of hallucinations or delusions.  Her gait seems to have returned to normal and she does ambulate independently.  There has been no recent falls.   indicates that her appetite continues to be very poor, she has seen some gradual weight loss over the past 6 months.  Patient reports she is simply not very hungry.  3 months ago she was started on Aricept 5 mg per Dr. Mcgowan.  She reports she is tolerating medication well without any significant side effects.  Particularly denies any nausea, vomiting, diarrhea or abdominal pain.  So far she has not noticed too much improvement in terms of her memory since starting the Aricept.    Family history is remarkable for dementia in her mother.  Patient reports no history of alcohol or tobacco use.     Past Medical History:   Diagnosis Date   • Acute renal failure (HCC) 08/2020   • Anal cancer (HCC) 01/2020    SQUAMOUS CELL CARCINOMA, FOLLOWED BY DR. JOVITA BAUTISTA   • Aortic atherosclerosis (HCC)    • Arthritis    • Balance problem 01/2021   • Boil    • CAD (coronary artery disease)    • Chronic constipation    • Colon polyps     FOLLOWED BY DR. JOVITA BAUTISTA   •  Dehydration 8/7/2020   • Depression    • Elevated liver enzymes 08/2020   • Encounter for screening mammogram for breast cancer     09/2014, 10/2014, 11/2015, 11/2016, 11/2017, 11/2018, 11/2019.   • Exudative senile macular retinal degeneration (HCC)    • Fall 08/07/2020    ADMITTED TO [facility]   • GERD (gastroesophageal reflux disease)     FOLLOWED BY DR. IMTIAZ SELLERS   • Hemorrhoids    • History of chemotherapy 2020    FOR ANAL CANCER, FOLLOWED BY DR. KASHMIR ARITA   • History of radiation therapy 2020    FOLLOWED BY DR. 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   • Weakness of both legs 09/2020         Past Surgical History:   Procedure Laterality Date   • CARDIAC CATHETERIZATION N/A 3/18/2021    Procedure: LEFT HEART CATH;  Surgeon: Page Gallo MD;  Location:  [facility];  Service: Cardiology;  Laterality: N/A;   • CARDIAC CATHETERIZATION N/A 3/18/2021    Procedure: CORONARY ANGIOGRAPHY;  Surgeon: Page Gallo MD;  Location:  [facility];  Service: Cardiology;  Laterality: N/A;   • CARDIAC CATHETERIZATION N/A 3/18/2021    Procedure: Left ventriculography;  Surgeon: Page Gallo MD;  Location:  [facility];  Service: Cardiology;  Laterality: N/A;   • COLONOSCOPY W/ POLYPECTOMY N/A 01/03/2020    LARGE MASS IN ANORECTAL AREA, BX: SQUAMOUS CELL CARCINOMA, 3 TUBULAR ADENOMA POLYPS IN ASCENDING, 3 TUBULAR ADENOMA POLYPS IN TRANSVERSE, MELANOSIS COLI, DR. IMTIAZ SELLERS AT [facility]    •  ENDOSCOPY N/A 01/03/2020    DR. IMTIAZ SELLERS AT [facility] [place]   • HYSTERECTOMY N/A 1993           Current Outpatient Medications:   •  aspirin 81 MG EC tablet, Take 81 mg by mouth Daily., Disp: , Rfl:   •  clopidogrel (PLAVIX) 75 MG tablet, Take 1 tablet by mouth Daily., Disp: 90 tablet, Rfl: 3  •  Cranberry 250 MG tablet, Take  by mouth., Disp: , Rfl:   •  metoprolol tartrate (LOPRESSOR) 25 MG tablet, Take 1 tablet by mouth Every 12 (Twelve) Hours., Disp: 180 tablet, Rfl: 3  •  pantoprazole (PROTONIX) 40 MG EC tablet, , Disp: , Rfl:   •  pravastatin (PRAVACHOL) 40 MG tablet, 40 mg 2 (Two) Times a Day., Disp: , Rfl:   •  vitamin C (ASCORBIC ACID) 500 MG tablet, Take 500 mg by mouth Daily., Disp: , Rfl:   •  Vitamin D, Cholecalciferol, (CHOLECALCIFEROL) 10 MCG (400 UNIT) tablet, Take 400 Units by mouth Daily. Unsure of the exact units, Disp: , Rfl:   •  vitamin E 400 UNIT capsule, Take 400 Units by mouth Daily., Disp: , Rfl:   •  donepezil (ARICEPT) 10 MG tablet, Take 1 tablet by mouth Every Night for 30 days., Disp: 30 tablet, Rfl: 2  •  memantine (Namenda Titration Sam) 28 x 5 MG & 21 x 10 MG tablet pack, Follow package directions., Disp: 1 each, Rfl: 0  •  mirtazapine (REMERON) 15 MG tablet, , Disp: , Rfl:   •  Multiple Vitamins-Minerals (MULTIVITAMIN ADULT) tablet, Take  by mouth., Disp: , Rfl:       Family History   Problem Relation Age of Onset   • Cancer Sister         Lung Cancer   • Lung cancer Sister 61   • Brain cancer Sister    • Hypertension Brother    • Heart disease Brother    • Cancer Sister         Brain Cancer   • Lung cancer Sister 72   • Heart disease Father    • Cancer Sister    • Lung cancer Sister    • Cancer Sister    • Lung cancer Sister          Social History     Socioeconomic History   • Marital status:      Spouse name: Dimitri   • Number of children: 2   • Years of education: High school   Tobacco Use   • Smoking status: Former Smoker     Packs/day: 0.50     Years: 40.00      "Pack years: 20.00     Types: Cigarettes     Start date: 10/31/2004   • Smokeless tobacco: Never Used   Vaping Use   • Vaping Use: Never used   Substance and Sexual Activity   • Alcohol use: Not Currently   • Drug use: Never   • Sexual activity: Yes     Partners: Male     Birth control/protection: Post-menopausal, Surgical     Comment: .         Allergies   Allergen Reactions   • Codeine Rash   • Lipitor [Atorvastatin] Rash         ROS:  Review of Systems   Constitutional: Positive for appetite change and fatigue. Negative for activity change.   Eyes: Negative for pain, redness and itching.   Respiratory: Negative for cough, choking and shortness of breath.    Allergic/Immunologic: Negative for environmental allergies and food allergies.   Neurological: Negative for dizziness, tremors, seizures, syncope, facial asymmetry, speech difficulty, weakness, light-headedness, numbness and headaches.   Psychiatric/Behavioral: Positive for confusion. Negative for agitation, behavioral problems, decreased concentration, dysphoric mood, hallucinations, self-injury, sleep disturbance and suicidal ideas. The patient is not nervous/anxious and is not hyperactive.      I have reviewed the above ROS put in by the medical assistant and am in agreement.     Physical Exam:  Vitals:    01/21/22 1558   BP: 124/82   Pulse: 71   SpO2: 96%   Weight: 57.2 kg (126 lb)   Height: 157.5 cm (62.01\")     Body mass index is 23.04 kg/m².    Physical Exam  General: Slender elderly female, no acute distress  HEENT: Head is normocephalic, atraumatic.  Oropharynx is clear.  Neck: Neck is supple with no masses.  No cervical bruits  Heart: Regular rate and rhythm today  Extremities: No pedal edema.  Distal pulses are palpable and equal.      Neurological Exam:   Mental Status: Awake, alert, oriented 4/10.  Conversant without evidence of an affective disorder, thought disorder, delusions or hallucinations.  Attention span and concentration are " normal.  HCF: No aphasia, apraxia or dysarthria.  Total MMSE score today was 19/30.  Patient recalled 0/3 objects at 3 minutes.  Additionally her intersecting pentagons and clock draw was slightly impaired.  Animal naming was low, patient only named 5 animals in 1 minute.  Recent and remote memory are impaired.  Knowledge of recent events is limited  CN: I:   II: Visual fields full without left inattention   III, IV, VI: Eye movements intact without nystagmus or ptosis.  Pupils equal  round and reactive to light.   V,VII: Light touch and pinprick intact all 3 divisions of V.  Facial muscles symmetrical.   VIII: Hearing intact to finger rub   IX,X: Soft palate elevates symmetrically   XI: Sternomastoid and trapezius are strong.   XII: Tongue midline without atrophy or fasciculations  Motor: Normal tone and bulk in the upper and lower extremities   Power testing: No focal weakness in arms or legs  Reflexes: Upper extremities: +1 diffusely        Lower extremities: +1 at knees, trace at ankles        Toe signs: Downgoing bilaterally  Sensory: Light touch: Diffusely intact in arms and legs        Pinprick: Diffusely intact in arms and legs        Vibration: Intact at ankles        Position: Intact great toes  Cerebellar: Finger-to-nose: Slightly slow but otherwise intact           Rapid movement: Intact           Heel-to-shin: Intact  Gait and Station: Patient comes to stand without much difficulty.  Her casual walk is slightly broad-based but otherwise normal.  Patient is able to walk on her toes and heels.  Tandem walking is only minimally impaired.  Negative Romberg, negative drift.    Results:      Lab Results   Component Value Date    GLUCOSE 107 01/13/2022    BUN 26 (H) 01/13/2022    CREATININE 2.01 (C) 01/13/2022    EGFRIFNONA 24 (L) 01/13/2022    BCR 12.9 01/13/2022    CO2 22.6 01/13/2022    CALCIUM 9.7 01/13/2022    ALBUMIN 4.10 01/13/2022    AST 15 01/13/2022    ALT 9 01/13/2022       Lab Results   Component  Value Date    WBC 9.41 01/13/2022    HGB 13.2 01/13/2022    HCT 42.5 01/13/2022    MCV 99.3 (H) 01/13/2022     (H) 01/13/2022         .No results found for: RPR      No results found for: TSH, A2VFIWN, M7HIIDQ, THYROIDAB      Lab Results   Component Value Date    [id number] 467 08/07/2020         Lab Results   Component Value Date    FOLATE 18.20 08/09/2020         Lab Results   Component Value Date    HGBA1C 5.70 (H) 03/19/2021         Lab Results   Component Value Date    GLUCOSE 107 01/13/2022    BUN 26 (H) 01/13/2022    CREATININE 2.01 (C) 01/13/2022    EGFRIFNONA 24 (L) 01/13/2022    BCR 12.9 01/13/2022    K 4.3 01/13/2022    CO2 22.6 01/13/2022    CALCIUM 9.7 01/13/2022    ALBUMIN 4.10 01/13/2022    AST 15 01/13/2022    ALT 9 01/13/2022         Lab Results   Component Value Date    WBC 9.41 01/13/2022    HGB 13.2 01/13/2022    HCT 42.5 01/13/2022    MCV 99.3 (H) 01/13/2022     (H) 01/13/2022         Assessment:   1.  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  Decreased appetite/poor nutrition per 's report  3.  Dysphoric mood  4.  Cerebrovascular disease on MRI    Plan:  1.  Will escalate Aricept from 5 mg to 10 mg as patient appears to be tolerating it well.  Additionally we will go ahead and start her on Namenda, Rx for titration pack.  Risk and benefits of medicines discussed in great detail.  Patient instructed to contact office should any worrisome symptoms develop.    2.   indicates that appetite has been poor for some time.  It does not seem like Aricept has affected this 1 where the other.  I do note some weight loss over the past 6 months -per chart patient is down about 5 pounds.  Her BMI is still within normal limits.  It looks like last year patient was prescribed Remeron, I am not sure if this was given to help with her mood or as an appetite stimulant, either way patient and  reports she is no longer taking this.  They are  uncertain whether or not she had side effects to it or why it was discontinued.  Potentially could resume this should weight loss continue/appetite not     3. Presently it looks like patient is not on any sort of treatment for her mood.  I would like to see how she responds to the Aricept and Namenda before starting her on any sort of antidepressant.    4. Discussed major modifiable risk factors for stroke including:   - Hypertension, goal blood pressure <130/80 mmHg  - Diabetes mellitus, target A1C value of ?7%; a fasting glucose of 80 to 130 mg/dL; a postprandial glucose (90 to 120 minutes after a meal) less than 180 mg/dL  - Smoking, cessation encouraged if applicable   - Dyslipidemia, recommend statin therapy as tolerated with target LDL-C level ?70 mg/dL  - Physical inactivity, suggest moderate to vigorous intensity physical exercise most days of the week for at least 40 minutes    Plan on follow-up in about 3 months or sooner should need arise        Time 40 minutes              Dictated utilizing Dragon dictation.

## 2022-01-24 ENCOUNTER — OFFICE VISIT (OUTPATIENT)
Dept: SURGERY | Facility: CLINIC | Age: 82
End: 2022-01-24

## 2022-01-24 VITALS
DIASTOLIC BLOOD PRESSURE: 96 MMHG | BODY MASS INDEX: 22.93 KG/M2 | SYSTOLIC BLOOD PRESSURE: 160 MMHG | HEIGHT: 63 IN | HEART RATE: 74 BPM | TEMPERATURE: 97.1 F | OXYGEN SATURATION: 93 % | WEIGHT: 129.4 LBS

## 2022-01-24 DIAGNOSIS — Z85.048 HISTORY OF ANAL CANCER: Primary | ICD-10-CM

## 2022-01-24 PROCEDURE — 99212 OFFICE O/P EST SF 10 MIN: CPT | Performed by: COLON & RECTAL SURGERY

## 2022-01-24 PROCEDURE — 46600 DIAGNOSTIC ANOSCOPY SPX: CPT | Performed by: COLON & RECTAL SURGERY

## 2022-02-08 RX ORDER — DONEPEZIL HYDROCHLORIDE 5 MG/1
TABLET, FILM COATED ORAL
Qty: 90 TABLET | Refills: 3 | Status: SHIPPED | OUTPATIENT
Start: 2022-02-08

## 2022-07-11 DIAGNOSIS — C21.0 ANAL CANCER: Primary | ICD-10-CM

## 2022-07-13 ENCOUNTER — APPOINTMENT (OUTPATIENT)
Dept: LAB | Facility: HOSPITAL | Age: 82
End: 2022-07-13

## 2023-10-16 NOTE — TELEPHONE ENCOUNTER
Pt did not receive the compazine that was supposed to be called in yesterday. Informed her that there was an error with the escribing and that I would resend. She v/u.    No

## (undated) DEVICE — SKIN PREP TRAY W/CHG: Brand: MEDLINE INDUSTRIES, INC.

## (undated) DEVICE — 6F .070 AL 1 100CM: Brand: CORDIS

## (undated) DEVICE — PK CATH CARD 40

## (undated) DEVICE — RUNTHROUGH NS EXTRA FLOPPY PTCA GUIDEWIRE: Brand: RUNTHROUGH

## (undated) DEVICE — TR BAND RADIAL ARTERY COMPRESSION DEVICE: Brand: TR BAND

## (undated) DEVICE — CATH VENT MIV RADL PIG ST TIP 5F 110CM

## (undated) DEVICE — DEV INDEFLATOR P/N 580289

## (undated) DEVICE — RADIFOCUS OPTITORQUE ANGIOGRAPHIC CATHETER: Brand: OPTITORQUE

## (undated) DEVICE — Device: Brand: FIELDER XT

## (undated) DEVICE — KT MANIFLD CARDIAC

## (undated) DEVICE — 6F .070 AR 2 100CM: Brand: VISTA BRITE TIP

## (undated) DEVICE — HI-TORQUE WHISPER ES GUIDE WIRE .014 STRAIGHTTIP 3.0 CM X 190 CM: Brand: HI-TORQUE WHISPER

## (undated) DEVICE — GW EMR FIX EXCHG J STD .035 3MM 260CM

## (undated) DEVICE — RADIFOCUS GLIDEWIRE: Brand: GLIDEWIRE

## (undated) DEVICE — GLIDESHEATH SLENDER STAINLESS STEEL KIT: Brand: GLIDESHEATH SLENDER